# Patient Record
Sex: MALE | Race: WHITE | NOT HISPANIC OR LATINO | Employment: OTHER | URBAN - METROPOLITAN AREA
[De-identification: names, ages, dates, MRNs, and addresses within clinical notes are randomized per-mention and may not be internally consistent; named-entity substitution may affect disease eponyms.]

---

## 2017-09-13 ENCOUNTER — TRANSFERRED RECORDS (OUTPATIENT)
Dept: HEALTH INFORMATION MANAGEMENT | Facility: CLINIC | Age: 77
End: 2017-09-13

## 2017-10-26 ENCOUNTER — ALLIED HEALTH/NURSE VISIT (OUTPATIENT)
Dept: FAMILY MEDICINE | Facility: CLINIC | Age: 77
End: 2017-10-26
Payer: COMMERCIAL

## 2017-10-26 DIAGNOSIS — Z23 NEED FOR PROPHYLACTIC VACCINATION AND INOCULATION AGAINST INFLUENZA: Primary | ICD-10-CM

## 2017-10-26 PROCEDURE — 99207 ZZC NO CHARGE NURSE ONLY: CPT

## 2017-10-26 PROCEDURE — 90662 IIV NO PRSV INCREASED AG IM: CPT

## 2017-10-26 PROCEDURE — 90471 IMMUNIZATION ADMIN: CPT

## 2017-10-26 NOTE — PROGRESS NOTES

## 2017-10-26 NOTE — MR AVS SNAPSHOT
"              After Visit Summary   10/26/2017    Cecilio Navarro    MRN: [id number]           Patient Information     Date Of Birth          1940        Visit Information        Provider Department      10/26/2017 1:15 PM [place] PI CMA/LPN [facility]        Today's Diagnoses     Need for prophylactic vaccination and inoculation against influenza    -  1       Follow-ups after your visit        Your next 10 appointments already scheduled     Oct 26, 2017  1:15 PM CDT   Nurse Only with [place] PI CMA/LPN   [facility] ([facility]    [address] [facility] [id number] [phone]              Who to contact     If you have questions or need follow up information about today's clinic visit or your schedule please contact [facility] directly at [phone].  Normal or non-critical lab and imaging results will be communicated to you by MyChart, letter or phone within 4 business days after the clinic has received the results. If you do not hear from us within 7 days, please contact the clinic through MyChart or phone. If you have a critical or abnormal lab result, we will notify you by phone as soon as possible.  Submit refill requests through [employer] or call your pharmacy and they will forward the refill request to us. Please allow 3 business days for your refill to be completed.          Additional Information About Your Visit        Aginov[employer] Information     [employer] lets you send messages to your doctor, view your test results, renew your prescriptions, schedule appointments and more. To sign up, go to [email or web address] . Click on \"Log in\" on the left side of the screen, which will take you to the Welcome page. Then click on \"Sign up Now\" on the right side of the page.     You will be asked to enter the access code listed below, as well as some personal information. Please follow the directions to create your username and password.   "   Your access code is: [email or web address]  Expires: 2018  1:11 PM     Your access code will  in 90 days. If you need help or a new code, please call your [place] clinic or [phone].        Care EveryWhere ID     This is your Care EveryWhere ID. This could be used by other organizations to access your [place] medical records  [id number]         Blood Pressure from Last 3 Encounters:   12/15/16 130/86   16 139/79   10/05/16 120/72    Weight from Last 3 Encounters:   12/15/16 220 lb (99.8 kg)   16 222 lb (100.7 kg)   10/05/16 222 lb (100.7 kg)              We Performed the Following     FLU VACCINE, INCREASED ANTIGEN, PRESV FREE, AGE 65+ [27118]     Vaccine Administration, Initial [67578]        Primary Care Provider Office Phone # Fax #    Damion Morales [phone] [address] [place] [phone]        Equal Access to Services     [facility]: Hadii aad ku hadasho Soomaali, waaxda luqadaha, qaybta kaalmada adeegyada, waxay bruce dillard . So [facility] [phone].    ATENCIÓN: Si habla español, tiene a wynn disposición servicios gratuitos de asistencia lingüística. Llame al [phone].    We comply with applicable federal civil rights laws and [place] laws. We do not discriminate on the basis of race, color, national origin, age, disability, sex, sexual orientation, or gender identity.            Thank you!     Thank you for choosing [facility]  for your care. Our goal is always to provide you with excellent care. Hearing back from our patients is one way we can continue to improve our services. Please take a few minutes to complete the written survey that you may receive in the mail after your visit with us. Thank you!             Your Updated Medication List - Protect others around you: Learn how to safely use, store and throw away your medicines at www.disposemymeds.org.          This list is accurate as of: 10/26/17   1:11 PM.  Always use your most recent med list.                   Brand Name Dispense Instructions for use Diagnosis    ADVIL 200 MG capsule   Generic drug:  ibuprofen      Take 400 mg by mouth 2 times daily as needed.        aspirin 81 MG tablet      Take 81 mg by mouth daily        atorvastatin 40 MG tablet    LIPITOR    90 tablet    Take 1 tablet (40 mg) by mouth daily    Hyperlipidemia LDL goal <100       glipiZIDE 10 MG 24 hr tablet    glipiZIDE XL    90 tablet    Take 1 tablet (10 mg) by mouth daily        lisinopril-hydrochlorothiazide 20-12.5 MG per tablet    PRINZIDE/ZESTORETIC    180 tablet    One tablet twice daily    HTN, goal below 140/90       metFORMIN 1000 MG tablet    GLUCOPHAGE    180 tablet    Take 1 tablet (1,000 mg) by mouth 2 times daily (with meals)        sildenafil 100 MG tablet    VIAGRA    3 tablet    Take 1 tablet (100 mg) by mouth daily as needed for erectile dysfunction    Impotence of organic origin       vardenafil 20 MG tablet    LEVITRA    5 tablet    Take 1 tablet (20 mg) by mouth daily as needed for erectile dysfunction    Impotence of organic origin

## 2017-12-06 DIAGNOSIS — E11.21 TYPE 2 DIABETES MELLITUS WITH DIABETIC NEPHROPATHY, WITHOUT LONG-TERM CURRENT USE OF INSULIN (H): Primary | ICD-10-CM

## 2017-12-06 DIAGNOSIS — I10 HTN, GOAL BELOW 140/90: ICD-10-CM

## 2017-12-06 RX ORDER — GLIPIZIDE 10 MG/1
TABLET, FILM COATED, EXTENDED RELEASE ORAL
Qty: 90 TABLET | Refills: 0 | Status: SHIPPED | OUTPATIENT
Start: 2017-12-06 | End: 2017-12-18

## 2017-12-06 RX ORDER — LISINOPRIL AND HYDROCHLOROTHIAZIDE 12.5; 2 MG/1; MG/1
TABLET ORAL
Qty: 180 TABLET | Refills: 0 | Status: SHIPPED | OUTPATIENT
Start: 2017-12-06 | End: 2017-12-18

## 2017-12-18 ENCOUNTER — OFFICE VISIT (OUTPATIENT)
Dept: FAMILY MEDICINE | Facility: CLINIC | Age: 77
End: 2017-12-18
Payer: COMMERCIAL

## 2017-12-18 VITALS
TEMPERATURE: 96.9 F | WEIGHT: 224 LBS | HEIGHT: 67 IN | RESPIRATION RATE: 16 BRPM | HEART RATE: 68 BPM | DIASTOLIC BLOOD PRESSURE: 78 MMHG | SYSTOLIC BLOOD PRESSURE: 130 MMHG | BODY MASS INDEX: 35.16 KG/M2

## 2017-12-18 DIAGNOSIS — E11.21 TYPE 2 DIABETES MELLITUS WITH DIABETIC NEPHROPATHY, WITHOUT LONG-TERM CURRENT USE OF INSULIN (H): Primary | ICD-10-CM

## 2017-12-18 DIAGNOSIS — E66.01 MORBID OBESITY (H): ICD-10-CM

## 2017-12-18 DIAGNOSIS — E78.5 HYPERLIPIDEMIA LDL GOAL <100: ICD-10-CM

## 2017-12-18 DIAGNOSIS — E34.9 TESTOSTERONE DEFICIENCY: ICD-10-CM

## 2017-12-18 DIAGNOSIS — I10 HTN, GOAL BELOW 140/90: ICD-10-CM

## 2017-12-18 LAB
ANION GAP SERPL CALCULATED.3IONS-SCNC: 6 MMOL/L (ref 3–14)
BUN SERPL-MCNC: 16 MG/DL (ref 7–30)
CALCIUM SERPL-MCNC: 9.4 MG/DL (ref 8.5–10.1)
CHLORIDE SERPL-SCNC: 99 MMOL/L (ref 94–109)
CO2 SERPL-SCNC: 31 MMOL/L (ref 20–32)
CREAT SERPL-MCNC: 0.97 MG/DL (ref 0.66–1.25)
CREAT UR-MCNC: 190 MG/DL
GFR SERPL CREATININE-BSD FRML MDRD: 75 ML/MIN/1.7M2
GLUCOSE SERPL-MCNC: 187 MG/DL (ref 70–99)
HBA1C MFR BLD: 9 % (ref 4.3–6)
MICROALBUMIN UR-MCNC: 25 MG/L
MICROALBUMIN/CREAT UR: 13.32 MG/G CR (ref 0–17)
POTASSIUM SERPL-SCNC: 4.4 MMOL/L (ref 3.4–5.3)
SODIUM SERPL-SCNC: 136 MMOL/L (ref 133–144)

## 2017-12-18 PROCEDURE — 99214 OFFICE O/P EST MOD 30 MIN: CPT | Performed by: FAMILY MEDICINE

## 2017-12-18 PROCEDURE — 83036 HEMOGLOBIN GLYCOSYLATED A1C: CPT | Performed by: FAMILY MEDICINE

## 2017-12-18 PROCEDURE — 82043 UR ALBUMIN QUANTITATIVE: CPT | Performed by: FAMILY MEDICINE

## 2017-12-18 PROCEDURE — 80048 BASIC METABOLIC PNL TOTAL CA: CPT | Performed by: FAMILY MEDICINE

## 2017-12-18 PROCEDURE — 36415 COLL VENOUS BLD VENIPUNCTURE: CPT | Performed by: FAMILY MEDICINE

## 2017-12-18 PROCEDURE — 84403 ASSAY OF TOTAL TESTOSTERONE: CPT | Performed by: FAMILY MEDICINE

## 2017-12-18 RX ORDER — LISINOPRIL AND HYDROCHLOROTHIAZIDE 12.5; 2 MG/1; MG/1
1 TABLET ORAL 2 TIMES DAILY
Qty: 180 TABLET | Refills: 3 | Status: SHIPPED | OUTPATIENT
Start: 2017-12-18 | End: 2018-12-20

## 2017-12-18 RX ORDER — ATORVASTATIN CALCIUM 40 MG/1
40 TABLET, FILM COATED ORAL DAILY
Qty: 90 TABLET | Refills: 3 | Status: SHIPPED | OUTPATIENT
Start: 2017-12-18 | End: 2018-12-20

## 2017-12-18 RX ORDER — GLIPIZIDE 10 MG/1
10 TABLET, FILM COATED, EXTENDED RELEASE ORAL DAILY
Qty: 90 TABLET | Refills: 3 | Status: SHIPPED | OUTPATIENT
Start: 2017-12-18 | End: 2018-12-20

## 2017-12-18 NOTE — LETTER
December 22, 2017      Cecilio Navarro  [address] [place] [id number]        Dear ,    We are writing to inform you of your test results.    Here are those labs I told you about.      We'll see you back in the spring and recheck that A1C, see how you're doing on the  Diabetes.    Resulted Orders   Testosterone, total   Result Value Ref Range    Testosterone Total 228 (L) 240 - 950 ng/dL      Comment:      This test was developed and its performance characteristics determined by the   [facility],  Special Chemistry Laboratory. It has   not been cleared or approved by the FDA. The laboratory is regulated under   CLIA as qualified to perform high-complexity testing. This test is used for   clinical purposes. It should not be regarded as investigational or for   research.     Hemoglobin A1c   Result Value Ref Range    Hemoglobin A1C 9.0 (H) 4.3 - 6.0 %   Basic metabolic panel  (Ca, Cl, CO2, Creat, Gluc, K, Na, BUN)   Result Value Ref Range    Sodium 136 133 - 144 mmol/L    Potassium 4.4 3.4 - 5.3 mmol/L    Chloride 99 94 - 109 mmol/L    Carbon Dioxide 31 20 - 32 mmol/L    Anion Gap 6 3 - 14 mmol/L    Glucose 187 (H) 70 - 99 mg/dL      Comment:      Fasting specimen    Urea Nitrogen 16 7 - 30 mg/dL    Creatinine 0.97 0.66 - 1.25 mg/dL    GFR Estimate 75 >60 mL/min/1.7m2      Comment:      Non  GFR Calc    GFR Estimate If Black >90 >60 mL/min/1.7m2      Comment:       GFR Calc    Calcium 9.4 8.5 - 10.1 mg/dL   Albumin Random Urine Quantitative with Creat Ratio   Result Value Ref Range    Creatinine Urine 190 mg/dL    Albumin Urine mg/L 25 mg/L    Albumin Urine mg/g Cr 13.32 0 - 17 mg/g Cr       If you have any questions or concerns, please call the clinic at the number listed above.       Sincerely,        Damion Morales MD/vernon

## 2017-12-18 NOTE — MR AVS SNAPSHOT
"              After Visit Summary   12/18/2017    Cecilio Navarro    MRN: [id number]           Patient Information     Date Of Birth          1940        Visit Information        Provider Department      12/18/2017 9:00 AM Damion Morales MD [facility]        Today's Diagnoses     Type 2 diabetes mellitus with diabetic nephropathy, without long-term current use of insulin (H)    -  1    HTN, goal below 140/90        Hyperlipidemia LDL goal <100        Morbid obesity (H)        Testosterone deficiency           Follow-ups after your visit        Who to contact     If you have questions or need follow up information about today's clinic visit or your schedule please contact [facility] directly at [phone].  Normal or non-critical lab and imaging results will be communicated to you by MyChart, letter or phone within 4 business days after the clinic has received the results. If you do not hear from us within 7 days, please contact the clinic through [employer] or phone. If you have a critical or abnormal lab result, we will notify you by phone as soon as possible.  Submit refill requests through [employer] or call your pharmacy and they will forward the refill request to us. Please allow 3 business days for your refill to be completed.          Additional Information About Your Visit        MyChart Information     [employer] lets you send messages to your doctor, view your test results, renew your prescriptions, schedule appointments and more. To sign up, go to [email or web address] . Click on \"Log in\" on the left side of the screen, which will take you to the Welcome page. Then click on \"Sign up Now\" on the right side of the page.     You will be asked to enter the access code listed below, as well as some personal information. Please follow the directions to create your username and password.     Your access code is: [email or web address]  Expires: 1/24/2018 12:11 PM     Your access code will " " in 90 days. If you need help or a new code, please call your [facility] clinic or [phone].        Care EveryWhere ID     This is your Care EveryWhere ID. This could be used by other organizations to access your [facility] medical records  [id number]        Your Vitals Were     Pulse Temperature Respirations Height BMI (Body Mass Index)       68 96.9  F (36.1  C) (Tympanic) 16 5' 6.75\" (1.695 m) 35.35 kg/m2        Blood Pressure from Last 3 Encounters:   17 130/78   12/15/16 130/86   16 139/79    Weight from Last 3 Encounters:   17 224 lb (101.6 kg)   12/15/16 220 lb (99.8 kg)   16 222 lb (100.7 kg)              We Performed the Following     Albumin Random Urine Quantitative with Creat Ratio     Basic metabolic panel  (Ca, Cl, CO2, Creat, Gluc, K, Na, BUN)     Hemoglobin A1c     Testosterone, total          Today's Medication Changes          These changes are accurate as of: 17 10:05 AM.  If you have any questions, ask your nurse or doctor.               These medicines have changed or have updated prescriptions.        Dose/Directions    glipiZIDE 10 MG 24 hr tablet   Commonly known as:  GLUCOTROL XL   This may have changed:  See the new instructions.   Used for:  Type 2 diabetes mellitus with diabetic nephropathy, without long-term current use of insulin (H)   Changed by:  Damion Morales MD        Dose:  10 mg   Take 1 tablet (10 mg) by mouth daily   Quantity:  90 tablet   Refills:  3       lisinopril-hydrochlorothiazide 20-12.5 MG per tablet   Commonly known as:  PRINZIDE/ZESTORETIC   This may have changed:  See the new instructions.   Used for:  HTN, goal below 140/90   Changed by:  Damion Morales MD        Dose:  1 tablet   Take 1 tablet by mouth 2 times daily   Quantity:  180 tablet   Refills:  3            Where to get your medicines      [facility] [place], [place] - [address]   [address] , [facility] [address]     " Phone:  [phone]     atorvastatin 40 MG tablet    glipiZIDE 10 MG 24 hr tablet    lisinopril-hydrochlorothiazide 20-12.5 MG per tablet    metFORMIN 1000 MG tablet                Primary Care Provider Office Phone # Fax #    Damion Morales [phone] [address] [facility] [address]        Equal Access to Services     [facility]: Hadii aad ku hadasho Soomaali, waaxda luqadaha, qaybta kaalmada adeegyada, waxay idiin hayaan adeeg kharash la'aan ah. So [facility] [phone].    ATENCIÓN: Si habla español, tiene a wynn disposición servicios gratuitos de asistencia lingüística. Rebekah al [phone].    We comply with applicable federal civil rights laws and [place] laws. We do not discriminate on the basis of race, color, national origin, age, disability, sex, sexual orientation, or gender identity.            Thank you!     Thank you for choosing [facility]  for your care. Our goal is always to provide you with excellent care. Hearing back from our patients is one way we can continue to improve our services. Please take a few minutes to complete the written survey that you may receive in the mail after your visit with us. Thank you!             Your Updated Medication List - Protect others around you: Learn how to safely use, store and throw away your medicines at www.disposemymeds.org.          This list is accurate as of: 12/18/17 10:05 AM.  Always use your most recent med list.                   Brand Name Dispense Instructions for use Diagnosis    ADVIL 200 MG capsule   Generic drug:  ibuprofen      Take 400 mg by mouth 2 times daily as needed.        aspirin 81 MG tablet      Take 81 mg by mouth daily        atorvastatin 40 MG tablet    LIPITOR    90 tablet    Take 1 tablet (40 mg) by mouth daily    Hyperlipidemia LDL goal <100       glipiZIDE 10 MG 24 hr tablet    GLUCOTROL XL    90 tablet    Take 1 tablet (10 mg) by mouth daily    Type 2 diabetes mellitus with  diabetic nephropathy, without long-term current use of insulin (H)       lisinopril-hydrochlorothiazide 20-12.5 MG per tablet    PRINZIDE/ZESTORETIC    180 tablet    Take 1 tablet by mouth 2 times daily    HTN, goal below 140/90       metFORMIN 1000 MG tablet    GLUCOPHAGE    180 tablet    Take 1 tablet (1,000 mg) by mouth 2 times daily (with meals)    Type 2 diabetes mellitus with diabetic nephropathy, without long-term current use of insulin (H)       sildenafil 100 MG tablet    VIAGRA    3 tablet    Take 1 tablet (100 mg) by mouth daily as needed for erectile dysfunction    Impotence of organic origin       vardenafil 20 MG tablet    LEVITRA    5 tablet    Take 1 tablet (20 mg) by mouth daily as needed for erectile dysfunction    Impotence of organic origin

## 2017-12-18 NOTE — PROGRESS NOTES
"  SUBJECTIVE:   Cecilio Navarro is a 77 year old male who presents to clinic today for the following health issues:       Diabetes Follow-up      Patient is checking blood sugars: not at all    Diabetic concerns: None     Symptoms of hypoglycemia (low blood sugar): none     Paresthesias (numbness or burning in feet) or sores: No     Date of last diabetic eye exam: Up to date    BP Readings from Last 2 Encounters:   12/18/17 130/78   12/15/16 130/86     Hemoglobin A1C (%)   Date Value   12/15/2016 6.3 (H)   01/20/2016 6.8 (H)     LDL Cholesterol Calculated (mg/dL)   Date Value   01/20/2016 39   12/16/2013 82     LDL Cholesterol Direct (mg/dL)   Date Value   12/17/2014 81     Hypertension Follow-up      Outpatient blood pressures are not being checked.    Low Salt Diet: no added salt    Lab Request - would like PSA, testosterone levels checked.        Amount of exercise or physical activity: None    Problems taking medications regularly: No    Medication side effects: none    Diet: regular (no restrictions)        S: Cecilio Navarro is a 77 year old male with DM2 ,med/diet controlle,d some mild proteinuria in past.  Recheck today, fills on meds.   Htn: stable on meds, fills and labs  Hyperlipidemia: stable, statin. Refills  Testosterone deficiency: no libido.  Has been on replacement in past, didn't think it did much.  Maybe wants to retry, depends on where his labs are, wants to recheck  Morbid obesity: DM2 as comorbidity    Problem list, med list, additional histories reviewed and updated, as indicated.      No cp or sob    O;/78  Pulse 68  Temp 96.9  F (36.1  C) (Tympanic)  Resp 16  Ht 5' 6.75\" (1.695 m)  Wt 224 lb (101.6 kg)  BMI 35.35 kg/m2  GEN: Alert and oriented, in no acute distress  CV: RRR, no murmur  RESP: lungs clear bilaterally, good effort  EXT: no edema or lesions noted in lower extremities    A; DM2 with nephropathy, recheck       Htn, stable       Hyperlipidemia, stable       " Testosterone  Deficiency, recheck      Morbid obesity, ongoing     P: labs, fills.  Will see what he things about testosterone level and replacement again.     Weight management plan: diet/exercise       .

## 2017-12-18 NOTE — NURSING NOTE
"Chief Complaint   Patient presents with     Diabetes     Hypertension       Initial /78  Pulse 68  Temp 96.9  F (36.1  C) (Tympanic)  Resp 16  Ht 5' 6.75\" (1.695 m)  Wt 224 lb (101.6 kg)  BMI 35.35 kg/m2 Estimated body mass index is 35.35 kg/(m^2) as calculated from the following:    Height as of this encounter: 5' 6.75\" (1.695 m).    Weight as of this encounter: 224 lb (101.6 kg).  Medication Reconciliation: complete    Health Maintenance that is potentially due pending provider review:  Lab work     Possibly completing today per provider review.    Is there anyone who you would like to be able to receive your results? No  If yes have patient fill out LEVAR    "

## 2017-12-20 LAB — TESTOST SERPL-MCNC: 228 NG/DL (ref 240–950)

## 2018-04-23 ENCOUNTER — TELEPHONE (OUTPATIENT)
Dept: FAMILY MEDICINE | Facility: CLINIC | Age: 78
End: 2018-04-23

## 2018-04-23 DIAGNOSIS — E11.21 TYPE 2 DIABETES MELLITUS WITH DIABETIC NEPHROPATHY, WITHOUT LONG-TERM CURRENT USE OF INSULIN (H): Primary | ICD-10-CM

## 2018-04-23 DIAGNOSIS — E78.5 HYPERLIPIDEMIA LDL GOAL <100: ICD-10-CM

## 2018-04-23 DIAGNOSIS — I10 HTN, GOAL BELOW 140/90: ICD-10-CM

## 2018-04-23 NOTE — TELEPHONE ENCOUNTER
Reason for Call:  Other Orders    Detailed comments: Pt has appt Weds and would like to have labs done tomorrow. They will need to be ordered and he would like to make an appt to have them done in [place]. Please call pt when labs have been ordered.    Phone Number Patient can be reached at: Home number on file [phone] (home)    Best Time: any    Can we leave a detailed message on this number? YES    Call taken on 4/23/2018 at 10:17 AM by Katie De Jesus

## 2018-04-24 DIAGNOSIS — E11.21 TYPE 2 DIABETES MELLITUS WITH DIABETIC NEPHROPATHY, WITHOUT LONG-TERM CURRENT USE OF INSULIN (H): ICD-10-CM

## 2018-04-24 DIAGNOSIS — I10 HTN, GOAL BELOW 140/90: ICD-10-CM

## 2018-04-24 DIAGNOSIS — E78.5 HYPERLIPIDEMIA LDL GOAL <100: ICD-10-CM

## 2018-04-24 LAB
ANION GAP SERPL CALCULATED.3IONS-SCNC: 5 MMOL/L (ref 3–14)
BUN SERPL-MCNC: 14 MG/DL (ref 7–30)
CALCIUM SERPL-MCNC: 8.9 MG/DL (ref 8.5–10.1)
CHLORIDE SERPL-SCNC: 101 MMOL/L (ref 94–109)
CHOLEST SERPL-MCNC: 116 MG/DL
CO2 SERPL-SCNC: 30 MMOL/L (ref 20–32)
CREAT SERPL-MCNC: 0.94 MG/DL (ref 0.66–1.25)
GFR SERPL CREATININE-BSD FRML MDRD: 78 ML/MIN/1.7M2
GLUCOSE SERPL-MCNC: 166 MG/DL (ref 70–99)
HBA1C MFR BLD: 8.3 % (ref 0–5.6)
HDLC SERPL-MCNC: 39 MG/DL
LDLC SERPL CALC-MCNC: 50 MG/DL
NONHDLC SERPL-MCNC: 77 MG/DL
POTASSIUM SERPL-SCNC: 3.5 MMOL/L (ref 3.4–5.3)
SODIUM SERPL-SCNC: 136 MMOL/L (ref 133–144)
TRIGL SERPL-MCNC: 133 MG/DL
TSH SERPL DL<=0.005 MIU/L-ACNC: 1.62 MU/L (ref 0.4–4)

## 2018-04-24 PROCEDURE — 36415 COLL VENOUS BLD VENIPUNCTURE: CPT | Performed by: FAMILY MEDICINE

## 2018-04-24 PROCEDURE — 84443 ASSAY THYROID STIM HORMONE: CPT | Performed by: FAMILY MEDICINE

## 2018-04-24 PROCEDURE — 80048 BASIC METABOLIC PNL TOTAL CA: CPT | Performed by: FAMILY MEDICINE

## 2018-04-24 PROCEDURE — 80061 LIPID PANEL: CPT | Performed by: FAMILY MEDICINE

## 2018-04-24 PROCEDURE — 83036 HEMOGLOBIN GLYCOSYLATED A1C: CPT | Performed by: FAMILY MEDICINE

## 2018-04-25 ENCOUNTER — OFFICE VISIT (OUTPATIENT)
Dept: FAMILY MEDICINE | Facility: CLINIC | Age: 78
End: 2018-04-25
Payer: COMMERCIAL

## 2018-04-25 VITALS
BODY MASS INDEX: 35.79 KG/M2 | WEIGHT: 228 LBS | DIASTOLIC BLOOD PRESSURE: 86 MMHG | RESPIRATION RATE: 16 BRPM | HEART RATE: 68 BPM | SYSTOLIC BLOOD PRESSURE: 130 MMHG | HEIGHT: 67 IN | TEMPERATURE: 98.7 F

## 2018-04-25 DIAGNOSIS — E66.01 MORBID OBESITY (H): ICD-10-CM

## 2018-04-25 DIAGNOSIS — E11.21 TYPE 2 DIABETES MELLITUS WITH DIABETIC NEPHROPATHY, WITHOUT LONG-TERM CURRENT USE OF INSULIN (H): Primary | ICD-10-CM

## 2018-04-25 PROCEDURE — 99214 OFFICE O/P EST MOD 30 MIN: CPT | Performed by: FAMILY MEDICINE

## 2018-04-25 NOTE — MR AVS SNAPSHOT
"              After Visit Summary   2018    Cecilio Navarro    MRN: [id number]           Patient Information     Date Of Birth          1940        Visit Information        Provider Department      2018 1:40 PM Damion Morales MD [facility]        Today's Diagnoses     Type 2 diabetes mellitus with diabetic nephropathy, without long-term current use of insulin (H)    -  1    Morbid obesity (H)           Follow-ups after your visit        Who to contact     If you have questions or need follow up information about today's clinic visit or your schedule please contact [facility] directly at [phone].  Normal or non-critical lab and imaging results will be communicated to you by "[employer]hart, letter or phone within 4 business days after the clinic has received the results. If you do not hear from us within 7 days, please contact the clinic through "[employer]hart or phone. If you have a critical or abnormal lab result, we will notify you by phone as soon as possible.  Submit refill requests through [employer] or call your pharmacy and they will forward the refill request to us. Please allow 3 business days for your refill to be completed.          Additional Information About Your Visit        MyChart Information     [employer] lets you send messages to your doctor, view your test results, renew your prescriptions, schedule appointments and more. To sign up, go to [email or web address] . Click on \"Log in\" on the left side of the screen, which will take you to the Welcome page. Then click on \"Sign up Now\" on the right side of the page.     You will be asked to enter the access code listed below, as well as some personal information. Please follow the directions to create your username and password.     Your access code is: [email or web address]  Expires: 2018  2:36 PM     Your access code will  in 90 days. If you need help or a new code, please call your [facility] or " "[phone].        Care EveryWhere ID     This is your Care EveryWhere ID. This could be used by other organizations to access your [place] medical records  [id number]        Your Vitals Were     Pulse Temperature Respirations Height BMI (Body Mass Index)       68 98.7  F (37.1  C) (Tympanic) 16 5' 6.75\" (1.695 m) 35.98 kg/m2        Blood Pressure from Last 3 Encounters:   04/25/18 130/86   12/18/17 130/78   12/15/16 130/86    Weight from Last 3 Encounters:   04/25/18 228 lb (103.4 kg)   12/18/17 224 lb (101.6 kg)   12/15/16 220 lb (99.8 kg)              Today, you had the following     No orders found for display       Primary Care Provider Office Phone # Fax #    Damion Morales [phone]       [address] [place] [address]        Equal Access to Services     [facility] : Hadii aad ku hadasho Soomaali, waaxda luqadaha, qaybta kaalmada adeegyada, waxay idiin hayjennifern naya cumminsarasierra lasuri . So [facility] [phone].    ATENCIÓN: Si habla español, tiene a wynn disposición servicios gratuitos de asistencia lingüística. Llame al [phone].    We comply with applicable federal civil rights laws and [place] laws. We do not discriminate on the basis of race, color, national origin, age, disability, sex, sexual orientation, or gender identity.            Thank you!     Thank you for choosing [facility]  for your care. Our goal is always to provide you with excellent care. Hearing back from our patients is one way we can continue to improve our services. Please take a few minutes to complete the written survey that you may receive in the mail after your visit with us. Thank you!             Your Updated Medication List - Protect others around you: Learn how to safely use, store and throw away your medicines at www.disposemymeds.org.          This list is accurate as of 4/25/18  2:36 PM.  Always use your most recent med list.                   Brand Name Dispense " Instructions for use Diagnosis    ADVIL 200 MG capsule   Generic drug:  ibuprofen      Take 400 mg by mouth 2 times daily as needed.        aspirin 81 MG tablet      Take 81 mg by mouth daily        atorvastatin 40 MG tablet    LIPITOR    90 tablet    Take 1 tablet (40 mg) by mouth daily    Hyperlipidemia LDL goal <100       glipiZIDE 10 MG 24 hr tablet    GLUCOTROL XL    90 tablet    Take 1 tablet (10 mg) by mouth daily    Type 2 diabetes mellitus with diabetic nephropathy, without long-term current use of insulin (H)       lisinopril-hydrochlorothiazide 20-12.5 MG per tablet    PRINZIDE/ZESTORETIC    180 tablet    Take 1 tablet by mouth 2 times daily    HTN, goal below 140/90       metFORMIN 1000 MG tablet    GLUCOPHAGE    180 tablet    Take 1 tablet (1,000 mg) by mouth 2 times daily (with meals)    Type 2 diabetes mellitus with diabetic nephropathy, without long-term current use of insulin (H)       sildenafil 100 MG tablet    VIAGRA    3 tablet    Take 1 tablet (100 mg) by mouth daily as needed for erectile dysfunction    Impotence of organic origin       vardenafil 20 MG tablet    LEVITRA    5 tablet    Take 1 tablet (20 mg) by mouth daily as needed for erectile dysfunction    Impotence of organic origin

## 2018-04-25 NOTE — NURSING NOTE
"Chief Complaint   Patient presents with     Diabetes       Initial /86  Pulse 68  Temp 98.7  F (37.1  C) (Tympanic)  Resp 16  Ht 5' 6.75\" (1.695 m)  Wt 228 lb (103.4 kg)  BMI 35.98 kg/m2 Estimated body mass index is 35.98 kg/(m^2) as calculated from the following:    Height as of this encounter: 5' 6.75\" (1.695 m).    Weight as of this encounter: 228 lb (103.4 kg).      Health Maintenance that is potentially due pending provider review:  NONE    n/a    Is there anyone who you would like to be able to receive your results? No  If yes have patient fill out LEVAR    "

## 2018-04-25 NOTE — PROGRESS NOTES
"  SUBJECTIVE:   Cecilio Navarro is a 77 year old male who presents to clinic today for the following health issues:       Diabetes Follow-up      Patient is checking blood sugars: not at all    Diabetic concerns: None     Symptoms of hypoglycemia (low blood sugar): none     Paresthesias (numbness or burning in feet) or sores: Yes mild tingling      Date of last diabetic eye exam: Up to date    BP Readings from Last 2 Encounters:   12/18/17 130/78   12/15/16 130/86     Hemoglobin A1C (%)   Date Value   04/24/2018 8.3 (H)   12/18/2017 9.0 (H)     LDL Cholesterol Calculated (mg/dL)   Date Value   04/24/2018 50   01/20/2016 39       Amount of exercise or physical activity: None    Problems taking medications regularly: No    Medication side effects: none    Diet: regular (no restrictions)        S: Cecilio Navarro is a 77 year old male with dm2.  Coming down, still not at goal.  2 meds.  Not willing yet to do insulin, knows he's close.      Morbid obesity: contributing, obviously.  He knows it's time to lose weight.     Long talk below.    Problem list, med list, additional histories reviewed and updated, as indicated.      O:/86  Pulse 68  Temp 98.7  F (37.1  C) (Tympanic)  Resp 16  Ht 5' 6.75\" (1.695 m)  Wt 228 lb (103.4 kg)  BMI 35.98 kg/m2  GEN: Alert and oriented, in no acute distress    A: DM2, not at goal     Morbid obesity, not controlled     P: long talk.  He knows he's close to insulin.  Not ready.  Willing to consider if next check not at less than 8,    He's done it before.  \"Doc, I guarantee I'll have it down next check.\"   He told me I could write that down    We shall see.     F/u in 5 or so months.     tt 25 min, more than 1/2 that time spent counseling on diet, exercise, wt loss, diabetes, insulin.    "

## 2018-10-09 ENCOUNTER — ALLIED HEALTH/NURSE VISIT (OUTPATIENT)
Dept: FAMILY MEDICINE | Facility: CLINIC | Age: 78
End: 2018-10-09
Payer: COMMERCIAL

## 2018-10-09 DIAGNOSIS — Z23 NEED FOR PROPHYLACTIC VACCINATION AND INOCULATION AGAINST INFLUENZA: Primary | ICD-10-CM

## 2018-10-09 PROCEDURE — G0008 ADMIN INFLUENZA VIRUS VAC: HCPCS

## 2018-10-09 PROCEDURE — 90662 IIV NO PRSV INCREASED AG IM: CPT

## 2018-10-09 NOTE — MR AVS SNAPSHOT
After Visit Summary   10/9/2018    Cecilio Navarro    MRN: [id number]           Patient Information     Date Of Birth          1940        Visit Information        Provider Department      10/9/2018 9:30 AM FL PI RAMBO/LPN [facility]        Today's Diagnoses     Need for prophylactic vaccination and inoculation against influenza    -  1       Follow-ups after your visit        Who to contact     If you have questions or need follow up information about today's clinic visit or your schedule please contact [facility] directly at [phone].  Normal or non-critical lab and imaging results will be communicated to you by MyChart, letter or phone within 4 business days after the clinic has received the results. If you do not hear from us within 7 days, please contact the clinic through MyChart or phone. If you have a critical or abnormal lab result, we will notify you by phone as soon as possible.  Submit refill requests through [employer] or call your pharmacy and they will forward the refill request to us. Please allow 3 business days for your refill to be completed.          Additional Information About Your Visit        Care EveryWhere ID     This is your Care EveryWhere ID. This could be used by other organizations to access your [place] medical records  [id number]         Blood Pressure from Last 3 Encounters:   04/25/18 130/86   12/18/17 130/78   12/15/16 130/86    Weight from Last 3 Encounters:   04/25/18 228 lb (103.4 kg)   12/18/17 224 lb (101.6 kg)   12/15/16 220 lb (99.8 kg)              We Performed the Following     ADMIN INFLUENZA (For MEDICARE Patients ONLY) []     FLU VACCINE, INCREASED ANTIGEN, PRESV FREE, AGE 65+ [23485]        Primary Care Provider Office Phone # Fax #    Damion Morales [phone]       [address] [place] [phone]        Equal Access to Services     DARCIE SCHAFER AH: Cayden Lopez,  wakevinda steffanyadaha, qaybta kaalmada linnea, isma villegasaalesia ah. So [facility] [phone].    ATENCIÓN: Si malcolm anderson, tiene a wynn disposición servicios gratuitos de asistencia lingüística. Rebekah al [phone].    We comply with applicable federal civil rights laws and [place] laws. We do not discriminate on the basis of race, color, national origin, age, disability, sex, sexual orientation, or gender identity.            Thank you!     Thank you for choosing [facility]  for your care. Our goal is always to provide you with excellent care. Hearing back from our patients is one way we can continue to improve our services. Please take a few minutes to complete the written survey that you may receive in the mail after your visit with us. Thank you!             Your Updated Medication List - Protect others around you: Learn how to safely use, store and throw away your medicines at www.disposemymeds.org.          This list is accurate as of 10/9/18  9:36 AM.  Always use your most recent med list.                   Brand Name Dispense Instructions for use Diagnosis    ADVIL 200 MG capsule   Generic drug:  ibuprofen      Take 400 mg by mouth 2 times daily as needed.        aspirin 81 MG tablet      Take 81 mg by mouth daily        atorvastatin 40 MG tablet    LIPITOR    90 tablet    Take 1 tablet (40 mg) by mouth daily    Hyperlipidemia LDL goal <100       glipiZIDE 10 MG 24 hr tablet    GLUCOTROL XL    90 tablet    Take 1 tablet (10 mg) by mouth daily    Type 2 diabetes mellitus with diabetic nephropathy, without long-term current use of insulin (H)       lisinopril-hydrochlorothiazide 20-12.5 MG per tablet    PRINZIDE/ZESTORETIC    180 tablet    Take 1 tablet by mouth 2 times daily    HTN, goal below 140/90       metFORMIN 1000 MG tablet    GLUCOPHAGE    180 tablet    Take 1 tablet (1,000 mg) by mouth 2 times daily (with meals)    Type 2 diabetes mellitus with diabetic nephropathy,  without long-term current use of insulin (H)       sildenafil 100 MG tablet    VIAGRA    3 tablet    Take 1 tablet (100 mg) by mouth daily as needed for erectile dysfunction    Impotence of organic origin       vardenafil 20 MG tablet    LEVITRA    5 tablet    Take 1 tablet (20 mg) by mouth daily as needed for erectile dysfunction    Impotence of organic origin

## 2018-10-09 NOTE — PROGRESS NOTES

## 2018-12-20 ENCOUNTER — OFFICE VISIT (OUTPATIENT)
Dept: FAMILY MEDICINE | Facility: CLINIC | Age: 78
End: 2018-12-20
Payer: COMMERCIAL

## 2018-12-20 VITALS
BODY MASS INDEX: 34.37 KG/M2 | SYSTOLIC BLOOD PRESSURE: 130 MMHG | HEIGHT: 67 IN | RESPIRATION RATE: 20 BRPM | HEART RATE: 75 BPM | DIASTOLIC BLOOD PRESSURE: 88 MMHG | WEIGHT: 219 LBS | OXYGEN SATURATION: 97 % | TEMPERATURE: 97.3 F

## 2018-12-20 DIAGNOSIS — E78.5 HYPERLIPIDEMIA LDL GOAL <100: ICD-10-CM

## 2018-12-20 DIAGNOSIS — Z00.00 MEDICARE ANNUAL WELLNESS VISIT, SUBSEQUENT: Primary | ICD-10-CM

## 2018-12-20 DIAGNOSIS — E11.21 TYPE 2 DIABETES MELLITUS WITH DIABETIC NEPHROPATHY, WITHOUT LONG-TERM CURRENT USE OF INSULIN (H): ICD-10-CM

## 2018-12-20 DIAGNOSIS — I10 HTN, GOAL BELOW 140/90: ICD-10-CM

## 2018-12-20 LAB
CREAT UR-MCNC: 87 MG/DL
HBA1C MFR BLD: 8.6 % (ref 0–5.6)
MICROALBUMIN UR-MCNC: 16 MG/L
MICROALBUMIN/CREAT UR: 18.89 MG/G CR (ref 0–17)

## 2018-12-20 PROCEDURE — 99213 OFFICE O/P EST LOW 20 MIN: CPT | Mod: 25 | Performed by: FAMILY MEDICINE

## 2018-12-20 PROCEDURE — 99397 PER PM REEVAL EST PAT 65+ YR: CPT | Performed by: FAMILY MEDICINE

## 2018-12-20 PROCEDURE — 36415 COLL VENOUS BLD VENIPUNCTURE: CPT | Performed by: FAMILY MEDICINE

## 2018-12-20 PROCEDURE — 82043 UR ALBUMIN QUANTITATIVE: CPT | Performed by: FAMILY MEDICINE

## 2018-12-20 PROCEDURE — 83036 HEMOGLOBIN GLYCOSYLATED A1C: CPT | Performed by: FAMILY MEDICINE

## 2018-12-20 RX ORDER — GLIPIZIDE 10 MG/1
10 TABLET, FILM COATED, EXTENDED RELEASE ORAL DAILY
Qty: 90 TABLET | Refills: 3 | Status: SHIPPED | OUTPATIENT
Start: 2018-12-20 | End: 2019-11-21

## 2018-12-20 RX ORDER — ATORVASTATIN CALCIUM 40 MG/1
40 TABLET, FILM COATED ORAL DAILY
Qty: 90 TABLET | Refills: 3 | Status: SHIPPED | OUTPATIENT
Start: 2018-12-20 | End: 2019-11-21

## 2018-12-20 RX ORDER — LISINOPRIL AND HYDROCHLOROTHIAZIDE 12.5; 2 MG/1; MG/1
1 TABLET ORAL 2 TIMES DAILY
Qty: 180 TABLET | Refills: 3 | Status: SHIPPED | OUTPATIENT
Start: 2018-12-20 | End: 2019-07-15

## 2018-12-20 ASSESSMENT — ENCOUNTER SYMPTOMS
MYALGIAS: 0
DYSURIA: 0
COUGH: 0
FEVER: 0
HEMATOCHEZIA: 0
CHILLS: 0
ARTHRALGIAS: 0
DIARRHEA: 0
PALPITATIONS: 0
FREQUENCY: 1
CONSTIPATION: 0
SORE THROAT: 0
SHORTNESS OF BREATH: 0
ABDOMINAL PAIN: 0
EYE PAIN: 0
PARESTHESIAS: 0
WEAKNESS: 0
DIZZINESS: 0
HEADACHES: 0
HEMATURIA: 0
HEARTBURN: 0
JOINT SWELLING: 0
NERVOUS/ANXIOUS: 0
NAUSEA: 0

## 2018-12-20 ASSESSMENT — PATIENT HEALTH QUESTIONNAIRE - PHQ9
10. IF YOU CHECKED OFF ANY PROBLEMS, HOW DIFFICULT HAVE THESE PROBLEMS MADE IT FOR YOU TO DO YOUR WORK, TAKE CARE OF THINGS AT HOME, OR GET ALONG WITH OTHER PEOPLE: NOT DIFFICULT AT ALL
SUM OF ALL RESPONSES TO PHQ QUESTIONS 1-9: 0
SUM OF ALL RESPONSES TO PHQ QUESTIONS 1-9: 0

## 2018-12-20 ASSESSMENT — ACTIVITIES OF DAILY LIVING (ADL): CURRENT_FUNCTION: NO ASSISTANCE NEEDED

## 2018-12-20 ASSESSMENT — MIFFLIN-ST. JEOR: SCORE: 1682.01

## 2018-12-20 NOTE — PATIENT INSTRUCTIONS
Preventive Health Recommendations:     See your health care provider every year to    Review health changes.     Discuss preventive care.      Review your medicines if your doctor has prescribed any.    Talk with your health care provider about whether you should have a test to screen for prostate cancer (PSA).    Every 3 years, have a diabetes test (fasting glucose). If you are at risk for diabetes, you should have this test more often.    Every 5 years, have a cholesterol test. Have this test more often if you are at risk for high cholesterol or heart disease.     Every 10 years, have a colonoscopy. Or, have a yearly FIT test (stool test). These exams will check for colon cancer.    Talk to with your health care provider about screening for Abdominal Aortic Aneurysm if you have a family history of AAA or have a history of smoking.  Shots:     Get a flu shot each year.     Get a tetanus shot every 10 years.     Talk to your doctor about your pneumonia vaccines. There are now two you should receive - Pneumovax (PPSV 23) and Prevnar (PCV 13).    Talk to your pharmacist about a shingles vaccine.     Talk to your doctor about the hepatitis B vaccine.  Nutrition:     Eat at least 5 servings of fruits and vegetables each day.     Eat whole-grain bread, whole-wheat pasta and brown rice instead of white grains and rice.     Get adequate Calcium and Vitamin D.   Lifestyle    Exercise for at least 150 minutes a week (30 minutes a day, 5 days a week). This will help you control your weight and prevent disease.     Limit alcohol to one drink per day.     No smoking.     Wear sunscreen to prevent skin cancer.     See your dentist every six months for an exam and cleaning.     See your eye doctor every 1 to 2 years to screen for conditions such as glaucoma, macular degeneration and cataracts.    Personalized Prevention Plan  You are due for the preventive services outlined below.  Your care team is available to assist you in  scheduling these services.  If you have already completed any of these items, please share that information with your care team to update in your medical record.    Health Maintenance Due   Topic Date Due     Zoster (Chicken Pox) Vaccine (1 of 2) 07/27/1992     Diabetic Foot Exam - yearly  12/16/2014     Eye Exam - yearly  02/28/2015     Diptheria Tetanus Pertussis (DTAP/TDAP/TD) Vaccine (3 - Td) 10/01/2017     A1C (Diabetes) Lab - every 6 months  10/24/2018     Microalbumin Lab - yearly  12/18/2018

## 2018-12-20 NOTE — PROGRESS NOTES
"  SUBJECTIVE:   Cecilio Navarro is a 78 year old male who presents for Preventive Visit.  {PVP to remind patient that this is not necessarily a physical exam; physical exam may or may not be done:167875::\"click delete button to remove this line now\"}  {PVP to inform patient that additional E&M charge may apply, if additional problems addressed:356528::\"click delete button to remove this line now\"}  Are you in the first 12 months of your Medicare Part B coverage?  { :345993::\"No\"}    Physical Health:    In general, how would you rate your overall physical health? { :939776}    Outside of work, how many days during the week do you exercise? { :877119}    Outside of work, approximately how many minutes a day do you exercise?{ :623903}    If you drink alcohol do you typically have >3 drinks per day or >7 drinks per week? { :882666}    Do you usually eat at least 4 servings of fruit and vegetables a day, include whole grains & fiber and avoid regularly eating high fat or \"junk\" foods? { :728333::\"Yes\"}    Do you have any problems taking medications regularly?  { :057737::\"No\"}    Do you have any side effects from medications? { :572739}    Needs assistance for the following daily activities: { :192277}    Which of the following safety concerns are present in your home?  { :000351::\"none identified\"}     Hearing impairment: { :899361}    In the past 6 months, have you been bothered by leaking of urine? { :103625}    Mental Health:    In general, how would you rate your overall mental or emotional health? { :154250}  PHQ-2 Score: (!) (P) 3    Do you feel safe in your environment? { :720626}    Do you have a Health Care Directive? { :954175}    Additional concerns to address?  {If YES, notify patient they may be responsible for a copay, coinsurance or deductible if the visit today includes services such as checking on a sore throat, having an x-ray or lab test, or treating and evaluating a new or existing condition " ":933045::\"No\"}    Fall risk:  { :681197}  {If any of the above assessments are answered yes, consider ordering appropriate referrals (Optional):349265::\"click delete button to remove this line now\"}  Cognitive Screening: { :980146}    {Do you have sleep apnea, excessive snoring or daytime drowsiness? (Optional):807916}    {Outside tests to abstract? :270372}    {additional problems to add (Optional):510889}    Reviewed and updated as needed this visit by clinical staff         Reviewed and updated as needed this visit by Provider        Social History     Tobacco Use     Smoking status: Never Smoker     Smokeless tobacco: Never Used   Substance Use Topics     Alcohol use: Yes     Comment: occasional social                           Current providers sharing in care for this patient include:   Patient Care Team:  Damion Morales MD as PCP - General    The following health maintenance items are reviewed in Epic and correct as of today:  Health Maintenance   Topic Date Due     ZOSTER IMMUNIZATION (1 of 2) 07/27/1992     FOOT EXAM Q1 YEAR  12/16/2014     EYE EXAM Q1 YEAR  02/28/2015     DTAP/TDAP/TD IMMUNIZATION (3 - Td) 10/01/2017     A1C Q6 MO  10/24/2018     MICROALBUMIN Q1 YEAR  12/18/2018     ADVANCE DIRECTIVE PLANNING Q5 YRS  02/28/2019     CREATININE Q1 YEAR  04/24/2019     LIPID MONITORING Q1 YEAR  04/24/2019     FALL RISK ASSESSMENT  04/25/2019     PHQ-2 Q1 YR  04/25/2019     TSH W/ FREE T4 REFLEX Q2 YEAR  04/24/2020     INFLUENZA VACCINE  Completed     PNEUMOVAX IMMUNIZATION 65+ LOW/MEDIUM RISK  Completed     IPV IMMUNIZATION  Aged Out     MENINGITIS IMMUNIZATION  Aged Out     {Chronicprobdata (Optional):451333}  {Decision Support (Optional):438367}    ROS:  {ROS COMP:592520}    OBJECTIVE:   There were no vitals taken for this visit. Estimated body mass index is 35.98 kg/m  as calculated from the following:    Height as of 4/25/18: 1.695 m (5' 6.75\").    Weight as of 4/25/18: 103.4 kg (228 lb).  EXAM:   {Exam " ":941960}    {Diagnostic Test Results (Optional):250585::\"Diagnostic Test Results:\",\"none \"}    ASSESSMENT / PLAN:   {Diag Picklist:530807}    End of Life Planning:  Patient currently has an advanced directive: { :166928}    COUNSELING:  {Medicare Counselin}    BP Readings from Last 1 Encounters:   18 130/86     Estimated body mass index is 35.98 kg/m  as calculated from the following:    Height as of 18: 1.695 m (5' 6.75\").    Weight as of 18: 103.4 kg (228 lb).    {BP Counseling- Complete if BP >= 120/80  (Optional):705968}  {Weight Management Plan (ACO) Complete if BMI is abnormal-  Ages 18-64  BMI >24.9.  Age 65+ with BMI <23 or >30 (Optional):901722}     reports that  has never smoked. he has never used smokeless tobacco.  {Tobacco Cessation -- Complete if patient is a smoker (Optional):690832}    Appropriate preventive services were discussed with this patient, including applicable screening as appropriate for cardiovascular disease, diabetes, osteopenia/osteoporosis, and glaucoma.  As appropriate for age/gender, discussed screening for colorectal cancer, prostate cancer, breast cancer, and cervical cancer. Checklist reviewing preventive services available has been given to the patient.    Reviewed patients plan of care and provided an AVS. The {CarePlan:433382} for Cecilio meets the Care Plan requirement. This Care Plan has been established and reviewed with the {PATIENT, FAMILY MEMBER, CAREGIVER:755105}.    Counseling Resources:  ATP IV Guidelines  Pooled Cohorts Equation Calculator  Breast Cancer Risk Calculator  FRAX Risk Assessment  ICSI Preventive Guidelines  Dietary Guidelines for Americans, 2010  [employer] MyPlate  ASA Prophylaxis  Lung CA Screening    Damion Morales MD  [facility]  "

## 2018-12-20 NOTE — PROGRESS NOTES
"SUBJECTIVE:   Cecilio Navarro is a 78 year old male who presents for Preventive Visit.    Are you in the first 12 months of your Medicare coverage?  No    Annual Wellness Visit     In general, how would you rate your overall health?  Excellent    Frequency of exercise:  6-7 days/week    Duration of exercise:  15-30 minutes    Do you usually eat at least 4 servings of fruit and vegetables a day, include whole grains    & fiber and avoid regularly eating high fat or \"junk\" foods?  Yes    Taking medications regularly:  Yes    Medication side effects:  None    Ability to successfully perform activities of daily living:  No assistance needed    Home Safety:  No safety concerns identified    Hearing Impairment:  Difficulty following a conversation in a noisy restaurant or crowded room, need to ask people to speak up or repeat themselves and difficulty understanding soft or whispered speech    In the past 6 months, have you been bothered by leaking of urine?  No    In general, how would you rate your overall mental or emotional health?  Excellent    PHQ-2 Total Score: 3    Additional concerns today:  No    Do you feel safe in your environment? Yes    Do you have a Health Care Directive? Yes: Patient states has Advance Directive and will bring in a copy to clinic.    Fall risk  Fallen 2 or more times in the past year?: No  Any fall with injury in the past year?: No    Cognitive Screening   1) Repeat 3 items (Leader, Season, Table)    2) Clock draw: NORMAL  3) 3 item recall: Recalls 1 object   Results: NORMAL clock, 1-2 items recalled: COGNITIVE IMPAIRMENT LESS LIKELY    Mini-CogTM Copyright DOMINGA Barga. Licensed by the author for use in [facility]; reprinted with permission (flor@.[facility]). All rights reserved.      Do you have sleep apnea, excessive snoring or daytime drowsiness?: no    Reviewed and updated as needed this visit by clinical staff         Reviewed and updated as needed this visit by Provider      "   Social History     Tobacco Use     Smoking status: Never Smoker     Smokeless tobacco: Never Used   Substance Use Topics     Alcohol use: Yes     Comment: occasional social       Alcohol Use 12/20/2018   If you drink alcohol do you typically have greater than 3 drinks per day OR greater than 7 drinks per week? No   No flowsheet data found.      Here for his diabetic check as  Well    DM2 with nephropathy: recheck.  Has been over 8 on 2 meds.  Has lost some wt, so hoping it's  Better  Htn: stable on meds, fills today  Hyperlipidemia: on statin, fills.    Obesity: has come down out of morbid class.        Current providers sharing in care for this patient include:   Patient Care Team:  Damion Morales MD as PCP - General    The following health maintenance items are reviewed in Epic and correct as of today:  Health Maintenance   Topic Date Due     ZOSTER IMMUNIZATION (1 of 2) 07/27/1992     FOOT EXAM Q1 YEAR  12/16/2014     EYE EXAM Q1 YEAR  02/28/2015     DTAP/TDAP/TD IMMUNIZATION (3 - Td) 10/01/2017     A1C Q6 MO  10/24/2018     MICROALBUMIN Q1 YEAR  12/18/2018     ADVANCE DIRECTIVE PLANNING Q5 YRS  02/28/2019     CREATININE Q1 YEAR  04/24/2019     LIPID MONITORING Q1 YEAR  04/24/2019     FALL RISK ASSESSMENT  04/25/2019     PHQ-2 Q1 YR  04/25/2019     TSH W/ FREE T4 REFLEX Q2 YEAR  04/24/2020     INFLUENZA VACCINE  Completed     PNEUMOVAX IMMUNIZATION 65+ LOW/MEDIUM RISK  Completed     IPV IMMUNIZATION  Aged Out     MENINGITIS IMMUNIZATION  Aged Out       Review of Systems   Constitutional: Negative for chills and fever.   HENT: Positive for hearing loss. Negative for congestion, ear pain and sore throat.    Eyes: Negative for pain and visual disturbance.   Respiratory: Negative for cough and shortness of breath.    Cardiovascular: Negative for chest pain, palpitations and peripheral edema.   Gastrointestinal: Negative for abdominal pain, constipation, diarrhea, heartburn, hematochezia and nausea.  "  Genitourinary: Positive for frequency and impotence. Negative for discharge, dysuria, genital sores, hematuria and urgency.   Musculoskeletal: Negative for arthralgias, joint swelling and myalgias.   Skin: Negative for rash.   Neurological: Negative for dizziness, weakness, headaches and paresthesias.   Psychiatric/Behavioral: Negative for mood changes. The patient is not nervous/anxious.          OBJECTIVE:   /88   Pulse 75   Temp 97.3  F (36.3  C) (Tympanic)   Resp 20   Ht 1.702 m (5' 7\")   Wt 99.3 kg (219 lb)   SpO2 97%   BMI 34.30 kg/m   Estimated body mass index is 35.98 kg/m  as calculated from the following:    Height as of 4/25/18: 1.695 m (5' 6.75\").    Weight as of 4/25/18: 103.4 kg (228 lb).  Physical Exam  Gen: alert and oriented, in no acute distress, affect within normal limits  Neck: supple with no masses or nodes  Throat: oropharynx clear, no exudate or tonsillar/palate asymmetry.    CV: RRR, no murmur  Lungs: clear bilaterally with good effort  Abd: nontender, no mass  Ext: no edema or lesions   Neuro: moving all extremities, gait normal, no focal deficts noted    ASSESSMENT / PLAN:   Wellness visit  DM2 with nephropathy: recheck.   Htn: stable on meds, fills today  Hyperlipidemia: on statin, fills.    Obesity: has come down out of morbid class.      Fills.  Labs.  Recommended shots at pharmacy.  Back in 6 mo, we'll see where A1C is today.        End of Life Planning:  Not addressed    COUNSELING:  Reviewed preventive health counseling, as reflected in patient instructions       Regular exercise       Healthy diet/nutrition    BP Readings from Last 1 Encounters:   04/25/18 130/86     Estimated body mass index is 35.98 kg/m  as calculated from the following:    Height as of 4/25/18: 1.695 m (5' 6.75\").    Weight as of 4/25/18: 103.4 kg (228 lb).      Weight management plan: diet/exercise     reports that  has never smoked. he has never used smokeless tobacco.      Appropriate " preventive services were discussed with this patient, including applicable screening as appropriate for cardiovascular disease, diabetes, osteopenia/osteoporosis, and glaucoma.  As appropriate for age/gender, discussed screening for colorectal cancer, prostate cancer, breast cancer, and cervical cancer. Checklist reviewing preventive services available has been given to the patient.    Reviewed patients plan of care and provided an AVS. The Basic Care Plan (routine screening as documented in Health Maintenance) for Cecilio meets the Care Plan requirement. This Care Plan has been established and reviewed with the Patient.        Damion Morales MD  [facility]

## 2018-12-20 NOTE — LETTER
December 20, 2018      Cecilio Navarro  [address] [place] [phone]        Dear ,    We are writing to inform you of your test results.    Your A1C is going in the wrong direction.  I want to see you back in 4 months and we'll check again.  I think with the changes you've made, if you keep at it it might come down some.    Resulted Orders   Hemoglobin A1c   Result Value Ref Range    Hemoglobin A1C 8.6 (H) 0 - 5.6 %      Comment:      Normal <5.7% Prediabetes 5.7-6.4%  Diabetes 6.5% or higher - adopted from ADA   consensus guidelines.     Albumin Random Urine Quantitative with Creat Ratio   Result Value Ref Range    Creatinine Urine 87 mg/dL    Albumin Urine mg/L 16 mg/L    Albumin Urine mg/g Cr 18.89 (H) 0 - 17 mg/g Cr       If you have any questions or concerns, please call the clinic at the number listed above.       Sincerely,        Damion Morales MD/delbert

## 2018-12-21 ASSESSMENT — PATIENT HEALTH QUESTIONNAIRE - PHQ9: SUM OF ALL RESPONSES TO PHQ QUESTIONS 1-9: 0

## 2019-04-02 ENCOUNTER — OFFICE VISIT (OUTPATIENT)
Dept: DERMATOLOGY | Facility: CLINIC | Age: 79
End: 2019-04-02
Payer: COMMERCIAL

## 2019-04-02 VITALS — DIASTOLIC BLOOD PRESSURE: 74 MMHG | SYSTOLIC BLOOD PRESSURE: 145 MMHG | OXYGEN SATURATION: 95 % | HEART RATE: 68 BPM

## 2019-04-02 DIAGNOSIS — L81.4 LENTIGO: ICD-10-CM

## 2019-04-02 DIAGNOSIS — D23.9 DERMAL NEVUS: ICD-10-CM

## 2019-04-02 DIAGNOSIS — L82.1 SEBORRHEIC KERATOSIS: Primary | ICD-10-CM

## 2019-04-02 DIAGNOSIS — L57.0 AK (ACTINIC KERATOSIS): ICD-10-CM

## 2019-04-02 DIAGNOSIS — D18.00 ANGIOMA: ICD-10-CM

## 2019-04-02 PROCEDURE — 99203 OFFICE O/P NEW LOW 30 MIN: CPT | Performed by: DERMATOLOGY

## 2019-04-02 RX ORDER — FLUOROURACIL 50 MG/G
CREAM TOPICAL 2 TIMES DAILY
Qty: 40 G | Refills: 0 | Status: SHIPPED | OUTPATIENT
Start: 2019-04-02 | End: 2019-04-18 | Stop reason: ALTCHOICE

## 2019-04-02 NOTE — NURSING NOTE
Chief Complaint   Patient presents with     Skin Check       Vitals:    04/02/19 1107   BP: 145/74   Pulse: 68   SpO2: 95%     Wt Readings from Last 1 Encounters:   12/20/18 99.3 kg (219 lb)       [place] LPN.................4/2/2019

## 2019-04-02 NOTE — LETTER
4/2/2019         RE: Cecilio Navarro  [address] [facility] [address]        Dear Colleague,    Thank you for referring your patient, Cecilio Navarro, to the [facility]. Please see a copy of my visit note below.    Cecilio Navarro is a 78 year old year old male patient here today for rough spot son face.   .  Patient states this has been present for  A while.  Patient reports the following symptoms:  rough.  Patient reports the following previous treatments none.  Patient reports the following modifying factors none.  Associated symptoms: none.  Patient has no other skin complaints today.  Remainder of the HPI, Meds, PMH, Allergies, FH, and SH was reviewed in chart.      Past Medical History:   Diagnosis Date     Type II or unspecified type diabetes mellitus without mention of complication, not stated as uncontrolled      Unspecified disorder of male genital organs      Unspecified essential hypertension        Past Surgical History:   Procedure Laterality Date     COLONOSCOPY  10/16/2013    Procedure: COLONOSCOPY;  Colonoscopy;  Surgeon: Garry Ambrose MD;  Location: [facility]     HC REMOVE TONSILS/ADENOIDS,<11 Y/O      T & A <14y.o.        Family History   Problem Relation Age of Onset     Hypertension Mother      Cerebrovascular Disease Mother         mentally affected     Cancer - colorectal Father         mets to liver  age74     Gastrointestinal Disease Brother         multilple colon polyps     Hypertension Brother      Gastrointestinal Disease Sister         reflux     Obesity Son      Allergies Daughter      Arthritis Brother        Social History     Socioeconomic History     Marital status:      Spouse name: Not on file     Number of children: Not on file     Years of education: Not on file     Highest education level: Not on file   Occupational History     Not on file   Social Needs     Financial resource strain: Not on file     Food insecurity:     Worry: Not on  file     Inability: Not on file     Transportation needs:     Medical: Not on file     Non-medical: Not on file   Tobacco Use     Smoking status: Never Smoker     Smokeless tobacco: Never Used   Substance and Sexual Activity     Alcohol use: Yes     Comment: occasional social     Drug use: No     Sexual activity: Yes     Partners: Female   Lifestyle     Physical activity:     Days per week: Not on file     Minutes per session: Not on file     Stress: Not on file   Relationships     Social connections:     Talks on phone: Not on file     Gets together: Not on file     Attends Muslim service: Not on file     Active member of club or organization: Not on file     Attends meetings of clubs or organizations: Not on file     Relationship status: Not on file     Intimate partner violence:     Fear of current or ex partner: Not on file     Emotionally abused: Not on file     Physically abused: Not on file     Forced sexual activity: Not on file   Other Topics Concern      Service No     Blood Transfusions No     Caffeine Concern Yes     Comment: 1-2 day     Occupational Exposure No     Hobby Hazards Yes     Comment: [employer]     Sleep Concern No     Stress Concern No     Weight Concern No     Special Diet Yes     Comment: Diabetic and low fat     Back Care No     Exercise Yes     Bike Helmet Not Asked     Seat Belt Yes     Self-Exams Yes     Comment: check blood sugars     Parent/sibling w/ CABG, MI or angioplasty before 65F 55M? Not Asked   Social History Narrative     Not on file       Outpatient Encounter Medications as of 4/2/2019   Medication Sig Dispense Refill     aspirin 81 MG tablet Take 81 mg by mouth daily       atorvastatin (LIPITOR) 40 MG tablet Take 1 tablet (40 mg) by mouth daily 90 tablet 3     glipiZIDE (GLUCOTROL XL) 10 MG 24 hr tablet Take 1 tablet (10 mg) by mouth daily 90 tablet 3     Ibuprofen (ADVIL) 200 MG capsule Take 400 mg by mouth 2 times daily as needed.        lisinopril-hydrochlorothiazide (PRINZIDE/ZESTORETIC) 20-12.5 MG tablet Take 1 tablet by mouth 2 times daily 180 tablet 3     metFORMIN (GLUCOPHAGE) 1000 MG tablet Take 1 tablet (1,000 mg) by mouth 2 times daily (with meals) 180 tablet 3     sildenafil (VIAGRA) 100 MG cap/tab Take 1 tablet (100 mg) by mouth daily as needed for erectile dysfunction (Patient not taking: Reported on 4/25/2018) 3 tablet 11     vardenafil (LEVITRA) 20 MG tablet Take 1 tablet (20 mg) by mouth daily as needed for erectile dysfunction (Patient not taking: Reported on 4/25/2018) 5 tablet 11     No facility-administered encounter medications on file as of 4/2/2019.              Review Of Systems  Skin: As above  Eyes: negative  Ears/Nose/Throat: negative  Respiratory: No shortness of breath, dyspnea on exertion, cough, or hemoptysis  Cardiovascular: negative  Gastrointestinal: negative  Genitourinary: negative  Musculoskeletal: negative  Neurologic: negative  Psychiatric: negative  Hematologic/Lymphatic/Immunologic: negative  Endocrine: negative      O:   NAD, WDWN, Alert & Oriented, Mood & Affect wnl, Vitals stable   Here today alone   /74   Pulse 68   SpO2 95%    General appearance normal   Vitals stable   Alert, oriented and in no acute distress      Following lymph nodes palpated: Occipital, Cervical, Supraclavicular no lad   Gritty papules on face      Stuck on papules and brown macules on trunk and ext   Red papules on trunk  Flesh colored papules on trunk     The remainder of the full exam was unremarkable; the following areas were examined:  conjunctiva/lids, oral mucosa, neck, peripheral vascular system, abdomen, lymph nodes, digits/nails, eccrine and apocrine glands, scalp/hair, face, neck, chest, abdomen, buttocks, back, RUE, LUE, RLE, LLE       Eyes: Conjunctivae/lids:Normal     ENT: Lips, buccal mucosa, tongue: normal    MSK:Normal    Cardiovascular: peripheral edema none    Pulm: Breathing Normal    Lymph Nodes: No Head  and Neck Lymphadenopathy     Neuro/Psych: Orientation:Normal; Mood/Affect:Normal      A/P:  1. Seborrheic keratosis, lentigo, angioma, dermal nevus  2. Actinic keratosis   Pathophysiology discussed with pateint and information provided   Efudex discussed with patient   Twice daily for two weeks  Irritation discussed with patient     BENIGN LESIONS DISCUSSED WITH PATIENT:  I discussed the specifics of tumor, prognosis, and genetics of benign lesions.  I explained that treatment of these lesions would be purely cosmetic and not medically neccessary.  I discussed with patient different removal options including excision, cautery and /or laser.      Nature and genetics of benign skin lesions dicussed with patient.  Signs and Symptoms of skin cancer discussed with patient.  ABCDEs of melanoma reviewed with patient.  Patient encouraged to perform monthly skin exams.  UV precautions reviewed with patient.  Patient to follow up with Primary Care provider regarding elevated blood pressure.  Skin care regimen reviewed with patient: Eliminate harsh soaps, i.e. Dial, zest, irsih spring; Mild soaps such as Cetaphil or Dove sensitive skin, avoid hot or cold showers, aggressive use of emollients including vanicream, cetaphil or cerave discussed with patient.    Risks of non-melanoma skin cancer discussed with patient   Return to clinic 3 months  Cecilio to follow up with Primary Care provider regarding elevated blood pressure.      Again, thank you for allowing me to participate in the care of your patient.        Sincerely,        Tejinder Jarvis MD

## 2019-04-02 NOTE — PROGRESS NOTES
Cecilio Navarro is a 78 year old year old male patient here today for rough spot son face.   .  Patient states this has been present for  A while.  Patient reports the following symptoms:  rough.  Patient reports the following previous treatments none.  Patient reports the following modifying factors none.  Associated symptoms: none.  Patient has no other skin complaints today.  Remainder of the HPI, Meds, PMH, Allergies, FH, and SH was reviewed in chart.      Past Medical History:   Diagnosis Date     Type II or unspecified type diabetes mellitus without mention of complication, not stated as uncontrolled      Unspecified disorder of male genital organs      Unspecified essential hypertension        Past Surgical History:   Procedure Laterality Date     COLONOSCOPY  10/16/2013    Procedure: COLONOSCOPY;  Colonoscopy;  Surgeon: Garry Ambrose MD;  Location: [facility]     HC REMOVE TONSILS/ADENOIDS,<13 Y/O      T & A <14y.o.        Family History   Problem Relation Age of Onset     Hypertension Mother      Cerebrovascular Disease Mother         mentally affected     Cancer - colorectal Father         mets to liver  age76     Gastrointestinal Disease Brother         multilple colon polyps     Hypertension Brother      Gastrointestinal Disease Sister         reflux     Obesity Son      Allergies Daughter      Arthritis Brother        Social History     Socioeconomic History     Marital status:      Spouse name: Not on file     Number of children: Not on file     Years of education: Not on file     Highest education level: Not on file   Occupational History     Not on file   Social Needs     Financial resource strain: Not on file     Food insecurity:     Worry: Not on file     Inability: Not on file     Transportation needs:     Medical: Not on file     Non-medical: Not on file   Tobacco Use     Smoking status: Never Smoker     Smokeless tobacco: Never Used   Substance and Sexual Activity     Alcohol use:  Yes     Comment: occasional social     Drug use: No     Sexual activity: Yes     Partners: Female   Lifestyle     Physical activity:     Days per week: Not on file     Minutes per session: Not on file     Stress: Not on file   Relationships     Social connections:     Talks on phone: Not on file     Gets together: Not on file     Attends Congregational service: Not on file     Active member of club or organization: Not on file     Attends meetings of clubs or organizations: Not on file     Relationship status: Not on file     Intimate partner violence:     Fear of current or ex partner: Not on file     Emotionally abused: Not on file     Physically abused: Not on file     Forced sexual activity: Not on file   Other Topics Concern      Service No     Blood Transfusions No     Caffeine Concern Yes     Comment: 1-2 day     Occupational Exposure No     Hobby Hazards Yes     Comment: [employer]     Sleep Concern No     Stress Concern No     Weight Concern No     Special Diet Yes     Comment: Diabetic and low fat     Back Care No     Exercise Yes     Bike Helmet Not Asked     Seat Belt Yes     Self-Exams Yes     Comment: check blood sugars     Parent/sibling w/ CABG, MI or angioplasty before 65F 55M? Not Asked   Social History Narrative     Not on file       Outpatient Encounter Medications as of 4/2/2019   Medication Sig Dispense Refill     aspirin 81 MG tablet Take 81 mg by mouth daily       atorvastatin (LIPITOR) 40 MG tablet Take 1 tablet (40 mg) by mouth daily 90 tablet 3     glipiZIDE (GLUCOTROL XL) 10 MG 24 hr tablet Take 1 tablet (10 mg) by mouth daily 90 tablet 3     Ibuprofen (ADVIL) 200 MG capsule Take 400 mg by mouth 2 times daily as needed.       lisinopril-hydrochlorothiazide (PRINZIDE/ZESTORETIC) 20-12.5 MG tablet Take 1 tablet by mouth 2 times daily 180 tablet 3     metFORMIN (GLUCOPHAGE) 1000 MG tablet Take 1 tablet (1,000 mg) by mouth 2 times daily (with meals) 180 tablet 3     sildenafil (VIAGRA) 100 MG  cap/tab Take 1 tablet (100 mg) by mouth daily as needed for erectile dysfunction (Patient not taking: Reported on 4/25/2018) 3 tablet 11     vardenafil (LEVITRA) 20 MG tablet Take 1 tablet (20 mg) by mouth daily as needed for erectile dysfunction (Patient not taking: Reported on 4/25/2018) 5 tablet 11     No facility-administered encounter medications on file as of 4/2/2019.              Review Of Systems  Skin: As above  Eyes: negative  Ears/Nose/Throat: negative  Respiratory: No shortness of breath, dyspnea on exertion, cough, or hemoptysis  Cardiovascular: negative  Gastrointestinal: negative  Genitourinary: negative  Musculoskeletal: negative  Neurologic: negative  Psychiatric: negative  Hematologic/Lymphatic/Immunologic: negative  Endocrine: negative      O:   NAD, WDWN, Alert & Oriented, Mood & Affect wnl, Vitals stable   Here today alone   /74   Pulse 68   SpO2 95%    General appearance normal   Vitals stable   Alert, oriented and in no acute distress      Following lymph nodes palpated: Occipital, Cervical, Supraclavicular no lad   Gritty papules on face      Stuck on papules and brown macules on trunk and ext   Red papules on trunk  Flesh colored papules on trunk     The remainder of the full exam was unremarkable; the following areas were examined:  conjunctiva/lids, oral mucosa, neck, peripheral vascular system, abdomen, lymph nodes, digits/nails, eccrine and apocrine glands, scalp/hair, face, neck, chest, abdomen, buttocks, back, RUE, LUE, RLE, LLE       Eyes: Conjunctivae/lids:Normal     ENT: Lips, buccal mucosa, tongue: normal    MSK:Normal    Cardiovascular: peripheral edema none    Pulm: Breathing Normal    Lymph Nodes: No Head and Neck Lymphadenopathy     Neuro/Psych: Orientation:Normal; Mood/Affect:Normal      A/P:  1. Seborrheic keratosis, lentigo, angioma, dermal nevus  2. Actinic keratosis   Pathophysiology discussed with pateint and information provided   Efudex discussed with patient    Twice daily for two weeks  Irritation discussed with patient     BENIGN LESIONS DISCUSSED WITH PATIENT:  I discussed the specifics of tumor, prognosis, and genetics of benign lesions.  I explained that treatment of these lesions would be purely cosmetic and not medically neccessary.  I discussed with patient different removal options including excision, cautery and /or laser.      Nature and genetics of benign skin lesions dicussed with patient.  Signs and Symptoms of skin cancer discussed with patient.  ABCDEs of melanoma reviewed with patient.  Patient encouraged to perform monthly skin exams.  UV precautions reviewed with patient.  Patient to follow up with Primary Care provider regarding elevated blood pressure.  Skin care regimen reviewed with patient: Eliminate harsh soaps, i.e. Dial, zest, irsih spring; Mild soaps such as Cetaphil or Dove sensitive skin, avoid hot or cold showers, aggressive use of emollients including vanicream, cetaphil or cerave discussed with patient.    Risks of non-melanoma skin cancer discussed with patient   Return to clinic 3 months  Cecilio to follow up with Primary Care provider regarding elevated blood pressure.

## 2019-04-08 ENCOUNTER — TELEPHONE (OUTPATIENT)
Dept: DERMATOLOGY | Facility: CLINIC | Age: 79
End: 2019-04-08

## 2019-04-08 NOTE — TELEPHONE ENCOUNTER
Attempted to call patient. Unable to leave message. Will attempt at later time or tomorrow am.     Thong MCINTOSH RN   [facility]

## 2019-04-08 NOTE — TELEPHONE ENCOUNTER
Patient would prefer to do PDT (stated that was an option as well) instead of Effudex.     Appointment made : 4/18/19 @ 0800.   If patient should not have PDT please advise.     Thong MCINTOSH RN   [facility]

## 2019-04-08 NOTE — TELEPHONE ENCOUNTER
Reason for Call:  Other     Detailed comments: Pt was seen last week was either going to use a cream or the light treatment for pt's face. Pt has decided to try the light therapy but has a couple questions - What are the side effects?     Phone Number Patient can be reached at: [phone]    Best Time: Before 1:30 or after 4:00 today    Can we leave a detailed message on this number? YES    Call taken on 4/8/2019 at 11:44 AM by Denise Behrendt

## 2019-04-09 NOTE — TELEPHONE ENCOUNTER
PDT- patient is here for about 2 hours.   Solution is used on face, left on for 1.5 hours and then activated by light of 16 minutes 40 sec. Under light stings and burns.   Typically will look like a sunburn. Usually skin is back to normal 1-2 weeks after treatment.   No sunlight for 48 hours after light treatment as this can cause blistering.

## 2019-04-09 NOTE — TELEPHONE ENCOUNTER
Advised patient of below upon scheduling. Called patient LM with reminder of process.    Thong MCINTOSH RN   [facility]

## 2019-04-18 ENCOUNTER — OFFICE VISIT (OUTPATIENT)
Dept: DERMATOLOGY | Facility: CLINIC | Age: 79
End: 2019-04-18
Payer: COMMERCIAL

## 2019-04-18 VITALS — DIASTOLIC BLOOD PRESSURE: 85 MMHG | OXYGEN SATURATION: 96 % | HEART RATE: 74 BPM | SYSTOLIC BLOOD PRESSURE: 148 MMHG

## 2019-04-18 DIAGNOSIS — L57.0 ACTINIC KERATOSES: ICD-10-CM

## 2019-04-18 PROCEDURE — 96567 PDT DSTR PRMLG LES SKN: CPT | Performed by: PHYSICIAN ASSISTANT

## 2019-04-18 NOTE — NURSING NOTE
"Initial /85   Pulse 74   SpO2 96%  Estimated body mass index is 34.3 kg/m  as calculated from the following:    Height as of 12/20/18: 1.702 m (5' 7\").    Weight as of 12/20/18: 99.3 kg (219 lb). .    Connie Ocampo LPN    "

## 2019-04-18 NOTE — LETTER
4/18/2019         RE: Cecilio Navarro  [address] [facility] [address]        Dear Colleague,    Thank you for referring your patient, Cecilio Navarro, to the [facility]. Please see a copy of my visit note below.    Cecilio Navarro is a 78 year old year old male patient here today for treatment of actinic keratoses on face and scalp. Decided to avoid efudex and do PDT. No history of cold sores. Patient has no other skin complaints today.  Remainder of the HPI, Meds, PMH, Allergies, FH, and SH was reviewed in chart.    Pertinent Hx:  Actinic keratoses   Past Medical History:   Diagnosis Date     Actinic keratoses      Type II or unspecified type diabetes mellitus without mention of complication, not stated as uncontrolled      Unspecified disorder of male genital organs      Unspecified essential hypertension        Past Surgical History:   Procedure Laterality Date     COLONOSCOPY  10/16/2013    Procedure: COLONOSCOPY;  Colonoscopy;  Surgeon: Garry Ambrose MD;  Location: [facility]     HC REMOVE TONSILS/ADENOIDS,<11 Y/O      T & A <14y.o.        Family History   Problem Relation Age of Onset     Hypertension Mother      Cerebrovascular Disease Mother         mentally affected     Cancer - colorectal Father         mets to liver  age76     Gastrointestinal Disease Brother         multilple colon polyps     Hypertension Brother      Gastrointestinal Disease Sister         reflux     Obesity Son      Allergies Daughter      Arthritis Brother      Melanoma No family hx of        Social History     Socioeconomic History     Marital status:      Spouse name: Not on file     Number of children: Not on file     Years of education: Not on file     Highest education level: Not on file   Occupational History     Not on file   Social Needs     Financial resource strain: Not on file     Food insecurity:     Worry: Not on file     Inability: Not on file     Transportation needs:      Medical: Not on file     Non-medical: Not on file   Tobacco Use     Smoking status: Never Smoker     Smokeless tobacco: Never Used   Substance and Sexual Activity     Alcohol use: Yes     Comment: occasional social     Drug use: No     Sexual activity: Yes     Partners: Female   Lifestyle     Physical activity:     Days per week: Not on file     Minutes per session: Not on file     Stress: Not on file   Relationships     Social connections:     Talks on phone: Not on file     Gets together: Not on file     Attends Mormon service: Not on file     Active member of club or organization: Not on file     Attends meetings of clubs or organizations: Not on file     Relationship status: Not on file     Intimate partner violence:     Fear of current or ex partner: Not on file     Emotionally abused: Not on file     Physically abused: Not on file     Forced sexual activity: Not on file   Other Topics Concern      Service No     Blood Transfusions No     Caffeine Concern Yes     Comment: 1-2 day     Occupational Exposure No     Hobby Hazards Yes     Comment: [employer]     Sleep Concern No     Stress Concern No     Weight Concern No     Special Diet Yes     Comment: Diabetic and low fat     Back Care No     Exercise Yes     Bike Helmet Not Asked     Seat Belt Yes     Self-Exams Yes     Comment: check blood sugars     Parent/sibling w/ CABG, MI or angioplasty before 65F 55M? Not Asked   Social History Narrative     Not on file       Outpatient Encounter Medications as of 4/18/2019   Medication Sig Dispense Refill     aspirin 81 MG tablet Take 81 mg by mouth daily       atorvastatin (LIPITOR) 40 MG tablet Take 1 tablet (40 mg) by mouth daily 90 tablet 3     glipiZIDE (GLUCOTROL XL) 10 MG 24 hr tablet Take 1 tablet (10 mg) by mouth daily 90 tablet 3     Ibuprofen (ADVIL) 200 MG capsule Take 400 mg by mouth 2 times daily as needed.       lisinopril-hydrochlorothiazide (PRINZIDE/ZESTORETIC) 20-12.5 MG tablet Take 1 tablet by  mouth 2 times daily 180 tablet 3     metFORMIN (GLUCOPHAGE) 1000 MG tablet Take 1 tablet (1,000 mg) by mouth 2 times daily (with meals) 180 tablet 3     sildenafil (VIAGRA) 100 MG cap/tab Take 1 tablet (100 mg) by mouth daily as needed for erectile dysfunction (Patient not taking: Reported on 4/25/2018) 3 tablet 11     vardenafil (LEVITRA) 20 MG tablet Take 1 tablet (20 mg) by mouth daily as needed for erectile dysfunction (Patient not taking: Reported on 4/25/2018) 5 tablet 11     [DISCONTINUED] fluorouracil (EFUDEX) 5 % external cream Apply topically 2 times daily Twice daily for two weeks on face 40 g 0     No facility-administered encounter medications on file as of 4/18/2019.              Review Of Systems  Skin: As above  Eyes: negative  Ears/Nose/Throat: negative  Respiratory: No shortness of breath, dyspnea on exertion, cough, or hemoptysis  Cardiovascular: negative  Gastrointestinal: negative  Genitourinary: negative  Musculoskeletal: negative  Neurologic: negative  Psychiatric: negative  Hematologic/Lymphatic/Immunologic: negative  Endocrine: negative      O:   NAD, WDWN, Alert & Oriented, Mood & Affect wnl, Vitals stable   Here today alone   /85   Pulse 74   SpO2 96%    General appearance normal   Vitals stable   Alert, oriented and in no acute distress     Pink gritty papules on face, and scalp       Eyes: Conjunctivae/lids:Normal     ENT: Lips: normal    MSK:Normal    Pulm: Breathing Normal    Neuro/Psych: Orientation:Normal; Mood/Affect:Normal  A/P:  1. Actinic keratoses on scalp and face   PDT: PGACAC discussed. Risks including but not limited to redness, swelling, and blistering to treated area. Patient was instructed to cleanse face thoroughly with a mild cleanser, then face was degreased with acetone. ALA was then applied to face and scalp in a uniform manner. Patient sat for 90 minutes after ALA was applied. The blue light shined on patient's face and scalp for 16 minutes 40 seconds he was  "approximately 2-4\" away from the light. Patient then was instructed to wash face and sunscreen with spf 50 was applied. Instructed patient to avoid sun light for 48 hours and apply sunscreen daily. Follow-up in 2 months.     Lot: [id number]  Exp: 11/20    Again, thank you for allowing me to participate in the care of your patient.        Sincerely,        Lashaun Morris PA-C    "

## 2019-04-18 NOTE — PATIENT INSTRUCTIONS
Possible side effects  Photosensitivity reactions: Reactions caused by light can show up on the skin where the drug is applied. They usually involve redness and a tingling or burning sensation. For about 2 days after the drug is used, you should take care to not expose treated areas of your face and scalp to light.    Stay out of strong, direct light.     Stay indoors as much as possible.     Wear protective clothing and wide-brimmed hats to avoid sunlight when outdoors.     Avoid beaches, snow, light colored concrete, or other surfaces where strong light may be reflected.  Sunscreens will not protect the skin from photosensitivity reactions.  Skin changes: The treated skin will likely turn red and may swell after treatment. Maybe mild or severe. Can use solarcaine, aquaphor or OTC hydrocortisone to help with the irritation after treatment. Can use cool compresses if uncomfortable. If needed you can take tylenol or ibuprofen to help with your discomfort, as long as these medications are considered safe for you. This usually peaks about a day after treatment and gets better within a week. It should be gone about 4 weeks after treatment. The skin may also be itchy or change color after treatment.  Recommended General Skin care:   Eliminate harsh soaps, i.e. Dial, Zest, Maribel spring.  Use mild soaps such as Cetaphil or Dove sensitive skin.  Avoid overly hot or cold showers.  Use Vanicream, Cetaphil, Eucerin or Cerave creams to moisturize your skin.  Scoop-able creams are better than thin lotions.  Apply moisturizer immediately to damp skin after patting skin dry with towel.   [employer] Public inFormation Center:   Informative resources for the public to learn more about   skin conditions, tips on performing self-examinations, sun   safety, and more The public can find information online at   www aad org or by calling [phone]       Please call [place] with any questions over weekend:  [phone]

## 2019-04-18 NOTE — PROGRESS NOTES
Cecilio Navarro is a 78 year old year old male patient here today for treatment of actinic keratoses on face and scalp. Decided to avoid efudex and do PDT. No history of cold sores. Patient has no other skin complaints today.  Remainder of the HPI, Meds, PMH, Allergies, FH, and SH was reviewed in chart.    Pertinent Hx:  Actinic keratoses   Past Medical History:   Diagnosis Date     Actinic keratoses      Type II or unspecified type diabetes mellitus without mention of complication, not stated as uncontrolled      Unspecified disorder of male genital organs      Unspecified essential hypertension        Past Surgical History:   Procedure Laterality Date     COLONOSCOPY  10/16/2013    Procedure: COLONOSCOPY;  Colonoscopy;  Surgeon: Garry Ambrose MD;  Location: [facility]     HC REMOVE TONSILS/ADENOIDS,<11 Y/O      T & A <14y.o.        Family History   Problem Relation Age of Onset     Hypertension Mother      Cerebrovascular Disease Mother         mentally affected     Cancer - colorectal Father         mets to liver  age76     Gastrointestinal Disease Brother         multilple colon polyps     Hypertension Brother      Gastrointestinal Disease Sister         reflux     Obesity Son      Allergies Daughter      Arthritis Brother      Melanoma No family hx of        Social History     Socioeconomic History     Marital status:      Spouse name: Not on file     Number of children: Not on file     Years of education: Not on file     Highest education level: Not on file   Occupational History     Not on file   Social Needs     Financial resource strain: Not on file     Food insecurity:     Worry: Not on file     Inability: Not on file     Transportation needs:     Medical: Not on file     Non-medical: Not on file   Tobacco Use     Smoking status: Never Smoker     Smokeless tobacco: Never Used   Substance and Sexual Activity     Alcohol use: Yes     Comment: occasional social     Drug use: No     Sexual  activity: Yes     Partners: Female   Lifestyle     Physical activity:     Days per week: Not on file     Minutes per session: Not on file     Stress: Not on file   Relationships     Social connections:     Talks on phone: Not on file     Gets together: Not on file     Attends Islam service: Not on file     Active member of club or organization: Not on file     Attends meetings of clubs or organizations: Not on file     Relationship status: Not on file     Intimate partner violence:     Fear of current or ex partner: Not on file     Emotionally abused: Not on file     Physically abused: Not on file     Forced sexual activity: Not on file   Other Topics Concern      Service No     Blood Transfusions No     Caffeine Concern Yes     Comment: 1-2 day     Occupational Exposure No     Hobby Hazards Yes     Comment: [place]     Sleep Concern No     Stress Concern No     Weight Concern No     Special Diet Yes     Comment: Diabetic and low fat     Back Care No     Exercise Yes     Bike Helmet Not Asked     Seat Belt Yes     Self-Exams Yes     Comment: check blood sugars     Parent/sibling w/ CABG, MI or angioplasty before 65F 55M? Not Asked   Social History Narrative     Not on file       Outpatient Encounter Medications as of 4/18/2019   Medication Sig Dispense Refill     aspirin 81 MG tablet Take 81 mg by mouth daily       atorvastatin (LIPITOR) 40 MG tablet Take 1 tablet (40 mg) by mouth daily 90 tablet 3     glipiZIDE (GLUCOTROL XL) 10 MG 24 hr tablet Take 1 tablet (10 mg) by mouth daily 90 tablet 3     Ibuprofen (ADVIL) 200 MG capsule Take 400 mg by mouth 2 times daily as needed.       lisinopril-hydrochlorothiazide (PRINZIDE/ZESTORETIC) 20-12.5 MG tablet Take 1 tablet by mouth 2 times daily 180 tablet 3     metFORMIN (GLUCOPHAGE) 1000 MG tablet Take 1 tablet (1,000 mg) by mouth 2 times daily (with meals) 180 tablet 3     sildenafil (VIAGRA) 100 MG cap/tab Take 1 tablet (100 mg) by mouth daily as needed for  "erectile dysfunction (Patient not taking: Reported on 4/25/2018) 3 tablet 11     vardenafil (LEVITRA) 20 MG tablet Take 1 tablet (20 mg) by mouth daily as needed for erectile dysfunction (Patient not taking: Reported on 4/25/2018) 5 tablet 11     [DISCONTINUED] fluorouracil (EFUDEX) 5 % external cream Apply topically 2 times daily Twice daily for two weeks on face 40 g 0     No facility-administered encounter medications on file as of 4/18/2019.              Review Of Systems  Skin: As above  Eyes: negative  Ears/Nose/Throat: negative  Respiratory: No shortness of breath, dyspnea on exertion, cough, or hemoptysis  Cardiovascular: negative  Gastrointestinal: negative  Genitourinary: negative  Musculoskeletal: negative  Neurologic: negative  Psychiatric: negative  Hematologic/Lymphatic/Immunologic: negative  Endocrine: negative      O:   NAD, WDWN, Alert & Oriented, Mood & Affect wnl, Vitals stable   Here today alone   /85   Pulse 74   SpO2 96%    General appearance normal   Vitals stable   Alert, oriented and in no acute distress     Pink gritty papules on face, and scalp       Eyes: Conjunctivae/lids:Normal     ENT: Lips: normal    MSK:Normal    Pulm: Breathing Normal    Neuro/Psych: Orientation:Normal; Mood/Affect:Normal  A/P:  1. Actinic keratoses on scalp and face   PDT: PGACAC discussed. Risks including but not limited to redness, swelling, and blistering to treated area. Patient was instructed to cleanse face thoroughly with a mild cleanser, then face was degreased with acetone. ALA was then applied to face and scalp in a uniform manner. Patient sat for 90 minutes after ALA was applied. The blue light shined on patient's face and scalp for 16 minutes 40 seconds he was approximately 2-4\" away from the light. Patient then was instructed to wash face and sunscreen with spf 50 was applied. Instructed patient to avoid sun light for 48 hours and apply sunscreen daily. Follow-up in 2 months.     Lot: " [id number]  Exp: 11/20

## 2019-05-20 ENCOUNTER — OFFICE VISIT (OUTPATIENT)
Dept: FAMILY MEDICINE | Facility: CLINIC | Age: 79
End: 2019-05-20
Payer: COMMERCIAL

## 2019-05-20 VITALS
BODY MASS INDEX: 33.59 KG/M2 | HEIGHT: 67 IN | SYSTOLIC BLOOD PRESSURE: 126 MMHG | DIASTOLIC BLOOD PRESSURE: 80 MMHG | HEART RATE: 76 BPM | TEMPERATURE: 97.6 F | WEIGHT: 214 LBS | RESPIRATION RATE: 18 BRPM

## 2019-05-20 DIAGNOSIS — E78.5 HYPERLIPIDEMIA LDL GOAL <100: ICD-10-CM

## 2019-05-20 DIAGNOSIS — I10 HTN, GOAL BELOW 140/90: ICD-10-CM

## 2019-05-20 DIAGNOSIS — E11.21 TYPE 2 DIABETES MELLITUS WITH DIABETIC NEPHROPATHY, WITHOUT LONG-TERM CURRENT USE OF INSULIN (H): Primary | ICD-10-CM

## 2019-05-20 LAB
ANION GAP SERPL CALCULATED.3IONS-SCNC: 4 MMOL/L (ref 3–14)
BUN SERPL-MCNC: 13 MG/DL (ref 7–30)
CALCIUM SERPL-MCNC: 9.4 MG/DL (ref 8.5–10.1)
CHLORIDE SERPL-SCNC: 104 MMOL/L (ref 94–109)
CO2 SERPL-SCNC: 32 MMOL/L (ref 20–32)
CREAT SERPL-MCNC: 0.88 MG/DL (ref 0.66–1.25)
GFR SERPL CREATININE-BSD FRML MDRD: 83 ML/MIN/{1.73_M2}
GLUCOSE SERPL-MCNC: 158 MG/DL (ref 70–99)
HBA1C MFR BLD: 7.9 % (ref 0–5.6)
POTASSIUM SERPL-SCNC: 4 MMOL/L (ref 3.4–5.3)
SODIUM SERPL-SCNC: 140 MMOL/L (ref 133–144)
TSH SERPL DL<=0.005 MIU/L-ACNC: 1.77 MU/L (ref 0.4–4)

## 2019-05-20 PROCEDURE — 80048 BASIC METABOLIC PNL TOTAL CA: CPT | Performed by: FAMILY MEDICINE

## 2019-05-20 PROCEDURE — 36415 COLL VENOUS BLD VENIPUNCTURE: CPT | Performed by: FAMILY MEDICINE

## 2019-05-20 PROCEDURE — 99214 OFFICE O/P EST MOD 30 MIN: CPT | Performed by: FAMILY MEDICINE

## 2019-05-20 PROCEDURE — 84443 ASSAY THYROID STIM HORMONE: CPT | Performed by: FAMILY MEDICINE

## 2019-05-20 PROCEDURE — 83036 HEMOGLOBIN GLYCOSYLATED A1C: CPT | Performed by: FAMILY MEDICINE

## 2019-05-20 ASSESSMENT — MIFFLIN-ST. JEOR: SCORE: 1659.33

## 2019-05-20 NOTE — PROGRESS NOTES
"Subjective     Cecilio Navarro is a 78 year old male who presents to clinic today for the following health issues:    HPI   Diabetes Follow-up      How often are you checking your blood sugar? Not at all    What time of day are you checking your blood sugars (select all that apply)?  NA    Have you had any blood sugars above 200?  NA    Have you had any blood sugars below 70?  NA    What symptoms do you notice when your blood sugar is low?  Other: Unknown    What concerns do you have today about your diabetes? None     Do you have any of these symptoms? (Select all that apply)  Numbness in feet mild     Have you had a diabetic eye exam in the last 12 months? No - pt due     BP Readings from Last 2 Encounters:   04/18/19 148/85   04/02/19 145/74     Hemoglobin A1C (%)   Date Value   12/20/2018 8.6 (H)   04/24/2018 8.3 (H)     LDL Cholesterol Calculated (mg/dL)   Date Value   04/24/2018 50   01/20/2016 39       S: . Cecilio Navarro is a 78 year old male with Diabetes 2.  Improved with some diet changes.  2 meds.  Some neuropathy off/on.  No longer in morbid obesity category      Htn: improved with some wt loss.  Ace/hctz    Hyperlipidemia: statin.  tolerating    Problem list, med list, additional histories reviewed and updated, as indicated.      O:/80 (BP Location: Right arm, Patient Position: Chair, Cuff Size: Adult Regular)   Pulse 76   Temp 97.6  F (36.4  C) (Tympanic)   Resp 18   Ht 1.702 m (5' 7\")   Wt 97.1 kg (214 lb)   BMI 33.52 kg/m    GEN: Alert and oriented, in no acute distress  CV: RRR, no murmur  EXT: no edema or lesions noted in lower extremities    A: DM2, neuropathy, stable       Htn, stable       Hyperlipidemia, stable    P: congratulated him.  Finally lost some wt.  Doing well. Back in 6 mo.  Stay on current meds    Weight management plan: diet/exercise           "

## 2019-05-20 NOTE — NURSING NOTE
"Chief Complaint   Patient presents with     Diabetes       Initial /80 (BP Location: Right arm, Patient Position: Chair, Cuff Size: Adult Regular)   Pulse 76   Temp 97.6  F (36.4  C) (Tympanic)   Resp 18   Ht 1.702 m (5' 7\")   Wt 97.1 kg (214 lb)   BMI 33.52 kg/m   Estimated body mass index is 33.52 kg/m  as calculated from the following:    Height as of this encounter: 1.702 m (5' 7\").    Weight as of this encounter: 97.1 kg (214 lb).    Patient presents to the clinic using No DME    Health Maintenance that is potentially due pending provider review:  NONE    Tori Merrill MA  9:23 AM 5/20/2019  .      "

## 2019-05-20 NOTE — LETTER
May 20, 2019      Cecilio Navarro  [address] [place] [id number]        Dear ,    We are writing to inform you of your test results.    Labs look good.  See you in 6 months.  Good job on the weight loss!!    Resulted Orders   Hemoglobin A1c   Result Value Ref Range    Hemoglobin A1C 7.9 (H) 0 - 5.6 %      Comment:      Normal <5.7% Prediabetes 5.7-6.4%  Diabetes 6.5% or higher - adopted from ADA   consensus guidelines.     Basic metabolic panel  (Ca, Cl, CO2, Creat, Gluc, K, Na, BUN)   Result Value Ref Range    Sodium 140 133 - 144 mmol/L    Potassium 4.0 3.4 - 5.3 mmol/L    Chloride 104 94 - 109 mmol/L    Carbon Dioxide 32 20 - 32 mmol/L    Anion Gap 4 3 - 14 mmol/L    Glucose 158 (H) 70 - 99 mg/dL    Urea Nitrogen 13 7 - 30 mg/dL    Creatinine 0.88 0.66 - 1.25 mg/dL    GFR Estimate 83 >60 mL/min/[1.73_m2]      Comment:      Non  GFR Calc  Starting 12/18/2018, serum creatinine based estimated GFR (eGFR) will be   calculated using the Chronic Kidney Disease Epidemiology Collaboration   (CKD-EPI) equation.      GFR Estimate If Black >90 >60 mL/min/[1.73_m2]      Comment:       GFR Calc  Starting 12/18/2018, serum creatinine based estimated GFR (eGFR) will be   calculated using the Chronic Kidney Disease Epidemiology Collaboration   (CKD-EPI) equation.      Calcium 9.4 8.5 - 10.1 mg/dL   TSH with free T4 reflex   Result Value Ref Range    TSH 1.77 0.40 - 4.00 mU/L       If you have any questions or concerns, please call the clinic at the number listed above.       Sincerely,        Damion Morales MD

## 2019-06-24 ENCOUNTER — OFFICE VISIT (OUTPATIENT)
Dept: FAMILY MEDICINE | Facility: CLINIC | Age: 79
End: 2019-06-24
Payer: COMMERCIAL

## 2019-06-24 VITALS
HEIGHT: 67 IN | DIASTOLIC BLOOD PRESSURE: 70 MMHG | SYSTOLIC BLOOD PRESSURE: 128 MMHG | BODY MASS INDEX: 33.53 KG/M2 | WEIGHT: 213.6 LBS | HEART RATE: 71 BPM | OXYGEN SATURATION: 99 % | TEMPERATURE: 98.7 F | RESPIRATION RATE: 19 BRPM
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
## 2019-06-24 DIAGNOSIS — S90.869A: Primary | ICD-10-CM

## 2019-06-24 PROCEDURE — 10120 INC&RMVL FB SUBQ TISS SMPL: CPT | Performed by: NURSE PRACTITIONER

## 2019-06-24 ASSESSMENT — MIFFLIN-ST. JEOR: SCORE: 1657.51

## 2019-06-24 NOTE — PROGRESS NOTES
SUBJECTIVE   Cecilio Navarro is a 78 year old male who presents with     Concern - tick bite  Onset: 3 days ago    Description:   Side of left foot a regular tick patient pulled off itch red    Intensity: moderate    Progression of Symptoms:  same    Accompanying Signs & Symptoms:  Itching,red  Therapies Tried and outcome: nothing    Concerned that there is an indurated area that is firm and hard       PCP   Damion Morales [phone]    Health Maintenance        Health Maintenance Due   Topic Date Due     ZOSTER IMMUNIZATION (1 of 2) 07/27/1992     DIABETIC FOOT EXAM  12/16/2014     EYE EXAM  02/28/2015     DTAP/TDAP/TD IMMUNIZATION (3 - Td) 10/01/2017     PHQ-2  01/01/2019     ADVANCE CARE PLANNING  02/28/2019     LIPID  04/24/2019       HPI        Patient Active Problem List   Diagnosis     HTN, goal below 140/90     Impotence of Organic Origin     Testosterone deficiency     HYPERLIPIDEMIA LDL GOAL <100     BPH with obstruction/lower urinary tract symptoms     Hydrocele of testis     Advanced directives, counseling/discussion     Morbid obesity (H)     Type 2 diabetes mellitus with diabetic nephropathy, without long-term current use of insulin (H)     Actinic keratoses     Current Outpatient Medications   Medication     aspirin 81 MG tablet     atorvastatin (LIPITOR) 40 MG tablet     glipiZIDE (GLUCOTROL XL) 10 MG 24 hr tablet     Ibuprofen (ADVIL) 200 MG capsule     lisinopril-hydrochlorothiazide (PRINZIDE/ZESTORETIC) 20-12.5 MG tablet     metFORMIN (GLUCOPHAGE) 1000 MG tablet     sildenafil (VIAGRA) 100 MG cap/tab     vardenafil (LEVITRA) 20 MG tablet     No current facility-administered medications for this visit.        Patient Active Problem List   Diagnosis     HTN, goal below 140/90     Impotence of Organic Origin     Testosterone deficiency     HYPERLIPIDEMIA LDL GOAL <100     BPH with obstruction/lower urinary tract symptoms     Hydrocele of testis     Advanced directives, counseling/discussion  "    Morbid obesity (H)     Type 2 diabetes mellitus with diabetic nephropathy, without long-term current use of insulin (H)     Actinic keratoses     Past Surgical History:   Procedure Laterality Date     COLONOSCOPY  10/16/2013    Procedure: COLONOSCOPY;  Colonoscopy;  Surgeon: Garry Ambrose MD;  Location: [facility]     HC REMOVE TONSILS/ADENOIDS,<11 Y/O      T & A <14y.o.       Social History     Tobacco Use     Smoking status: Never Smoker     Smokeless tobacco: Never Used   Substance Use Topics     Alcohol use: Yes     Comment: occasional social     Family History   Problem Relation Age of Onset     Hypertension Mother      Cerebrovascular Disease Mother         mentally affected     Cancer - colorectal Father         mets to liver  age76     Gastrointestinal Disease Brother         multilple colon polyps     Hypertension Brother      Gastrointestinal Disease Sister         reflux     Obesity Son      Allergies Daughter      Arthritis Brother      Melanoma No family hx of            Reviewed and updated:  Tobacco  Allergies  Meds  Med Hx  Surg Hx  Fam Hx  Soc Hx     ROS:  Constitutional, HEENT, cardiovascular, pulmonary, gi and gu systems are negative, except as otherwise noted.    PHYSICAL EXAM   /70 (BP Location: Right arm, Patient Position: Sitting, Cuff Size: Adult Large)   Pulse 71   Temp 98.7  F (37.1  C) (Tympanic)   Resp 19   Ht 1.702 m (5' 7\")   Wt 96.9 kg (213 lb 9.6 oz)   SpO2 99%   BMI 33.45 kg/m    Body mass index is 33.45 kg/m .  GENERAL: healthy, alert and no distress  RESP: lungs clear to auscultation - no rales, rhonchi or wheezes  CV: regular rate and rhythm, normal S1 S2, no S3 or S4, no murmur, click or rub, no peripheral edema and peripheral pulses strong  ABDOMEN: soft, nontender, no hepatosplenomegaly, no masses and bowel sounds normal  SKIN: on the bottom of the left foot there is a small 8 mm blistered lesion- there appears to be a small black foreign object in " the center  Surrounding skin is without erythema   The area is tender     Area of foot was cleansed with betadine   Using an 11 inch blade a simple stab incision was made   Unable to visualize intact object after removal   Appears to be removed after above procedure no longer tender or firm       Plan:   1. Tick bite of foot, initial encounter  Area drained today and appeared that foreign body was removed   No need for antibiotic as does not appear infected   Patient will monitor     - REMOVAL FOREIGN BODY FOOT, SUBCUTANEOUS    Risks, benefits, side effects and rationale for treatment plan fully discussed with the patient and understanding expressed.

## 2019-06-24 NOTE — PATIENT INSTRUCTIONS
Opened and drained tick bit on left foot  Feel that hard lump was removed- unable to get object intact     recommend soaking 20 minutes daily with warm water and mild soap for 1 week     Keep covered with antibiotic ointment and band aid until healed       Patient Education     Tick Bite (No Antibiotics)    You have been bitten by a tick. Ticks are small arachnids that feed on the blood of rodents, rabbits, birds, deer, dogs, and people. A tick bite may cause a reaction like a spider bite. You may have redness, itching, and slight swelling at the site. Sometimes you may have no reaction where the tick bit you.  Most tick bites are harmless. But some ticks carry diseases, such as Lyme disease or [place] spotted fever. These can be passed to people at the time of the bite. Lyme disease is of greatest concern. Right now you have no symptoms of Lyme disease or other serious reaction to the bite. It is important to watch for the warning signs, which could appear weeks to months after the tick bite.  Home care  The following will help you care for your bite at home:    If itching is a problem, avoid tight clothing and anything that heats up your skin. This includes hot showers or baths and direct sunlight. This will tend to make the itching worse.    An ice pack will reduce local areas of redness and itching. Make your own ice pack by putting ice cubes in a zip-top plastic bag and wrapping it in a thin towel. Creams containing benzocaine will reduce itching. These creams are available over the counter.    You can use an antihistamine with diphenhydramine if your doctor did not give you another antihistamine. This medicine may be used to reduce itching if large areas of the skin are involved. It is available at drugstores and groceries. If symptoms continue, talk with your doctor or pharmacist about over-the-counter medicines.  Follow-up care  Follow up with your healthcare provider, or as advised.  When to seek  medical advice  Call your healthcare provider right away if any of these occur:  Signs of local infection. Watch for these during the next few days.    Increasing redness around the bite site    Increased pain or swelling    Fever over 100.4 F (38.0 C), or as directed by your healthcare provider    Fluid draining from the bite area  Signs of tick-related disease. Watch for these during the next few weeks to months.    Circular, red, ring-like rash appears at the bite area within 1 to 3 weeks    A non-itchy red rash develops on your wrists or ankles and spreads. It may become purple (petechiae).    Red eyes    Tiredness, fever or chills, nausea or vomiting    Neck pain or stiffness, headache, or confusion    Muscle or bone aches    Irregular or rapid heartbeat    Joint pain or swelling, especially in the knee    Numbness, tingling, or weakness in the arms or legs    Weakness on one side of the face  Date Last Reviewed: 10/1/2016    2403-4662 [employer] [address] [place] [address], [place] [address]. All rights reserved. This information is not intended as a substitute for professional medical care. Always follow your healthcare professional's instructions.

## 2019-07-02 ENCOUNTER — TELEPHONE (OUTPATIENT)
Dept: FAMILY MEDICINE | Facility: CLINIC | Age: 79
End: 2019-07-02

## 2019-07-02 NOTE — TELEPHONE ENCOUNTER
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  Pt's wife is calling to talk to the nurse.  Call to Triage.

## 2019-07-02 NOTE — TELEPHONE ENCOUNTER
Spoke with Migel, he did not pass out but does feel dizzy and has some nausea,  he is home now and drinking some water, he feels actually a little worse now and is going to go to ED for evaluation.

## 2019-07-15 ENCOUNTER — OFFICE VISIT (OUTPATIENT)
Dept: FAMILY MEDICINE | Facility: CLINIC | Age: 79
End: 2019-07-15
Payer: COMMERCIAL

## 2019-07-15 ENCOUNTER — OFFICE VISIT (OUTPATIENT)
Dept: DERMATOLOGY | Facility: CLINIC | Age: 79
End: 2019-07-15
Payer: COMMERCIAL

## 2019-07-15 VITALS
TEMPERATURE: 97.6 F | OXYGEN SATURATION: 97 % | SYSTOLIC BLOOD PRESSURE: 118 MMHG | HEIGHT: 67 IN | WEIGHT: 213 LBS | DIASTOLIC BLOOD PRESSURE: 76 MMHG | BODY MASS INDEX: 33.43 KG/M2 | RESPIRATION RATE: 18 BRPM | HEART RATE: 78 BPM

## 2019-07-15 VITALS — SYSTOLIC BLOOD PRESSURE: 135 MMHG | OXYGEN SATURATION: 96 % | DIASTOLIC BLOOD PRESSURE: 85 MMHG | HEART RATE: 76 BPM

## 2019-07-15 DIAGNOSIS — I10 HTN, GOAL BELOW 140/90: ICD-10-CM

## 2019-07-15 DIAGNOSIS — Z87.2 HISTORY OF ACTINIC KERATOSES: Primary | ICD-10-CM

## 2019-07-15 DIAGNOSIS — L82.1 SEBORRHEIC KERATOSIS: ICD-10-CM

## 2019-07-15 PROCEDURE — 99213 OFFICE O/P EST LOW 20 MIN: CPT | Performed by: FAMILY MEDICINE

## 2019-07-15 PROCEDURE — 99213 OFFICE O/P EST LOW 20 MIN: CPT | Performed by: DERMATOLOGY

## 2019-07-15 RX ORDER — LISINOPRIL AND HYDROCHLOROTHIAZIDE 12.5; 2 MG/1; MG/1
1 TABLET ORAL DAILY
Qty: 90 TABLET | Refills: 2 | COMMUNITY
Start: 2019-07-15 | End: 2019-11-07

## 2019-07-15 ASSESSMENT — MIFFLIN-ST. JEOR: SCORE: 1654.79

## 2019-07-15 NOTE — PROGRESS NOTES
"Subjective     Cecilio Navarro is a 78 year old male who presents to clinic today for the following health issues:    HPI       1.) Dizzy/Faint Feeling  - has had 2 episodes of feeling faint and dizzy  - slight headache currently  - checked blood sugar on day of episode was 176  - was hot out. Bent over and stood up. Was putting a tire on a tractor  - hydrated well ?        s: Cecilio Navarro is a 78 year old male with htn.  Has improved with wt loss.  Doing well.  But now having some dizzy/faint spells if hot/dehydrated.  BP running lower with wt loss.    On 40/25 on ace/hctz.  Need to cut that in half    Problem list, med list, additional histories reviewed and updated, as indicated.      O;/76 (BP Location: Right arm, Patient Position: Chair, Cuff Size: Adult Large)   Pulse 78   Temp 97.6  F (36.4  C) (Tympanic)   Resp 18   Ht 1.702 m (5' 7\")   Wt 96.6 kg (213 lb)   SpO2 97%   BMI 33.36 kg/m    GEN: Alert and oriented, in no acute distress  Cleaned some wax out of ears    A: htn, overtreating    P: cut to 1/2 on BPmeds.  Updated med list.  If sx not improving, will f/u.    "

## 2019-07-15 NOTE — LETTER
7/15/2019         RE: Cecilio Navarro  [address] [facility] [id number]        Dear Colleague,    Thank you for referring your patient, Cecilio Navarro, to the [facility]. Please see a copy of my visit note below.    Cecilio Navarro is a 78 year old year old male patient here today for f/u pdt, did great no issues. He notes one spot on left neck.   .  Patient states this has been present for a while.  Patient reports the following symptoms:  none.  Patient reports the following previous treatments none.  Patient reports the following modifying factors none.  Associated symptoms: none.  Patient has no other skin complaints today.  Remainder of the HPI, Meds, PMH, Allergies, FH, and SH was reviewed in chart.      Past Medical History:   Diagnosis Date     Actinic keratoses      Type II or unspecified type diabetes mellitus without mention of complication, not stated as uncontrolled      Unspecified disorder of male genital organs      Unspecified essential hypertension        Past Surgical History:   Procedure Laterality Date     COLONOSCOPY  10/16/2013    Procedure: COLONOSCOPY;  Colonoscopy;  Surgeon: Garry Ambrose MD;  Location: [facility]     HC REMOVE TONSILS/ADENOIDS,<13 Y/O      T & A <14y.o.        Family History   Problem Relation Age of Onset     Hypertension Mother      Cerebrovascular Disease Mother         mentally affected     Cancer - colorectal Father         mets to liver  age76     Gastrointestinal Disease Brother         multilple colon polyps     Hypertension Brother      Gastrointestinal Disease Sister         reflux     Obesity Son      Allergies Daughter      Arthritis Brother      Melanoma No family hx of        Social History     Socioeconomic History     Marital status:      Spouse name: Not on file     Number of children: Not on file     Years of education: Not on file     Highest education level: Not on file   Occupational History     Not on file    Social Needs     Financial resource strain: Not on file     Food insecurity:     Worry: Not on file     Inability: Not on file     Transportation needs:     Medical: Not on file     Non-medical: Not on file   Tobacco Use     Smoking status: Never Smoker     Smokeless tobacco: Never Used   Substance and Sexual Activity     Alcohol use: Yes     Comment: occasional social     Drug use: No     Sexual activity: Yes     Partners: Female   Lifestyle     Physical activity:     Days per week: Not on file     Minutes per session: Not on file     Stress: Not on file   Relationships     Social connections:     Talks on phone: Not on file     Gets together: Not on file     Attends Jehovah's witness service: Not on file     Active member of club or organization: Not on file     Attends meetings of clubs or organizations: Not on file     Relationship status: Not on file     Intimate partner violence:     Fear of current or ex partner: Not on file     Emotionally abused: Not on file     Physically abused: Not on file     Forced sexual activity: Not on file   Other Topics Concern      Service No     Blood Transfusions No     Caffeine Concern Yes     Comment: 1-2 day     Occupational Exposure No     Hobby Hazards Yes     Comment: [employer]     Sleep Concern No     Stress Concern No     Weight Concern No     Special Diet Yes     Comment: Diabetic and low fat     Back Care No     Exercise Yes     Bike Helmet Not Asked     Seat Belt Yes     Self-Exams Yes     Comment: check blood sugars     Parent/sibling w/ CABG, MI or angioplasty before 65F 55M? Not Asked   Social History Narrative     Not on file       Outpatient Encounter Medications as of 7/15/2019   Medication Sig Dispense Refill     aspirin 81 MG tablet Take 81 mg by mouth daily       atorvastatin (LIPITOR) 40 MG tablet Take 1 tablet (40 mg) by mouth daily 90 tablet 3     glipiZIDE (GLUCOTROL XL) 10 MG 24 hr tablet Take 1 tablet (10 mg) by mouth daily 90 tablet 3     Ibuprofen  (ADVIL) 200 MG capsule Take 400 mg by mouth 2 times daily as needed.       lisinopril-hydrochlorothiazide (PRINZIDE/ZESTORETIC) 20-12.5 MG tablet Take 1 tablet by mouth 2 times daily 180 tablet 3     metFORMIN (GLUCOPHAGE) 1000 MG tablet Take 1 tablet (1,000 mg) by mouth 2 times daily (with meals) 180 tablet 3     sildenafil (VIAGRA) 100 MG cap/tab Take 1 tablet (100 mg) by mouth daily as needed for erectile dysfunction 3 tablet 11     vardenafil (LEVITRA) 20 MG tablet Take 1 tablet (20 mg) by mouth daily as needed for erectile dysfunction 5 tablet 11     No facility-administered encounter medications on file as of 7/15/2019.              Review Of Systems  Skin: As above  Eyes: negative  Ears/Nose/Throat: negative  Respiratory: No shortness of breath, dyspnea on exertion, cough, or hemoptysis  Cardiovascular: negative  Gastrointestinal: negative  Genitourinary: negative  Musculoskeletal: negative  Neurologic: negative  Psychiatric: negative  Hematologic/Lymphatic/Immunologic: negative  Endocrine: negative      O:   NAD, WDWN, Alert & Oriented, Mood & Affect wnl, Vitals stable   Here today alone   /85 (BP Location: Left arm, Patient Position: Sitting, Cuff Size: Adult Regular)   Pulse 76   SpO2 96%    General appearance normal   Vitals stable   Alert, oriented and in no acute distress     L neck stuck on appule   Face clear of actinic keratosis       Eyes: Conjunctivae/lids:Normal     ENT: Lips, buccal mucosa, tongue: normal    MSK:Normal    Cardiovascular: peripheral edema none    Pulm: Breathing Normal    Neuro/Psych: Orientation:Normal; Mood/Affect:Normal      A/P:  1. Actinic keratosis s/p pdt did well  2. Seborrheic keratosis   BENIGN LESIONS DISCUSSED WITH PATIENT:  I discussed the specifics of tumor, prognosis, and genetics of benign lesions.  I explained that treatment of these lesions would be purely cosmetic and not medically neccessary.  I discussed with patient different removal options including  excision, cautery and /or laser.      Nature and genetics of benign skin lesions dicussed with patient.  Signs and Symptoms of skin cancer discussed with patient.  Patient encouraged to perform monthly skin exams.  UV precautions reviewed with patient.  Skin care regimen reviewed with patient: Eliminate harsh soaps, i.e. Dial, zest, irsih spring; Mild soaps such as Cetaphil or Dove sensitive skin, avoid hot or cold showers, aggressive use of emollients including vanicream, cetaphil or cerave discussed with patient.    Risks of non-melanoma skin cancer discussed with patient   Return to clinic 6 months      Again, thank you for allowing me to participate in the care of your patient.        Sincerely,        Tejinder Jarvis MD

## 2019-07-15 NOTE — PROGRESS NOTES
Cecilio Navarro is a 78 year old year old male patient here today for f/u pdt, did great no issues. He notes one spot on left neck.   .  Patient states this has been present for a while.  Patient reports the following symptoms:  none.  Patient reports the following previous treatments none.  Patient reports the following modifying factors none.  Associated symptoms: none.  Patient has no other skin complaints today.  Remainder of the HPI, Meds, PMH, Allergies, FH, and SH was reviewed in chart.      Past Medical History:   Diagnosis Date     Actinic keratoses      Type II or unspecified type diabetes mellitus without mention of complication, not stated as uncontrolled      Unspecified disorder of male genital organs      Unspecified essential hypertension        Past Surgical History:   Procedure Laterality Date     COLONOSCOPY  10/16/2013    Procedure: COLONOSCOPY;  Colonoscopy;  Surgeon: Garry Ambrose MD;  Location: [facility]     HC REMOVE TONSILS/ADENOIDS,<11 Y/O      T & A <14y.o.        Family History   Problem Relation Age of Onset     Hypertension Mother      Cerebrovascular Disease Mother         mentally affected     Cancer - colorectal Father         mets to liver  age76     Gastrointestinal Disease Brother         multilple colon polyps     Hypertension Brother      Gastrointestinal Disease Sister         reflux     Obesity Son      Allergies Daughter      Arthritis Brother      Melanoma No family hx of        Social History     Socioeconomic History     Marital status:      Spouse name: Not on file     Number of children: Not on file     Years of education: Not on file     Highest education level: Not on file   Occupational History     Not on file   Social Needs     Financial resource strain: Not on file     Food insecurity:     Worry: Not on file     Inability: Not on file     Transportation needs:     Medical: Not on file     Non-medical: Not on file   Tobacco Use     Smoking status: Never  Smoker     Smokeless tobacco: Never Used   Substance and Sexual Activity     Alcohol use: Yes     Comment: occasional social     Drug use: No     Sexual activity: Yes     Partners: Female   Lifestyle     Physical activity:     Days per week: Not on file     Minutes per session: Not on file     Stress: Not on file   Relationships     Social connections:     Talks on phone: Not on file     Gets together: Not on file     Attends Roman Catholic service: Not on file     Active member of club or organization: Not on file     Attends meetings of clubs or organizations: Not on file     Relationship status: Not on file     Intimate partner violence:     Fear of current or ex partner: Not on file     Emotionally abused: Not on file     Physically abused: Not on file     Forced sexual activity: Not on file   Other Topics Concern      Service No     Blood Transfusions No     Caffeine Concern Yes     Comment: 1-2 day     Occupational Exposure No     Hobby Hazards Yes     Comment: [employer]     Sleep Concern No     Stress Concern No     Weight Concern No     Special Diet Yes     Comment: Diabetic and low fat     Back Care No     Exercise Yes     Bike Helmet Not Asked     Seat Belt Yes     Self-Exams Yes     Comment: check blood sugars     Parent/sibling w/ CABG, MI or angioplasty before 65F 55M? Not Asked   Social History Narrative     Not on file       Outpatient Encounter Medications as of 7/15/2019   Medication Sig Dispense Refill     aspirin 81 MG tablet Take 81 mg by mouth daily       atorvastatin (LIPITOR) 40 MG tablet Take 1 tablet (40 mg) by mouth daily 90 tablet 3     glipiZIDE (GLUCOTROL XL) 10 MG 24 hr tablet Take 1 tablet (10 mg) by mouth daily 90 tablet 3     Ibuprofen (ADVIL) 200 MG capsule Take 400 mg by mouth 2 times daily as needed.       lisinopril-hydrochlorothiazide (PRINZIDE/ZESTORETIC) 20-12.5 MG tablet Take 1 tablet by mouth 2 times daily 180 tablet 3     metFORMIN (GLUCOPHAGE) 1000 MG tablet Take 1 tablet  (1,000 mg) by mouth 2 times daily (with meals) 180 tablet 3     sildenafil (VIAGRA) 100 MG cap/tab Take 1 tablet (100 mg) by mouth daily as needed for erectile dysfunction 3 tablet 11     vardenafil (LEVITRA) 20 MG tablet Take 1 tablet (20 mg) by mouth daily as needed for erectile dysfunction 5 tablet 11     No facility-administered encounter medications on file as of 7/15/2019.              Review Of Systems  Skin: As above  Eyes: negative  Ears/Nose/Throat: negative  Respiratory: No shortness of breath, dyspnea on exertion, cough, or hemoptysis  Cardiovascular: negative  Gastrointestinal: negative  Genitourinary: negative  Musculoskeletal: negative  Neurologic: negative  Psychiatric: negative  Hematologic/Lymphatic/Immunologic: negative  Endocrine: negative      O:   NAD, WDWN, Alert & Oriented, Mood & Affect wnl, Vitals stable   Here today alone   /85 (BP Location: Left arm, Patient Position: Sitting, Cuff Size: Adult Regular)   Pulse 76   SpO2 96%    General appearance normal   Vitals stable   Alert, oriented and in no acute distress     L neck stuck on appule   Face clear of actinic keratosis       Eyes: Conjunctivae/lids:Normal     ENT: Lips, buccal mucosa, tongue: normal    MSK:Normal    Cardiovascular: peripheral edema none    Pulm: Breathing Normal    Neuro/Psych: Orientation:Normal; Mood/Affect:Normal      A/P:  1. Actinic keratosis s/p pdt did well  2. Seborrheic keratosis   BENIGN LESIONS DISCUSSED WITH PATIENT:  I discussed the specifics of tumor, prognosis, and genetics of benign lesions.  I explained that treatment of these lesions would be purely cosmetic and not medically neccessary.  I discussed with patient different removal options including excision, cautery and /or laser.      Nature and genetics of benign skin lesions dicussed with patient.  Signs and Symptoms of skin cancer discussed with patient.  Patient encouraged to perform monthly skin exams.  UV precautions reviewed with  patient.  Skin care regimen reviewed with patient: Eliminate harsh soaps, i.e. Dial, zest, irsih spring; Mild soaps such as Cetaphil or Dove sensitive skin, avoid hot or cold showers, aggressive use of emollients including vanicream, cetaphil or cerave discussed with patient.    Risks of non-melanoma skin cancer discussed with patient   Return to clinic 6 months

## 2019-07-15 NOTE — NURSING NOTE
"Chief Complaint   Patient presents with     Dizziness       Initial /76 (BP Location: Right arm, Patient Position: Chair, Cuff Size: Adult Large)   Pulse 78   Temp 97.6  F (36.4  C) (Tympanic)   Resp 18   Ht 1.702 m (5' 7\")   Wt 96.6 kg (213 lb)   SpO2 97%   BMI 33.36 kg/m   Estimated body mass index is 33.36 kg/m  as calculated from the following:    Height as of this encounter: 1.702 m (5' 7\").    Weight as of this encounter: 96.6 kg (213 lb).    Patient presents to the clinic using No DME    Health Maintenance that is potentially due pending provider review:  NONE    Tori Merrill MA  3:47 PM 7/15/2019  .        "

## 2019-09-08 NOTE — PROGRESS NOTES
Chief Complaint   Patient presents with     Ent Problem     History of Present Illness   Cecilio Navarro is a 79 year old male who presents to me today for ear evaluation.  The patient reports hearing loss in both ears worse on the right-hand side for many years.  He feels like this is been slightly worse over the last few years.  He has a lot of trouble in the car when he is the  in his right ear is towards the passenger.  He also has trouble in loud, noisy environments or crowded situations.  The patient denies any problems with otalgia, otorrhea, aural fullness, bloody otorrhea, dizziness, tinnitus, vertigo symptoms.  No previous ear surgery.  He does have a history of significant noise exposure.  [place] and did fine with an open Tractor.  He also has a history of firearm use it was a left-handed shooter for many years.  In his mid to late 20s he switched to a right-handed shooter.  Sometime around then he started wearing hearing protection while using heavy machinery and also with firearms.     An audiogram and tympanogram were performed on 12/12/2016 and personally reviewed.  The audiogram showed normal sloping to severe sensorineural hearing loss bilaterally significantly worse on the right.  Pure-tone average was 37 dB on the right and 20 dB on the left.  Speech reception threshhold was 35 dB on the right and 15 dB on the left.  The patient had 58% word recognition on the right and 96% word recognition on the left.  The tympanograms showed normal type A tympanograms with normal middle ear pressures bilaterally.  There was no sign of eustachian tube dysfunction or middle ear effusion.        Past Medical History  Patient Active Problem List   Diagnosis     HTN, goal below 140/90     Impotence of Organic Origin     Testosterone deficiency     HYPERLIPIDEMIA LDL GOAL <100     BPH with obstruction/lower urinary tract symptoms     Hydrocele of testis     Advanced directives,  counseling/discussion     Morbid obesity (H)     Type 2 diabetes mellitus with diabetic nephropathy, without long-term current use of insulin (H)     Actinic keratoses     Current Medications     Current Outpatient Medications:      aspirin 81 MG tablet, Take 81 mg by mouth daily, Disp: , Rfl:      atorvastatin (LIPITOR) 40 MG tablet, Take 1 tablet (40 mg) by mouth daily, Disp: 90 tablet, Rfl: 3     glipiZIDE (GLUCOTROL XL) 10 MG 24 hr tablet, Take 1 tablet (10 mg) by mouth daily, Disp: 90 tablet, Rfl: 3     Ibuprofen (ADVIL) 200 MG capsule, Take 400 mg by mouth 2 times daily as needed., Disp: , Rfl:      lisinopril-hydrochlorothiazide (PRINZIDE/ZESTORETIC) 20-12.5 MG tablet, Take 1 tablet by mouth daily, Disp: 90 tablet, Rfl: 2     metFORMIN (GLUCOPHAGE) 1000 MG tablet, Take 1 tablet (1,000 mg) by mouth 2 times daily (with meals), Disp: 180 tablet, Rfl: 3     sildenafil (VIAGRA) 100 MG cap/tab, Take 1 tablet (100 mg) by mouth daily as needed for erectile dysfunction, Disp: 3 tablet, Rfl: 11     vardenafil (LEVITRA) 20 MG tablet, Take 1 tablet (20 mg) by mouth daily as needed for erectile dysfunction, Disp: 5 tablet, Rfl: 11    Allergies  Allergies   Allergen Reactions     Amoxicillin Itching and Rash       Social History   Social History     Socioeconomic History     Marital status:      Spouse name: Not on file     Number of children: Not on file     Years of education: Not on file     Highest education level: Not on file   Occupational History     Not on file   Social Needs     Financial resource strain: Not on file     Food insecurity:     Worry: Not on file     Inability: Not on file     Transportation needs:     Medical: Not on file     Non-medical: Not on file   Tobacco Use     Smoking status: Never Smoker     Smokeless tobacco: Never Used   Substance and Sexual Activity     Alcohol use: Yes     Comment: occasional social     Drug use: No     Sexual activity: Yes     Partners: Female   Lifestyle      "Physical activity:     Days per week: Not on file     Minutes per session: Not on file     Stress: Not on file   Relationships     Social connections:     Talks on phone: Not on file     Gets together: Not on file     Attends Caodaism service: Not on file     Active member of club or organization: Not on file     Attends meetings of clubs or organizations: Not on file     Relationship status: Not on file     Intimate partner violence:     Fear of current or ex partner: Not on file     Emotionally abused: Not on file     Physically abused: Not on file     Forced sexual activity: Not on file   Other Topics Concern      Service No     Blood Transfusions No     Caffeine Concern Yes     Comment: 1-2 day     Occupational Exposure No     Hobby Hazards Yes     Comment: [employer]     Sleep Concern No     Stress Concern No     Weight Concern No     Special Diet Yes     Comment: Diabetic and low fat     Back Care No     Exercise Yes     Bike Helmet Not Asked     Seat Belt Yes     Self-Exams Yes     Comment: check blood sugars     Parent/sibling w/ CABG, MI or angioplasty before 65F 55M? Not Asked   Social History Narrative     Not on file       Family History  Family History   Problem Relation Age of Onset     Hypertension Mother      Cerebrovascular Disease Mother         mentally affected     Cancer - colorectal Father         mets to liver  age76     Gastrointestinal Disease Brother         multilple colon polyps     Hypertension Brother      Gastrointestinal Disease Sister         reflux     Obesity Son      Allergies Daughter      Arthritis Brother      Melanoma No family hx of        Review of Systems  As per HPI and PMHx, otherwise 10+ comprehensive system review is negative.    Physical Exam  BP (!) 160/94 (BP Location: Left arm, Patient Position: Sitting, Cuff Size: Adult Regular)   Pulse 74   Temp 98.1  F (36.7  C) (Oral)   Ht 1.702 m (5' 7\")   Wt 96.6 kg (213 lb)   BMI 33.36 kg/m    GENERAL: Patient is a " pleasant, cooperative 79 year old male in no acute distress.  HEAD: Normocephalic, atraumatic.  Hair and scalp are normal.  EYES: Pupils are equal, round, reactive to light and accommodation.  Extraocular movements are intact.  The sclera nonicteric without injection.  The extraocular structures are normal.  EARS: Normal shape and symmetry.  No tenderness when palpating the mastoid or tragal areas bilaterally.  Otoscopic exam reveals a mild amount of cerumen bilaterally.  The bilateral tympanic membranes are round, intact without evidence of effusion, good landmarks.  No retraction, granulation, or drainage.  NOSE: Nares are patent.  Nasal mucosa is pink and moist.  Negative anterior rhinoscopy.  NEUROLOGIC: Cranial nerves II through XII are grossly intact.  Voice is strong.  Patient is House-Brackman I/VI bilaterally.  CARDIOVASCULAR: Extremities are warm and well-perfused.  No significant peripheral edema.  RESPIRATORY: Patient has nonlabored breathing without cough, wheeze, stridor.  PSYCHIATRIC: Patient is alert and oriented.  Mood and affect appear normal.  SKIN: Warm and dry.  No scalp, face, or neck lesions noted.    Audiogram  An audiogram and tympanogram were performed today and personally reviewed.  The audiogram showed normal sloping to moderate sloping to severe sensorineural hearing loss in the right ear to mild sloping to severe sensorineural hearing loss in the left ear..  Pure-tone average is 39 dB on the right and 29 dB on the left.  Speech reception threshhold is 20 dB on the right and 10 dB on the left.  The patient had 72% word recognition on the right and 88% word recognition on the left.  The sensorineural hearing was age-appropriate, the patient had some asymmetry from 1000 3000 Hz.  There was no evidence of conductive hearing loss.  The tympanograms show normal type A tympanograms with normal middle ear pressures bilaterally.  There is no sign of eustachian tube dysfunction or middle ear  effusion.      Assessment and Plan     ICD-10-CM    1. Sensorineural hearing loss, bilateral H90.3 AUDIOLOGY ADULT REFERRAL   2. Asymmetrical right sensorineural hearing loss H90.41 AUDIOLOGY ADULT REFERRAL     It was my pleasure seeing Cecilio Navarro today in clinic.  The patient presents today with hearing loss.  The patient has had some nausea exposure over his lifetime and was a left-handed shooter for many years which likely explains the right asymmetric hearing loss.  I did discuss the patient that per the AAO guidelines, we do discuss MRI for asymmetry of greater than 10 dB across 3 frequencies.  After some discussion and the risks and benefits of imaging were discussed with the patient, he would like to defer imaging at this time.  From a clinical standpoint, there is no evidence of serious CNS disorders or other complicating factors that could be causing his hearing loss or asymmetry.  We spent the remainder of today's visit on education. We discussed hearing protection in noisy environments.    The patient will follow up as necessary for worsening symptoms or changes in symptoms. I have also recommended yearly audiograms, and consideration of a hearing aid evaluation.    Cecilio to follow up with Primary Care provider regarding elevated blood pressure.    Rj Yates MD  Department of Otolarygology-Head and Neck Surgery  [facility]

## 2019-09-10 ENCOUNTER — OFFICE VISIT (OUTPATIENT)
Dept: OTOLARYNGOLOGY | Facility: CLINIC | Age: 79
End: 2019-09-10
Payer: COMMERCIAL

## 2019-09-10 ENCOUNTER — OFFICE VISIT (OUTPATIENT)
Dept: AUDIOLOGY | Facility: CLINIC | Age: 79
End: 2019-09-10
Payer: COMMERCIAL

## 2019-09-10 VITALS
WEIGHT: 213 LBS | BODY MASS INDEX: 33.43 KG/M2 | HEART RATE: 74 BPM | DIASTOLIC BLOOD PRESSURE: 94 MMHG | HEIGHT: 67 IN | SYSTOLIC BLOOD PRESSURE: 160 MMHG | TEMPERATURE: 98.1 F

## 2019-09-10 DIAGNOSIS — H90.3 SENSORINEURAL HEARING LOSS, ASYMMETRICAL: Primary | ICD-10-CM

## 2019-09-10 DIAGNOSIS — H90.3 SENSORINEURAL HEARING LOSS, BILATERAL: Primary | ICD-10-CM

## 2019-09-10 PROCEDURE — 99203 OFFICE O/P NEW LOW 30 MIN: CPT | Performed by: OTOLARYNGOLOGY

## 2019-09-10 PROCEDURE — 92557 COMPREHENSIVE HEARING TEST: CPT | Performed by: AUDIOLOGIST

## 2019-09-10 PROCEDURE — 92550 TYMPANOMETRY & REFLEX THRESH: CPT | Performed by: AUDIOLOGIST

## 2019-09-10 PROCEDURE — 99207 ZZC NO CHARGE LOS: CPT | Performed by: AUDIOLOGIST

## 2019-09-10 ASSESSMENT — MIFFLIN-ST. JEOR: SCORE: 1649.79

## 2019-09-10 NOTE — PROGRESS NOTES
AUDIOLOGY REPORT    SUBJECTIVE:  Cecilio Navarro is a 79 year old male who was seen in the Audiology Clinic at [facility] for an audiologic evaluation, referred by Dr. Yates.  The patient has been seen previously in this clinic  for assessment and results indicated an asymmeterical normal sloping to severe sensorineural hearing loss bilaterally. The patient reports greater difficulty hearing in noise and in the car. The patient denies bilateral tinnitus and bilateral otalgia.     OBJECTIVE:  Abuse Screening:  Do you feel unsafe at home or work/school? No   Do you feel threatened by someone? No   Does anyone try to keep you from having contact with others, or doing things outside of your home? No   Physical signs of abuse present? No      Otoscopic exam indicates ears are clear of cerumen bilaterally       Pure Tone Thresholds assessed using conventional audiometry with good  reliability from 250-8000 Hz bilaterally using insert earphones and circumaural headphones     RIGHT:  normal, moderate-severe, severe and profound sensorineural hearing loss    LEFT:    normal, mild, moderate and severe sensorineural hearing loss    Tympanogram:    RIGHT: normal eardrum mobility ,1000 Hz ipsi/contra reflexes present     LEFT:   normal eardrum mobility ,1000 Hz ipsi/contra reflexes present     Speech Reception Threshold:    RIGHT: 20 dB HL    LEFT:   10 dB HL  Word Recognition Score:     RIGHT: 72% at 80 dB HL using NU-6 recorded word list.    LEFT:   88% at 60 dB HL using NU-6 recorded word list.      ASSESSMENT:   Normal hearing through 1000 Hz in the right ear and 2000 Hz in the left ear sloping    Compared to patient's previous audiogram dated 12/12/2016, hearing has improved 10-15 dB in the right ear from 6196-4946 Hz.. Today s results were discussed with the patient and his wifein detail.     PLAN: It is recommended that the patient be seen by Dr. Yates for medical evaluation of their ears and hearing  evaluation. Patient was counseled regarding hearing loss and impact on communication. Patient is a good candidate for amplification at this time.A [facility] information packet was given to the patient.Please call this clinic with questions regarding these results or recommendations.        Tamika Malave M.A. -AAA  Clinical audiologist [place] # [id number]  9/10/2019

## 2019-09-10 NOTE — NURSING NOTE
"Initial BP (!) 159/93 (BP Location: Left arm, Patient Position: Sitting, Cuff Size: Adult Regular)   Pulse 74   Temp 98.1  F (36.7  C) (Oral)   Ht 1.702 m (5' 7\")   Wt 96.6 kg (213 lb)   BMI 33.36 kg/m   Estimated body mass index is 33.36 kg/m  as calculated from the following:    Height as of this encounter: 1.702 m (5' 7\").    Weight as of this encounter: 96.6 kg (213 lb). .    Bertha Salcedo MA    "

## 2019-09-10 NOTE — PATIENT INSTRUCTIONS
Per physician instructions      If you have questions or concerns on any instructions given to you by your provider today or if you need to schedule an appointment, you can reach us at [phone].

## 2019-09-10 NOTE — NURSING NOTE
"Initial BP (!) 160/94 (BP Location: Left arm, Patient Position: Sitting, Cuff Size: Adult Regular)   Pulse 74   Temp 98.1  F (36.7  C) (Oral)   Ht 1.702 m (5' 7\")   Wt 96.6 kg (213 lb)   BMI 33.36 kg/m   Estimated body mass index is 33.36 kg/m  as calculated from the following:    Height as of this encounter: 1.702 m (5' 7\").    Weight as of this encounter: 96.6 kg (213 lb). .    Marguerite Jon CMA    "

## 2019-09-23 ENCOUNTER — OFFICE VISIT (OUTPATIENT)
Dept: AUDIOLOGY | Facility: CLINIC | Age: 79
End: 2019-09-23
Payer: COMMERCIAL

## 2019-09-23 DIAGNOSIS — H90.3 SENSORINEURAL HEARING LOSS, ASYMMETRICAL: Primary | ICD-10-CM

## 2019-09-23 PROCEDURE — 92591 HC HEARING AID EXAM BINAURAL: CPT | Performed by: AUDIOLOGIST

## 2019-09-24 ENCOUNTER — TRANSFERRED RECORDS (OUTPATIENT)
Dept: HEALTH INFORMATION MANAGEMENT | Facility: CLINIC | Age: 79
End: 2019-09-24

## 2019-09-25 NOTE — PROGRESS NOTES
Received Diabetic eye exam consultation form from [facility]    Diabetic retinopathy in right eye  Done on 9/24/2019    Form sent to scanning and eye exam order abstracted

## 2019-10-08 ENCOUNTER — OFFICE VISIT (OUTPATIENT)
Dept: AUDIOLOGY | Facility: CLINIC | Age: 79
End: 2019-10-08
Payer: COMMERCIAL

## 2019-10-08 DIAGNOSIS — H90.3 SENSORINEURAL HEARING LOSS, ASYMMETRICAL: Primary | ICD-10-CM

## 2019-10-08 PROCEDURE — V5267 HEARING AID SUP/ACCESS/DEV: HCPCS | Performed by: AUDIOLOGIST

## 2019-10-08 PROCEDURE — 99207 ZZC NO CHARGE LOS: CPT | Performed by: AUDIOLOGIST

## 2019-10-08 PROCEDURE — V5160 DISPENSING FEE BINAURAL: HCPCS | Performed by: AUDIOLOGIST

## 2019-10-08 PROCEDURE — V5020 CONFORMITY EVALUATION: HCPCS | Mod: RT | Performed by: AUDIOLOGIST

## 2019-10-08 PROCEDURE — V5011 HEARING AID FITTING/CHECKING: HCPCS | Mod: RT | Performed by: AUDIOLOGIST

## 2019-10-08 PROCEDURE — V5261 HEARING AID, DIGIT, BIN, BTE: HCPCS | Performed by: AUDIOLOGIST

## 2019-10-08 PROCEDURE — 92593 HC HEARING AID CHECK, BINAURAL: CPT | Performed by: AUDIOLOGIST

## 2019-10-08 NOTE — PROGRESS NOTES
AUDIOLOGY REPORT    SUBJECTIVE: Cecilio Navarro, a 79 year old male, was seen in the Audiology Clinic at [facility] today for a Binaural hearing aid fitting. Previous results have revealed a bilateral normal sloping to severe asymmetrical sensorineural hearing loss. The patient was given medical clearance to pursue amplification by  Rj Yates MD..      OBJECTIVE:  Prior to fitting, a hearing aid check was performed to ensure device functionality. The hearing aid conformity evaluation was completed.The hearing aids were placed and they provided a good fit. Real-ear-probe-microphone measurements were completed on the [employer] system and were a good match to NAL-NL2 target with soft sounds audible, moderate sounds comfortable, and loud sounds below discomfort. UCLs are verified through maximum power output measures and demonstrate appropriate limiting of loud inputs. Mr. Navarro was oriented to proper hearing aid use, care, cleaning (no water, dry brush), batteries (rechargable, low-battery signal), aid insertion/removal, user booklet, warranty information, storage cases, and other hearing aid details. The patient confirmed understanding of hearing aid use and care, and showed proper insertion of hearing aid and batteries while in the office today. Mr. Navarro reported good volume and sound quality today.    EAR(S) FIT:    MA HEARING AID MAKE: Right: Phonak; Left: Phonak  MA HEARING AID MODEL #: Right: Audeo M50-R; Left: Audeo M50-R  HEARING AID STYLE: Right: BTE; Left: BTE  DOME SIZE: Right:  medium open; Left::  medium open   LENGTH: Right:  #1 M; Left:  #1 M  SERIAL NUMBERS: Right: [id number]; Left: [id number]  WARRANTY END DATE: Right: 12/24/2021; Left:: 12/24/2021      CHARGES:   Hearing Aid Check: Binaural, 51329, $81.00  Dispensing Fee: Binaural, , $500.00,  Fit/Orientation: Binaural, , $370.00  Hearing Aid Conformity Evaluation: , Qty:2  Hearing Aid Digital:  Binaural, BTE, .,NU (Binaural)   Hearing Aid Accessory: Hearing aid : $200.00  Total: $3800.00         ASSESSMENT: Binaural hearing aid fitting completed today. Verification measures were performed. The 45 day trial period was explained to patient, and they expressed understanding. Mr. Navarro signed the Hearing Aid Purchase Agreement and was given a copy, as well as details on his hearing aids. Patient was counseled that exact out of pocket amounts cannot be determined for hearing aid claims being sent to insurance. Any insurance coverage information presented to the patient is an estimate only, and is not a guarantee of payment. Patient has been advised to check with their own insurance.    PLAN: Mr. Navarro will return for follow-up in 2-3 weeks for a hearing aid review appointment. Please call this clinic with questions regarding today s appointment.    Tamika Malave M.A. -[facility], #[id number]

## 2019-10-10 ENCOUNTER — IMMUNIZATION (OUTPATIENT)
Dept: FAMILY MEDICINE | Facility: CLINIC | Age: 79
End: 2019-10-10
Payer: COMMERCIAL

## 2019-10-10 DIAGNOSIS — Z23 NEED FOR PROPHYLACTIC VACCINATION AND INOCULATION AGAINST INFLUENZA: Primary | ICD-10-CM

## 2019-10-10 PROCEDURE — 90662 IIV NO PRSV INCREASED AG IM: CPT

## 2019-10-10 PROCEDURE — G0008 ADMIN INFLUENZA VIRUS VAC: HCPCS

## 2019-10-10 PROCEDURE — 99207 ZZC NO CHARGE NURSE ONLY: CPT

## 2019-10-28 ENCOUNTER — OFFICE VISIT (OUTPATIENT)
Dept: AUDIOLOGY | Facility: CLINIC | Age: 79
End: 2019-10-28
Payer: COMMERCIAL

## 2019-10-28 DIAGNOSIS — H90.3 SENSORINEURAL HEARING LOSS, ASYMMETRICAL: Primary | ICD-10-CM

## 2019-10-28 PROCEDURE — V5299 HEARING SERVICE: HCPCS | Performed by: AUDIOLOGIST

## 2019-10-28 PROCEDURE — 99207 ZZC NO CHARGE LOS: CPT | Performed by: AUDIOLOGIST

## 2019-10-28 NOTE — PROGRESS NOTES
AUDIOLOGY REPORT    SUBJECTIVE:Cecilio Navarro is a 79 year old male who was seen in the Audiology Clinic at [facility] on 10/28/2019  for a follow-up check regarding the fitting of new hearing aids. Previous results have revealed a normal sloping to severe asymmetrical sensorineural hearing loss bialterally.  The patient has been seen previously in this clinic and was fit with Phonak [employer] M50-R hearing aids  on 10/8/2019.  Cecilio reports good sound quality with the hearing aid(s) and increased wear time with the heaing aids. He feels they are helpful in groups.    OBJECTIVE:   The International Outcome Inventory-Hearing Aids (IOI-HA) was administered today.The patient s responses to the 7 questions can be compared to normative data relative to how others are performing with their hearing aids, as well as focusing audiologic care and counseling.This patient s Quality of Life score (Question 7) was 4, which is above normative average.     Based on patient report, the following changes were made;none.    Reviewed 45 day trial period, care, cleaning (no water, dry brush), batteries (recharable) insertion/removal, toxicity, low-battery signal), aid insertion/removal, volume adjustment (if applicable), user booklet, warranty information, storage cases, and other hearing aid details.       No charge visit today (in warranty hearing aid check).     ASSESSMENT: A follow-up appointment for hearing aid fitting was completed today. IOI-HA administered today. Changes to hearing aid was completed as outlined above.     PLAN:Cecilio will return for follow-up as needed, or at least every 9-12 months for cleaning and assessment of hearing aid.  . Please call this clinic with any questions regarding today s appointment.    Tamika RENEE, #[id number]

## 2019-11-07 ENCOUNTER — OFFICE VISIT (OUTPATIENT)
Dept: FAMILY MEDICINE | Facility: CLINIC | Age: 79
End: 2019-11-07
Payer: COMMERCIAL

## 2019-11-07 ENCOUNTER — ALLIED HEALTH/NURSE VISIT (OUTPATIENT)
Dept: FAMILY MEDICINE | Facility: CLINIC | Age: 79
End: 2019-11-07
Payer: COMMERCIAL

## 2019-11-07 VITALS — HEART RATE: 96 BPM | DIASTOLIC BLOOD PRESSURE: 80 MMHG | SYSTOLIC BLOOD PRESSURE: 144 MMHG

## 2019-11-07 VITALS
RESPIRATION RATE: 18 BRPM | TEMPERATURE: 98.7 F | OXYGEN SATURATION: 98 % | HEART RATE: 96 BPM | WEIGHT: 209.4 LBS | SYSTOLIC BLOOD PRESSURE: 144 MMHG | DIASTOLIC BLOOD PRESSURE: 80 MMHG | HEIGHT: 67 IN | BODY MASS INDEX: 32.87 KG/M2

## 2019-11-07 DIAGNOSIS — I10 HTN, GOAL BELOW 140/90: Primary | ICD-10-CM

## 2019-11-07 DIAGNOSIS — R42 LIGHTHEADEDNESS: Primary | ICD-10-CM

## 2019-11-07 DIAGNOSIS — E11.21 TYPE 2 DIABETES MELLITUS WITH DIABETIC NEPHROPATHY, WITHOUT LONG-TERM CURRENT USE OF INSULIN (H): ICD-10-CM

## 2019-11-07 DIAGNOSIS — I10 HTN, GOAL BELOW 140/90: ICD-10-CM

## 2019-11-07 DIAGNOSIS — I45.10 RIGHT BUNDLE BRANCH BLOCK: ICD-10-CM

## 2019-11-07 LAB
ANION GAP SERPL CALCULATED.3IONS-SCNC: 5 MMOL/L (ref 3–14)
BUN SERPL-MCNC: 14 MG/DL (ref 7–30)
CALCIUM SERPL-MCNC: 9.2 MG/DL (ref 8.5–10.1)
CHLORIDE SERPL-SCNC: 100 MMOL/L (ref 94–109)
CO2 SERPL-SCNC: 32 MMOL/L (ref 20–32)
CREAT SERPL-MCNC: 0.9 MG/DL (ref 0.66–1.25)
ERYTHROCYTE [DISTWIDTH] IN BLOOD BY AUTOMATED COUNT: 12.8 % (ref 10–15)
GFR SERPL CREATININE-BSD FRML MDRD: 82 ML/MIN/{1.73_M2}
GLUCOSE SERPL-MCNC: 194 MG/DL (ref 70–99)
HBA1C MFR BLD: 7.6 % (ref 0–5.6)
HCT VFR BLD AUTO: 44.4 % (ref 40–53)
HGB BLD-MCNC: 14.9 G/DL (ref 13.3–17.7)
MCH RBC QN AUTO: 31.2 PG (ref 26.5–33)
MCHC RBC AUTO-ENTMCNC: 33.6 G/DL (ref 31.5–36.5)
MCV RBC AUTO: 93 FL (ref 78–100)
PLATELET # BLD AUTO: 251 10E9/L (ref 150–450)
POTASSIUM SERPL-SCNC: 3.4 MMOL/L (ref 3.4–5.3)
RBC # BLD AUTO: 4.77 10E12/L (ref 4.4–5.9)
SODIUM SERPL-SCNC: 137 MMOL/L (ref 133–144)
WBC # BLD AUTO: 9.5 10E9/L (ref 4–11)

## 2019-11-07 PROCEDURE — 80048 BASIC METABOLIC PNL TOTAL CA: CPT | Performed by: NURSE PRACTITIONER

## 2019-11-07 PROCEDURE — 93000 ELECTROCARDIOGRAM COMPLETE: CPT | Performed by: NURSE PRACTITIONER

## 2019-11-07 PROCEDURE — 99207 ZZC NO CHARGE NURSE ONLY: CPT

## 2019-11-07 PROCEDURE — 36415 COLL VENOUS BLD VENIPUNCTURE: CPT | Performed by: NURSE PRACTITIONER

## 2019-11-07 PROCEDURE — 85027 COMPLETE CBC AUTOMATED: CPT | Performed by: NURSE PRACTITIONER

## 2019-11-07 PROCEDURE — 83036 HEMOGLOBIN GLYCOSYLATED A1C: CPT | Performed by: NURSE PRACTITIONER

## 2019-11-07 PROCEDURE — 99214 OFFICE O/P EST MOD 30 MIN: CPT | Performed by: NURSE PRACTITIONER

## 2019-11-07 RX ORDER — LISINOPRIL 40 MG/1
40 TABLET ORAL DAILY
Qty: 30 TABLET | Refills: 0 | Status: SHIPPED | OUTPATIENT
Start: 2019-11-07 | End: 2019-11-21

## 2019-11-07 RX ORDER — HYDROCHLOROTHIAZIDE 12.5 MG/1
12.5 TABLET ORAL DAILY
Qty: 30 TABLET | Refills: 0 | Status: SHIPPED | OUTPATIENT
Start: 2019-11-07 | End: 2019-11-21

## 2019-11-07 ASSESSMENT — MIFFLIN-ST. JEOR: SCORE: 1633.46

## 2019-11-07 NOTE — PROGRESS NOTES
Cecilio Navarro is a 79 year old year old patient who comes in today for a Blood Pressure check because of lightheaded episode lasting approx 1 hr this AM, intermittent headaches base of head/ neck.  BP Readings from Last 6 Encounters:   11/07/19 (!) 144/80   11/07/19 (!) 144/80   09/10/19 (!) 160/94   07/15/19 118/76   07/15/19 135/85   06/24/19 128/70     HR 96, slight  irreg  Patient is taking medication as prescribed- lisinopril- hydrochlorothiazide reduced to 20-12.5 mg per 07-15-19 OV.   Patient is tolerating medications well.  Patient is not monitoring Blood Pressure at home.    Current complaints: headaches and light-headedness  Disposition:  Advised to see provider today, scheduled with YUSUF Rogers.  LEANN Kohler RN

## 2019-11-07 NOTE — PATIENT INSTRUCTIONS
-Echocardiogram and Holter monitor; call [phone] to schedule  -Hydrochlorothiazide 12.5mg once daily & lisinopril 40mg once daily beginning tomorrow  -Follow-up appointment in 2 weeks  -Due for 1st of 2 shingles vaccinations; check with the pharmacy as it is better covered with insurance there.    Patient Education     Zoster Vaccine, Recombinant injection  Brand Name: SHINGRIX  What is this medicine?  ZOSTER VACCINE (ZOS ter vak SEEN) is used to prevent shingles in adults 50 years old and over. This vaccine is not used to treat shingles or nerve pain from shingles.  How should I use this medicine?  This vaccine is for injection in a muscle. It is given by a health care professional.  Talk to your pediatrician regarding the use of this medicine in children. This medicine is not approved for use in children.  What side effects may I notice from receiving this medicine?  Side effects that you should report to your doctor or health care professional as soon as possible:    allergic reactions like skin rash, itching or hives, swelling of the face, lips, or tongue    breathing problems  Side effects that usually do not require medical attention (report these to your doctor or health care professional if they continue or are bothersome):    chills    headache    fever    nausea, vomiting    redness, warmth, pain, swelling or itching at site where injected    tiredness  What may interact with this medicine?      medicines that suppress your immune system    medicines to treat cancer    steroid medicines like prednisone or cortisone  What if I miss a dose?  Keep appointments for follow-up (booster) doses as directed. It is important not to miss your dose. Call your doctor or health care professional if you are unable to keep an appointment.  Where should I keep my medicine?  This vaccine is only given in a clinic, pharmacy, doctor's office, or other health care setting and will not be stored at home.  What should I  tell my health care provider before I take this medicine?  They need to know if you have any of these conditions:    blood disorders or disease    cancer like leukemia or lymphoma    immune system problems or therapy    an unusual or allergic reaction to vaccines, other medications, foods, dyes, or preservatives    pregnant or trying to get pregnant    breast-feeding  What should I watch for while using this medicine?  Visit your doctor for regular check ups.  This vaccine, like all vaccines, may not fully protect everyone.  NOTE:This sheet is a summary. It may not cover all possible information. If you have questions about this medicine, talk to your doctor, pharmacist, or health care provider. Copyright  2019 [employer]

## 2019-11-07 NOTE — PROGRESS NOTES
"SUBJECTIVE   Cecilio Navarro is a 79 year old male who presents with     Hypertension Follow-up      Do you check your blood pressure regularly outside of the clinic? No     Are you following a low salt diet? Yes    Are your blood pressures ever more than 140 on the top number (systolic) OR more   than 90 on the bottom number (diastolic), for example 140/90? Yes    Cecilio presented today for an ancillary blood pressure check because he felt \"dizzy\".  He reports feeling similar to this in July; he saw his PCP at the time and was found to be hypotensive.  His lisinopril-hydrochlorothiazide dose was reduced at that time and he has felt fine up until today.  Cecilio also endorses a occipital headache for the last several weeks that improves with tylenol.  He was ill about a week ago at which point he had nausea, abdominal pain, and a few episodes of loose stools.  His wife is sick with similar symptoms currently.    PCP   Damion Morales -[phone]    Health Maintenance        Health Maintenance Due   Topic Date Due     ZOSTER IMMUNIZATION (1 of 2) 07/27/1992     DIABETIC FOOT EXAM  12/16/2014     DTAP/TDAP/TD IMMUNIZATION (3 - Td) 10/01/2017     ADVANCE CARE PLANNING  02/28/2019     LIPID  04/24/2019     A1C  11/20/2019       HPI        Patient Active Problem List   Diagnosis     HTN, goal below 140/90     Impotence of Organic Origin     Testosterone deficiency     HYPERLIPIDEMIA LDL GOAL <100     BPH with obstruction/lower urinary tract symptoms     Hydrocele of testis     Advanced directives, counseling/discussion     Morbid obesity (H)     Type 2 diabetes mellitus with diabetic nephropathy, without long-term current use of insulin (H)     Actinic keratoses     Current Outpatient Medications   Medication     aspirin 81 MG tablet     atorvastatin (LIPITOR) 40 MG tablet     glipiZIDE (GLUCOTROL XL) 10 MG 24 hr tablet     Ibuprofen (ADVIL) 200 MG capsule     lisinopril-hydrochlorothiazide (PRINZIDE/ZESTORETIC) " "20-12.5 MG tablet     metFORMIN (GLUCOPHAGE) 1000 MG tablet     sildenafil (VIAGRA) 100 MG cap/tab     vardenafil (LEVITRA) 20 MG tablet     No current facility-administered medications for this visit.      Reviewed and updated:  Tobacco  Allergies  Meds  Med Hx  Surg Hx  Fam Hx  Soc Hx     ROS:  Constitutional, neuro, ENT, endocrine, pulmonary, cardiac, gastrointestinal, genitourinary, musculoskeletal, integument and psychiatric systems are negative, except as otherwise noted.    PHYSICAL EXAM   BP (!) 144/80 (Cuff Size: Adult Regular)   Pulse 96   Temp 98.7  F (37.1  C) (Tympanic)   Resp 18   Ht 1.702 m (5' 7\")   Wt 95 kg (209 lb 6.4 oz)   SpO2 98%   BMI 32.80 kg/m    Body mass index is 32.8 kg/m .  GENERAL: healthy, alert and no distress  EYES: Eyes grossly normal to inspection, PERRL and conjunctivae and sclerae normal.  No abnormalities on fundoscopic exam.  NECK: no adenopathy, no asymmetry, masses, or scars and thyroid normal to palpation  RESP: lungs clear to auscultation - no rales, rhonchi or wheezes  CV: regular rate and rhythm, normal S1 S2, no S3 or S4, no murmur, click or rub, no peripheral edema 3+ radial pulses bilaterally  ABDOMEN: soft, nontender, no hepatosplenomegaly, no masses and bowel sounds normal  MS: no gross musculoskeletal defects noted, no edema  NEURO: Normal strength and tone, mentation intact and speech normal  PSYCH: mentation appears normal, affect normal/bright    Assessment & Plan   1. Lightheadedness  Migel has been experiencing lightheadedness today, which prompted him to have his blood pressure checked in clinic.  BP was 144/80 at time of presentation.  ECG today showed RBBB; there is no baseline to compare to.  RBBB could be etiology of lightheadedness, although unlikely given symptom's acute nature.  Lightheadedness could be secondary to elevated BP.  Less likely due to electrolyte abnormality but cannot rule out at this time; will do lab work today.  Least likely " "vasovagal or orthostatic hypotension as Migel notes no triggers or changes in dizziness with position changes.  Recommend Migel has ECHO and Holter to evaluate new RBBB.  - EKG 12-lead complete w/read - Clinics  - Holter monitor  - ECHO  - Basic metabolic panel  - CBC with platelets    2. HTN, goal below 140/90  Migel has a history of essential HTN for which he is currently on lisinopril-hydrochlorothiazide 20-12.5 mg once daily.  His dose was decreased from 40-25mg in July when he was experiencing dizziness and was found to be hypotensive.  Migel does not check his BP at home on a regular basis.  - Basic metabolic panel  - Follow-up in 2 weeks for BP recheck    3. Type 2 diabetes mellitus with diabetic nephropathy, without long-term current use of insulin (H)  Migel has a history of T2DM on metformin and glipizide.  A1c today is 7.6% down from 7.9% three months ago.  - Hemoglobin A1c     BMI:   Estimated body mass index is 32.8 kg/m  as calculated from the following:    Height as of this encounter: 1.702 m (5' 7\").    Weight as of this encounter: 95 kg (209 lb 6.4 oz).   Weight management plan: Discussed healthy diet and exercise guidelines      Return in about 2 weeks (around 11/21/2019) for BP and symptom recheck.    Regine Holley, MS3    Risks, benefits, side effects and rationale for treatment plan fully discussed with the patient and understanding expressed.    Subjective     Cecilio Navarro is a 79 year old male who presents to clinic today for the following health issues:    HPI   Cecilio Navarro is a 79 year old male who presents with     Hypertension Follow-up      Do you check your blood pressure regularly outside of the clinic? No     Are you following a low salt diet? Yes    Are your blood pressures ever more than 140 on the top number (systolic) OR more   than 90 on the bottom number (diastolic), for example 140/90? Yes    Cecilio presented today for an ancillary blood pressure check because he felt " "\"dizzy\".  He reports feeling similar to this in July; he saw his PCP at the time and was found to be hypotensive.  His lisinopril-hydrochlorothiazide dose was reduced at that time and he has felt fine up until today.  Cecilio also endorses a occipital headache for the last several weeks that improves with tylenol.  He was ill about a week ago at which point he had nausea, abdominal pain, and a few episodes of loose stools.  His wife is sick with similar symptoms currently.          Reviewed and updated as needed this visit by Provider         Review of Systems   ROS COMP: Constitutional, HEENT, cardiovascular, pulmonary, gi and gu systems are negative, except as otherwise noted.      Objective    BP (!) 144/80 (Cuff Size: Adult Regular)   Pulse 96   Temp 98.7  F (37.1  C) (Tympanic)   Resp 18   Ht 1.702 m (5' 7\")   Wt 95 kg (209 lb 6.4 oz)   SpO2 98%   BMI 32.80 kg/m    Body mass index is 32.8 kg/m .  Physical Exam   GENERAL: healthy, alert and no distress  NECK: no adenopathy, no asymmetry, masses, or scars and thyroid normal to palpation  RESP: lungs clear to auscultation - no rales, rhonchi or wheezes  CV: regular rate and rhythm, normal S1 S2, no S3 or S4, no murmur, click or rub, no peripheral edema and peripheral pulses strong  ABDOMEN: soft, nontender, no hepatosplenomegaly, no masses and bowel sounds normal  MS: no gross musculoskeletal defects noted, no edema    Diagnostic Test Results:  Labs reviewed in Epic  Results for orders placed or performed in visit on 11/07/19   Basic metabolic panel     Status: Abnormal   Result Value Ref Range    Sodium 137 133 - 144 mmol/L    Potassium 3.4 3.4 - 5.3 mmol/L    Chloride 100 94 - 109 mmol/L    Carbon Dioxide 32 20 - 32 mmol/L    Anion Gap 5 3 - 14 mmol/L    Glucose 194 (H) 70 - 99 mg/dL    Urea Nitrogen 14 7 - 30 mg/dL    Creatinine 0.90 0.66 - 1.25 mg/dL    GFR Estimate 82 >60 mL/min/[1.73_m2]    GFR Estimate If Black >90 >60 mL/min/[1.73_m2]    Calcium 9.2 " 8.5 - 10.1 mg/dL   CBC with platelets     Status: None   Result Value Ref Range    WBC 9.5 4.0 - 11.0 10e9/L    RBC Count 4.77 4.4 - 5.9 10e12/L    Hemoglobin 14.9 13.3 - 17.7 g/dL    Hematocrit 44.4 40.0 - 53.0 %    MCV 93 78 - 100 fl    MCH 31.2 26.5 - 33.0 pg    MCHC 33.6 31.5 - 36.5 g/dL    RDW 12.8 10.0 - 15.0 %    Platelet Count 251 150 - 450 10e9/L   Hemoglobin A1c     Status: Abnormal   Result Value Ref Range    Hemoglobin A1C 7.6 (H) 0 - 5.6 %         EKG: RBB (no previous EKG for comparison)     Assessment & Plan     1. Lightheadedness  Unclear exact etiology.  Lab work up unremarkable  He had medication reduction on his lisinopril-hydrochlorothiazide on the past 6 months   He is hypertensive in clinic today   EKG with RBB- doubt related to symptoms but will get an echo for further evaluation   Also recommend a Holter monitor for evaluation of arrhythmias as cause of his symptoms     - EKG 12-lead complete w/read - Clinics  - Basic metabolic panel  - CBC with platelets  - Echocardiogram Complete; Future  - Zio Patch Holter Adult Pediatric Greater than 48 hrs; Future    2. HTN, goal below 140/90  Elevated today   Will increase his lisinopril to 40 mg and continue hydrochlorothiazide at 12.5 mg   Will have him follow up with his PCP in 2 weeks for a recheck   - Basic metabolic panel  - lisinopril (PRINIVIL/ZESTRIL) 40 MG tablet; Take 1 tablet (40 mg) by mouth daily  Dispense: 30 tablet; Refill: 0  - hydrochlorothiazide (HYDRODIURIL) 12.5 MG tablet; Take 1 tablet (12.5 mg) by mouth daily  Dispense: 30 tablet; Refill: 0    3. Type 2 diabetes mellitus with diabetic nephropathy, without long-term current use of insulin (H)  Stable   - Hemoglobin A1c    4. Right bundle branch block  - Echocardiogram Complete; Future         Patient Instructions   -Echocardiogram and Holter monitor; call [phone] to schedule  -Hydrochlorothiazide 12.5mg once daily & lisinopril 40mg once daily beginning tomorrow  -Follow-up  appointment in 2 weeks  -Due for 1st of 2 shingles vaccinations; check with the pharmacy as it is better covered with insurance there.    Patient Education     Zoster Vaccine, Recombinant injection  Brand Name: SHINGRIX  What is this medicine?  ZOSTER VACCINE (ZOS ter vak SEEN) is used to prevent shingles in adults 50 years old and over. This vaccine is not used to treat shingles or nerve pain from shingles.  How should I use this medicine?  This vaccine is for injection in a muscle. It is given by a health care professional.  Talk to your pediatrician regarding the use of this medicine in children. This medicine is not approved for use in children.  What side effects may I notice from receiving this medicine?  Side effects that you should report to your doctor or health care professional as soon as possible:    allergic reactions like skin rash, itching or hives, swelling of the face, lips, or tongue    breathing problems  Side effects that usually do not require medical attention (report these to your doctor or health care professional if they continue or are bothersome):    chills    headache    fever    nausea, vomiting    redness, warmth, pain, swelling or itching at site where injected    tiredness  What may interact with this medicine?      medicines that suppress your immune system    medicines to treat cancer    steroid medicines like prednisone or cortisone  What if I miss a dose?  Keep appointments for follow-up (booster) doses as directed. It is important not to miss your dose. Call your doctor or health care professional if you are unable to keep an appointment.  Where should I keep my medicine?  This vaccine is only given in a clinic, pharmacy, doctor's office, or other health care setting and will not be stored at home.  What should I tell my health care provider before I take this medicine?  They need to know if you have any of these conditions:    blood disorders or disease    cancer like leukemia  or lymphoma    immune system problems or therapy    an unusual or allergic reaction to vaccines, other medications, foods, dyes, or preservatives    pregnant or trying to get pregnant    breast-feeding  What should I watch for while using this medicine?  Visit your doctor for regular check ups.  This vaccine, like all vaccines, may not fully protect everyone.  NOTE:This sheet is a summary. It may not cover all possible information. If you have questions about this medicine, talk to your doctor, pharmacist, or health care provider. Copyright  2019 [employer]               Return in about 2 weeks (around 11/21/2019) for BP and symptom recheck.    Viri Del Real NP  [facility]

## 2019-11-07 NOTE — NURSING NOTE
"Chief Complaint   Patient presents with     Hypertension     Dizziness     BP (!) 144/80 (Cuff Size: Adult Regular)   Pulse 96   Temp 98.7  F (37.1  C) (Tympanic)   Resp 18   Ht 1.702 m (5' 7\")   Wt 95 kg (209 lb 6.4 oz)   SpO2 98%   BMI 32.80 kg/m   Estimated body mass index is 32.8 kg/m  as calculated from the following:    Height as of this encounter: 1.702 m (5' 7\").    Weight as of this encounter: 95 kg (209 lb 6.4 oz).  Patient presents to the clinic using No DME      Health Maintenance that is potentially due pending provider review:    Health Maintenance Due   Topic Date Due     ZOSTER IMMUNIZATION (1 of 2) 07/27/1992     DIABETIC FOOT EXAM  12/16/2014     DTAP/TDAP/TD IMMUNIZATION (3 - Td) 10/01/2017     ADVANCE CARE PLANNING  02/28/2019     LIPID  04/24/2019     A1C  11/20/2019                "

## 2019-11-07 NOTE — LETTER
November 11, 2019      Cecilio Navarro  [address] [place] [id number]        Dear ,    We are writing to inform you of your test results.    labs were all stable. A1C was 7.6.     Resulted Orders   Basic metabolic panel   Result Value Ref Range    Sodium 137 133 - 144 mmol/L    Potassium 3.4 3.4 - 5.3 mmol/L    Chloride 100 94 - 109 mmol/L    Carbon Dioxide 32 20 - 32 mmol/L    Anion Gap 5 3 - 14 mmol/L    Glucose 194 (H) 70 - 99 mg/dL      Comment:      Non Fasting    Urea Nitrogen 14 7 - 30 mg/dL    Creatinine 0.90 0.66 - 1.25 mg/dL    GFR Estimate 82 >60 mL/min/[1.73_m2]      Comment:      Non  GFR Calc  Starting 12/18/2018, serum creatinine based estimated GFR (eGFR) will be   calculated using the Chronic Kidney Disease Epidemiology Collaboration   (CKD-EPI) equation.      GFR Estimate If Black >90 >60 mL/min/[1.73_m2]      Comment:       GFR Calc  Starting 12/18/2018, serum creatinine based estimated GFR (eGFR) will be   calculated using the Chronic Kidney Disease Epidemiology Collaboration   (CKD-EPI) equation.      Calcium 9.2 8.5 - 10.1 mg/dL   CBC with platelets   Result Value Ref Range    WBC 9.5 4.0 - 11.0 10e9/L    RBC Count 4.77 4.4 - 5.9 10e12/L    Hemoglobin 14.9 13.3 - 17.7 g/dL    Hematocrit 44.4 40.0 - 53.0 %    MCV 93 78 - 100 fl    MCH 31.2 26.5 - 33.0 pg    MCHC 33.6 31.5 - 36.5 g/dL    RDW 12.8 10.0 - 15.0 %    Platelet Count 251 150 - 450 10e9/L   Hemoglobin A1c   Result Value Ref Range    Hemoglobin A1C 7.6 (H) 0 - 5.6 %      Comment:      Normal <5.7% Prediabetes 5.7-6.4%  Diabetes 6.5% or higher - adopted from ADA   consensus guidelines.         If you have any questions or concerns, please call the clinic at the number listed above.       Sincerely,        Viri Del Real NP/[facility]

## 2019-11-21 ENCOUNTER — HOSPITAL ENCOUNTER (OUTPATIENT)
Dept: CARDIOLOGY | Facility: CLINIC | Age: 79
Discharge: HOME | End: 2019-11-21
Attending: NURSE PRACTITIONER | Admitting: NURSE PRACTITIONER
Payer: MEDICARE

## 2019-11-21 ENCOUNTER — OFFICE VISIT (OUTPATIENT)
Dept: FAMILY MEDICINE | Facility: CLINIC | Age: 79
End: 2019-11-21
Payer: COMMERCIAL

## 2019-11-21 VITALS
RESPIRATION RATE: 14 BRPM | BODY MASS INDEX: 32.49 KG/M2 | DIASTOLIC BLOOD PRESSURE: 90 MMHG | HEART RATE: 68 BPM | SYSTOLIC BLOOD PRESSURE: 144 MMHG | OXYGEN SATURATION: 99 % | TEMPERATURE: 97.3 F | WEIGHT: 207 LBS | HEIGHT: 67 IN

## 2019-11-21 DIAGNOSIS — R42 LIGHTHEADEDNESS: ICD-10-CM

## 2019-11-21 DIAGNOSIS — I10 HTN, GOAL BELOW 140/90: ICD-10-CM

## 2019-11-21 DIAGNOSIS — E78.5 HYPERLIPIDEMIA LDL GOAL <100: ICD-10-CM

## 2019-11-21 DIAGNOSIS — I45.10 RIGHT BUNDLE BRANCH BLOCK: ICD-10-CM

## 2019-11-21 DIAGNOSIS — E11.21 TYPE 2 DIABETES MELLITUS WITH DIABETIC NEPHROPATHY, WITHOUT LONG-TERM CURRENT USE OF INSULIN (H): Primary | ICD-10-CM

## 2019-11-21 PROCEDURE — 99214 OFFICE O/P EST MOD 30 MIN: CPT | Performed by: FAMILY MEDICINE

## 2019-11-21 PROCEDURE — 0298T ZIO PATCH HOLTER ADULT PEDIATRIC GREATER THAN 48 HRS: CPT | Performed by: INTERNAL MEDICINE

## 2019-11-21 PROCEDURE — 93306 TTE W/DOPPLER COMPLETE: CPT

## 2019-11-21 PROCEDURE — 0296T ZIO PATCH HOLTER ADULT PEDIATRIC GREATER THAN 48 HRS: CPT

## 2019-11-21 PROCEDURE — 93306 TTE W/DOPPLER COMPLETE: CPT | Mod: 26 | Performed by: INTERNAL MEDICINE

## 2019-11-21 RX ORDER — GLIPIZIDE 10 MG/1
10 TABLET, FILM COATED, EXTENDED RELEASE ORAL DAILY
Qty: 90 TABLET | Refills: 3 | Status: SHIPPED | OUTPATIENT
Start: 2019-11-21 | End: 2020-09-16

## 2019-11-21 RX ORDER — LISINOPRIL 40 MG/1
40 TABLET ORAL DAILY
Qty: 90 TABLET | Refills: 3 | Status: SHIPPED | OUTPATIENT
Start: 2019-11-21 | End: 2020-09-16

## 2019-11-21 RX ORDER — HYDROCHLOROTHIAZIDE 25 MG/1
25 TABLET ORAL DAILY
Qty: 90 TABLET | Refills: 3 | Status: SHIPPED | OUTPATIENT
Start: 2019-11-21 | End: 2020-09-16

## 2019-11-21 RX ORDER — ATORVASTATIN CALCIUM 40 MG/1
40 TABLET, FILM COATED ORAL DAILY
Qty: 90 TABLET | Refills: 3 | Status: SHIPPED | OUTPATIENT
Start: 2019-11-21 | End: 2020-09-16

## 2019-11-21 ASSESSMENT — MIFFLIN-ST. JEOR: SCORE: 1622.58

## 2019-11-21 NOTE — PROGRESS NOTES
"Subjective     Cecilio Navarro is a 79 year old male who presents to clinic today for the following health issues:    HPI   Hypertension Follow-up      Do you check your blood pressure regularly outside of the clinic? Yes     Are you following a low salt diet? No    Are your blood pressures ever more than 140 on the top number (systolic) OR more   than 90 on the bottom number (diastolic), for example 140/90? Yes at times     Nausea comes and goes- few weeks   Still having dizziness   Headaches starting     S: Cecilio Navarro is a 79 year old male, recovering from GI illness, still some nausea, .  Getting better.    Some dizzy spells now and then, not severe.  Doesn't check sugars.  Workup negative 2 weeks ago in clinic, echo pending.      BP running a bit high.  Willing to get back on normal dose of hydrochlorothiazide, was on 1/2 dose for a while, but needs full dose     Hyperlipidemia: statin.  Fills.  Tolerating      DM2: controlled on meds.  Wonders about meeting with DM edu, not sure but wants a referral if he decides to go.  Thinking he may want a monitor to check sugars.  Mild albuminuria      Problem list, med list, additional histories reviewed and updated, as indicated.      No cp or sob.  Walks the dog daily, no issues with that    O:BP (!) 144/90   Pulse 68   Temp 97.3  F (36.3  C) (Tympanic)   Resp 14   Ht 1.702 m (5' 7\")   Wt 93.9 kg (207 lb)   SpO2 99%   BMI 32.42 kg/m    GEN: Alert and oriented, in no acute distress  CV: RRR, no murmur  RESP: lungs clear bilaterally, good effort  EXT: no edema or lesions noted in lower extremities  ABD: nontender.  No distention or mass appreciated.    A: DM2 with nephropathy, stable        Htn, not controlled, increase hydrochlorothiazide today        Dizziness, nos.          Hyperlipidemia, stable    P: fills.  Reviewed labs.  See what echo shows.  No exertional sx.  F/u if dizziness not improving over time or if other sx arise, otherwise 6 mo.  Wife " here today too, she agrees with plan as well.     Weight management plan: diet/exercise

## 2019-11-22 ENCOUNTER — TELEPHONE (OUTPATIENT)
Dept: FAMILY MEDICINE | Facility: CLINIC | Age: 79
End: 2019-11-22

## 2019-11-22 NOTE — TELEPHONE ENCOUNTER
Diabetes Education Scheduling Outreach #1:    Call to patient to schedule. Left message with phone number to call to schedule.    Plan for 2nd outreach attempt within 1 week.    Brenda Nowak  [facility]  Diabetes and Nutrition Scheduling

## 2019-11-27 NOTE — TELEPHONE ENCOUNTER
Diabetes Education Scheduling Outreach #2:    Call to patient to schedule. Left message with phone number to call to schedule.    Brenda Nowak  [facility]  Diabetes and Nutrition Scheduling

## 2020-01-11 NOTE — PROGRESS NOTES
AUDIOLOGY REPORT    SUBJECTIVE: Cecilio Navarro is a 79 year old male was seen in the Audiology Clinic at  [facility] on 9/23/19 to discuss concerns with hearing and functional communication difficulties. The patient was accompanied by their wife. Cecilio has been seen previously on 9/10/2019, and results revealed a normal sloping to severe asymmetrical sensorineural hearing loss bilaterally.  The patient was medically evaluated and determined to be cleared for binaural hearing aids by Dr. Rj Yates. Cecilio notes difficulty with communication in a groups, in the car and with the TV.    OBJECTIVE:    Patient is a hearing aid candidate. Patient would like to move forward with a hearing aid evaluation today. Therefore, the patient was presented with different options for amplification to help aid in communication. Discussed styles, levels of technology and monaural vs. binaural fitting.     The hearing aid(s) mutually chosen were:  Binaural: Phonak Audeo M50-R  COLOR: P6  Graphite gray  BATTERY SIZE: Rechargabke  CANAL/ LENGTH: #1 M      ASSESSMENT:   No diagnosis found.    Reviewed purchase information and warranty information with patient. The 45 day trial period was explained to patient. The patient was given a copy of the [place] Department of [employer] consumer brochure on purchasing hearing instruments. Patient risk factors have been provided to the patient in writing prior to the sale of the hearing aid per FDA regulation. The risk factors are also available in the User Instructional Booklet to be presented on the day of the hearing aid fitting. Hearing aid(s) ordered. Hearing aid evaluation completed.    PLAN: Cecilio is scheduled to return in 2-3 weeks for a hearing aid fitting and programming. Purchase agreement will be completed on that date. Please contact this clinic with any questions or concerns.      Tamika RENEE, #[id number]             Emesis/Secretions

## 2020-01-13 ENCOUNTER — OFFICE VISIT (OUTPATIENT)
Dept: DERMATOLOGY | Facility: CLINIC | Age: 80
End: 2020-01-13
Payer: COMMERCIAL

## 2020-01-13 VITALS — HEART RATE: 84 BPM | OXYGEN SATURATION: 99 % | DIASTOLIC BLOOD PRESSURE: 71 MMHG | SYSTOLIC BLOOD PRESSURE: 134 MMHG

## 2020-01-13 DIAGNOSIS — L81.4 LENTIGO: ICD-10-CM

## 2020-01-13 DIAGNOSIS — Z87.2 HISTORY OF ACTINIC KERATOSES: ICD-10-CM

## 2020-01-13 DIAGNOSIS — L21.9 DERMATITIS, SEBORRHEIC: Primary | ICD-10-CM

## 2020-01-13 DIAGNOSIS — L82.1 SEBORRHEIC KERATOSIS: ICD-10-CM

## 2020-01-13 PROCEDURE — 99213 OFFICE O/P EST LOW 20 MIN: CPT | Performed by: DERMATOLOGY

## 2020-01-13 RX ORDER — DESONIDE 0.5 MG/G
CREAM TOPICAL
Qty: 60 G | Refills: 3 | Status: SHIPPED | OUTPATIENT
Start: 2020-01-13 | End: 2023-05-22

## 2020-01-13 RX ORDER — CICLOPIROX OLAMINE 7.7 MG/G
CREAM TOPICAL
Qty: 90 G | Refills: 3 | Status: SHIPPED | OUTPATIENT
Start: 2020-01-13 | End: 2023-05-22

## 2020-01-13 NOTE — PROGRESS NOTES
Cecilio Navarro is a 79 year old year old male patient here today for hx of pdt. Did well with it.  HE notes rash on sides of mouth.   .  Patient states this has been present for a while.  Patient reports the following symptoms:  itching.  Patient reports the following previous treatments none.  These treatments did not work.  Patient reports the following modifying factors none.  Associated symptoms: none.  Patient has no other skin complaints today.  Remainder of the HPI, Meds, PMH, Allergies, FH, and SH was reviewed in chart.      Past Medical History:   Diagnosis Date     Actinic keratoses      Type II or unspecified type diabetes mellitus without mention of complication, not stated as uncontrolled      Unspecified disorder of male genital organs      Unspecified essential hypertension        Past Surgical History:   Procedure Laterality Date     COLONOSCOPY  10/16/2013    Diverticulosis in sigmoid colon; repeat in 5 years; Surgeon: Garry Ambrose MD;  Location: [facility]     HC REMOVE TONSILS/ADENOIDS,<13 Y/O          Family History   Problem Relation Age of Onset     Hypertension Mother      Cerebrovascular Disease Mother         mentally affected     Cancer - colorectal Father         mets to liver  age76     Gastrointestinal Disease Brother         multilple colon polyps     Hypertension Brother      Gastrointestinal Disease Sister         reflux     Obesity Son      Allergies Daughter      Arthritis Brother      Melanoma No family hx of        Social History     Socioeconomic History     Marital status:      Spouse name: Not on file     Number of children: Not on file     Years of education: Not on file     Highest education level: Not on file   Occupational History     Not on file   Social Needs     Financial resource strain: Not on file     Food insecurity:     Worry: Not on file     Inability: Not on file     Transportation needs:     Medical: Not on file     Non-medical: Not on file    Tobacco Use     Smoking status: Never Smoker     Smokeless tobacco: Never Used   Substance and Sexual Activity     Alcohol use: Yes     Comment: occasional social     Drug use: No     Sexual activity: Yes     Partners: Female   Lifestyle     Physical activity:     Days per week: Not on file     Minutes per session: Not on file     Stress: Not on file   Relationships     Social connections:     Talks on phone: Not on file     Gets together: Not on file     Attends Restorationist service: Not on file     Active member of club or organization: Not on file     Attends meetings of clubs or organizations: Not on file     Relationship status: Not on file     Intimate partner violence:     Fear of current or ex partner: Not on file     Emotionally abused: Not on file     Physically abused: Not on file     Forced sexual activity: Not on file   Other Topics Concern      Service No     Blood Transfusions No     Caffeine Concern Yes     Comment: 1-2 day     Occupational Exposure No     Hobby Hazards Yes     Comment: [employer]     Sleep Concern No     Stress Concern No     Weight Concern No     Special Diet Yes     Comment: Diabetic and low fat     Back Care No     Exercise Yes     Bike Helmet Not Asked     Seat Belt Yes     Self-Exams Yes     Comment: check blood sugars     Parent/sibling w/ CABG, MI or angioplasty before 65F 55M? Not Asked   Social History Narrative     Not on file       Outpatient Encounter Medications as of 1/13/2020   Medication Sig Dispense Refill     aspirin 81 MG tablet Take 81 mg by mouth daily       atorvastatin (LIPITOR) 40 MG tablet Take 1 tablet (40 mg) by mouth daily 90 tablet 3     glipiZIDE (GLUCOTROL XL) 10 MG 24 hr tablet Take 1 tablet (10 mg) by mouth daily 90 tablet 3     hydrochlorothiazide (HYDRODIURIL) 25 MG tablet Take 1 tablet (25 mg) by mouth daily 90 tablet 3     Ibuprofen (ADVIL) 200 MG capsule Take 400 mg by mouth 2 times daily as needed.       lisinopril (PRINIVIL/ZESTRIL) 40 MG  tablet Take 1 tablet (40 mg) by mouth daily 90 tablet 3     metFORMIN (GLUCOPHAGE) 1000 MG tablet Take 1 tablet (1,000 mg) by mouth 2 times daily (with meals) 180 tablet 3     sildenafil (VIAGRA) 100 MG cap/tab Take 1 tablet (100 mg) by mouth daily as needed for erectile dysfunction (Patient not taking: Reported on 1/13/2020) 3 tablet 11     vardenafil (LEVITRA) 20 MG tablet Take 1 tablet (20 mg) by mouth daily as needed for erectile dysfunction (Patient not taking: Reported on 1/13/2020) 5 tablet 11     No facility-administered encounter medications on file as of 1/13/2020.              Review Of Systems  Skin: As above  Eyes: negative  Ears/Nose/Throat: negative  Respiratory: No shortness of breath, dyspnea on exertion, cough, or hemoptysis  Cardiovascular: negative  Gastrointestinal: negative  Genitourinary: negative  Musculoskeletal: negative  Neurologic: negative  Psychiatric: negative  Hematologic/Lymphatic/Immunologic: negative  Endocrine: negative      O:   NAD, WDWN, Alert & Oriented, Mood & Affect wnl, Vitals stable   Here today alone   /71 (BP Location: Right arm, Patient Position: Chair, Cuff Size: Adult Large)   Pulse 84   SpO2 99%    General appearance normal   Vitals stable   Alert, oriented and in no acute distress      Following lymph nodes palpated: Occipital, Cervical, Supraclavicular no lad        Stuck on papules and brown macules on trunk and ext    [place] red scaly papule on nlf     The remainder of expanded problem focused exam was unremarkable; the following areas were examined:  scalp/hair, conjunctiva/lids, face, neck, lips, chest, digits/nails, RUE, LUE.      Eyes: Conjunctivae/lids:Normal     ENT: Lips, buccal mucosa, tongue: normal    MSK:Normal    Cardiovascular: peripheral edema none    Pulm: Breathing Normal    Lymph Nodes: No Head and Neck Lymphadenopathy     Neuro/Psych: Orientation:Alert and Orientedx3 ; Mood/Affect:normal       A/P:  1. Seborrheic keratosis, lentigo,   2.  Hx of actinic keratosis  Did well with pdt  3. seb derm  Loprox bedtime  Desonide prn     BENIGN LESIONS DISCUSSED WITH PATIENT:  I discussed the specifics of tumor, prognosis, and genetics of benign lesions.  I explained that treatment of these lesions would be purely cosmetic and not medically neccessary.  I discussed with patient different removal options including excision, cautery and /or laser.      Nature and genetics of benign skin lesions dicussed with patient.  Signs and Symptoms of skin cancer discussed with patient.  Patient encouraged to perform monthly skin exams.  UV precautions reviewed with patient.  Patient to follow up with Primary Care provider regarding elevated blood pressure.  Skin care regimen reviewed with patient: Eliminate harsh soaps, i.e. Dial, zest, irsih spring; Mild soaps such as Cetaphil or Dove sensitive skin, avoid hot or cold showers, aggressive use of emollients including vanicream, cetaphil or cerave discussed with patient.    Risks of non-melanoma skin cancer discussed with patient   Return to clinic 6 months skn check

## 2020-01-13 NOTE — LETTER
1/13/2020         RE: Cecilio Navarro  [address] [facility] [address]        Dear Colleague,    Thank you for referring your patient, Cecilio Navarro, to the [facility]. Please see a copy of my visit note below.    Cecilio Navarro is a 79 year old year old male patient here today for hx of pdt. Did well with it.  HE notes rash on sides of mouth.   .  Patient states this has been present for a while.  Patient reports the following symptoms:  itching.  Patient reports the following previous treatments none.  These treatments did not work.  Patient reports the following modifying factors none.  Associated symptoms: none.  Patient has no other skin complaints today.  Remainder of the HPI, Meds, PMH, Allergies, FH, and SH was reviewed in chart.      Past Medical History:   Diagnosis Date     Actinic keratoses      Type II or unspecified type diabetes mellitus without mention of complication, not stated as uncontrolled      Unspecified disorder of male genital organs      Unspecified essential hypertension        Past Surgical History:   Procedure Laterality Date     COLONOSCOPY  10/16/2013    Diverticulosis in sigmoid colon; repeat in 5 years; Surgeon: Garry Ambrose MD;  Location: [facility]     HC REMOVE TONSILS/ADENOIDS,<13 Y/O          Family History   Problem Relation Age of Onset     Hypertension Mother      Cerebrovascular Disease Mother         mentally affected     Cancer - colorectal Father         mets to liver  age74     Gastrointestinal Disease Brother         multilple colon polyps     Hypertension Brother      Gastrointestinal Disease Sister         reflux     Obesity Son      Allergies Daughter      Arthritis Brother      Melanoma No family hx of        Social History     Socioeconomic History     Marital status:      Spouse name: Not on file     Number of children: Not on file     Years of education: Not on file     Highest education level: Not on file    Occupational History     Not on file   Social Needs     Financial resource strain: Not on file     Food insecurity:     Worry: Not on file     Inability: Not on file     Transportation needs:     Medical: Not on file     Non-medical: Not on file   Tobacco Use     Smoking status: Never Smoker     Smokeless tobacco: Never Used   Substance and Sexual Activity     Alcohol use: Yes     Comment: occasional social     Drug use: No     Sexual activity: Yes     Partners: Female   Lifestyle     Physical activity:     Days per week: Not on file     Minutes per session: Not on file     Stress: Not on file   Relationships     Social connections:     Talks on phone: Not on file     Gets together: Not on file     Attends Mormonism service: Not on file     Active member of club or organization: Not on file     Attends meetings of clubs or organizations: Not on file     Relationship status: Not on file     Intimate partner violence:     Fear of current or ex partner: Not on file     Emotionally abused: Not on file     Physically abused: Not on file     Forced sexual activity: Not on file   Other Topics Concern      Service No     Blood Transfusions No     Caffeine Concern Yes     Comment: 1-2 day     Occupational Exposure No     Hobby Hazards Yes     Comment: [employer]     Sleep Concern No     Stress Concern No     Weight Concern No     Special Diet Yes     Comment: Diabetic and low fat     Back Care No     Exercise Yes     Bike Helmet Not Asked     Seat Belt Yes     Self-Exams Yes     Comment: check blood sugars     Parent/sibling w/ CABG, MI or angioplasty before 65F 55M? Not Asked   Social History Narrative     Not on file       Outpatient Encounter Medications as of 1/13/2020   Medication Sig Dispense Refill     aspirin 81 MG tablet Take 81 mg by mouth daily       atorvastatin (LIPITOR) 40 MG tablet Take 1 tablet (40 mg) by mouth daily 90 tablet 3     glipiZIDE (GLUCOTROL XL) 10 MG 24 hr tablet Take 1 tablet (10 mg) by  mouth daily 90 tablet 3     hydrochlorothiazide (HYDRODIURIL) 25 MG tablet Take 1 tablet (25 mg) by mouth daily 90 tablet 3     Ibuprofen (ADVIL) 200 MG capsule Take 400 mg by mouth 2 times daily as needed.       lisinopril (PRINIVIL/ZESTRIL) 40 MG tablet Take 1 tablet (40 mg) by mouth daily 90 tablet 3     metFORMIN (GLUCOPHAGE) 1000 MG tablet Take 1 tablet (1,000 mg) by mouth 2 times daily (with meals) 180 tablet 3     sildenafil (VIAGRA) 100 MG cap/tab Take 1 tablet (100 mg) by mouth daily as needed for erectile dysfunction (Patient not taking: Reported on 1/13/2020) 3 tablet 11     vardenafil (LEVITRA) 20 MG tablet Take 1 tablet (20 mg) by mouth daily as needed for erectile dysfunction (Patient not taking: Reported on 1/13/2020) 5 tablet 11     No facility-administered encounter medications on file as of 1/13/2020.              Review Of Systems  Skin: As above  Eyes: negative  Ears/Nose/Throat: negative  Respiratory: No shortness of breath, dyspnea on exertion, cough, or hemoptysis  Cardiovascular: negative  Gastrointestinal: negative  Genitourinary: negative  Musculoskeletal: negative  Neurologic: negative  Psychiatric: negative  Hematologic/Lymphatic/Immunologic: negative  Endocrine: negative      O:   NAD, WDWN, Alert & Oriented, Mood & Affect wnl, Vitals stable   Here today alone   /71 (BP Location: Right arm, Patient Position: Chair, Cuff Size: Adult Large)   Pulse 84   SpO2 99%    General appearance normal   Vitals stable   Alert, oriented and in no acute distress      Following lymph nodes palpated: Occipital, Cervical, Supraclavicular no lad        Stuck on papules and brown macules on trunk and ext    [place] red scaly papule on nlf     The remainder of expanded problem focused exam was unremarkable; the following areas were examined:  scalp/hair, conjunctiva/lids, face, neck, lips, chest, digits/nails, RUE, LUE.      Eyes: Conjunctivae/lids:Normal     ENT: Lips, buccal mucosa, tongue:  normal    MSK:Normal    Cardiovascular: peripheral edema none    Pulm: Breathing Normal    Lymph Nodes: No Head and Neck Lymphadenopathy     Neuro/Psych: Orientation:Alert and Orientedx3 ; Mood/Affect:normal       A/P:  1. Seborrheic keratosis, lentigo,   2. Hx of actinic keratosis  Did well with pdt  3. seb derm  Loprox bedtime  Desonide prn     BENIGN LESIONS DISCUSSED WITH PATIENT:  I discussed the specifics of tumor, prognosis, and genetics of benign lesions.  I explained that treatment of these lesions would be purely cosmetic and not medically neccessary.  I discussed with patient different removal options including excision, cautery and /or laser.      Nature and genetics of benign skin lesions dicussed with patient.  Signs and Symptoms of skin cancer discussed with patient.  Patient encouraged to perform monthly skin exams.  UV precautions reviewed with patient.  Patient to follow up with Primary Care provider regarding elevated blood pressure.  Skin care regimen reviewed with patient: Eliminate harsh soaps, i.e. Dial, zest, irsih spring; Mild soaps such as Cetaphil or Dove sensitive skin, avoid hot or cold showers, aggressive use of emollients including vanicream, cetaphil or cerave discussed with patient.    Risks of non-melanoma skin cancer discussed with patient   Return to clinic 6 months skn check     Again, thank you for allowing me to participate in the care of your patient.        Sincerely,        Tejinder Jarvis MD

## 2020-01-13 NOTE — NURSING NOTE
"Chief Complaint   Patient presents with     Derm Problem     look at face from last treatment of PDT in April of 2019       Initial /71 (BP Location: Right arm, Patient Position: Chair, Cuff Size: Adult Large)   Pulse 84   SpO2 99%  Estimated body mass index is 32.42 kg/m  as calculated from the following:    Height as of 11/21/19: 1.702 m (5' 7\").    Weight as of 11/21/19: 93.9 kg (207 lb).  Medications and allergies reviewed.    Nereida FERNANDEZ CMA (AAMA)    "
ambulatory

## 2020-02-10 ENCOUNTER — OFFICE VISIT (OUTPATIENT)
Dept: AUDIOLOGY | Facility: CLINIC | Age: 80
End: 2020-02-10
Payer: COMMERCIAL

## 2020-02-10 DIAGNOSIS — H90.3 SENSORINEURAL HEARING LOSS, ASYMMETRICAL: Primary | ICD-10-CM

## 2020-02-10 PROCEDURE — 99207 ZZC NO CHARGE LOS: CPT | Performed by: AUDIOLOGIST

## 2020-02-10 PROCEDURE — V5299 HEARING SERVICE: HCPCS | Performed by: AUDIOLOGIST

## 2020-02-10 NOTE — PROGRESS NOTES
[facility]        SUBJECTIVE:  Cecilio Navarro, 79 year old male comes in with his wife for a recheck of his 2019 [employer]ak [employer] M50-R hearing aids. He reports the left hearing aid stopped working one day. He reports it has now been working well for the past week.     OBJECTIVE:  An otoscopic examination was done and revealed clear external auditory canals bilaterally.     Verified hearing aid functionality. Reviewed hearing aid care and use. Changed wax guard for patient. Discussed what happens when cerumen plugs the hearing aid.     See chart in the hearing aid room.     ASSESSMENT/PLAN:    Patient will drop off hearing aid at the specialty clinic  for me to send in for warranty repair if any further problems arise.     Tamika BRENNAN-RAMON, #[id number]

## 2020-07-20 ENCOUNTER — OFFICE VISIT (OUTPATIENT)
Dept: DERMATOLOGY | Facility: CLINIC | Age: 80
End: 2020-07-20
Payer: COMMERCIAL

## 2020-07-20 ENCOUNTER — TELEPHONE (OUTPATIENT)
Dept: DERMATOLOGY | Facility: CLINIC | Age: 80
End: 2020-07-20

## 2020-07-20 VITALS
RESPIRATION RATE: 16 BRPM | OXYGEN SATURATION: 98 % | SYSTOLIC BLOOD PRESSURE: 150 MMHG | DIASTOLIC BLOOD PRESSURE: 86 MMHG | HEART RATE: 73 BPM

## 2020-07-20 DIAGNOSIS — L82.1 SEBORRHEIC KERATOSIS: ICD-10-CM

## 2020-07-20 DIAGNOSIS — C44.519 BASAL CELL CARCINOMA (BCC) OF STERNAL REGION: Primary | ICD-10-CM

## 2020-07-20 DIAGNOSIS — D23.9 DERMAL NEVUS: ICD-10-CM

## 2020-07-20 DIAGNOSIS — Z85.828 HISTORY OF SKIN CANCER: ICD-10-CM

## 2020-07-20 DIAGNOSIS — D18.01 ANGIOMA OF SKIN: ICD-10-CM

## 2020-07-20 DIAGNOSIS — L81.4 LENTIGO: ICD-10-CM

## 2020-07-20 DIAGNOSIS — C44.319 BASAL CELL CARCINOMA (BCC) OF FOREHEAD: ICD-10-CM

## 2020-07-20 PROCEDURE — 11103 TANGNTL BX SKIN EA SEP/ADDL: CPT | Performed by: DERMATOLOGY

## 2020-07-20 PROCEDURE — 88331 PATH CONSLTJ SURG 1 BLK 1SPC: CPT | Performed by: DERMATOLOGY

## 2020-07-20 PROCEDURE — 99214 OFFICE O/P EST MOD 30 MIN: CPT | Mod: 25 | Performed by: DERMATOLOGY

## 2020-07-20 PROCEDURE — 11102 TANGNTL BX SKIN SINGLE LES: CPT | Performed by: DERMATOLOGY

## 2020-07-20 NOTE — NURSING NOTE
"Initial BP (!) 150/86 (BP Location: Right arm, Patient Position: Sitting, Cuff Size: Adult Large)   Pulse 73   Resp 16   SpO2 98%  Estimated body mass index is 32.42 kg/m  as calculated from the following:    Height as of 11/21/19: 1.702 m (5' 7\").    Weight as of 11/21/19: 93.9 kg (207 lb). .    Stephanie Pierce, [facility]    "

## 2020-07-20 NOTE — PATIENT INSTRUCTIONS
Wound Care Instructions     FOR SUPERFICIAL WOUNDS     [facility] [phone]    [facility] [phone]                       AFTER 24 HOURS YOU SHOULD REMOVE THE BANDAGE AND BEGIN DAILY DRESSING CHANGES AS FOLLOWS:     1) Remove Dressing.     2) Clean and dry the area with tap water using a Q-tip or sterile gauze pad.     3) Apply Vaseline, Aquaphor, Polysporin ointment or Bacitracin ointment over entire wound.  Do NOT use Neosporin ointment.     4) Cover the wound with a band-aid, or a sterile non-stick gauze pad and micropore paper tape      REPEAT THESE INSTRUCTIONS AT LEAST ONCE A DAY UNTIL THE WOUND HAS COMPLETELY HEALED.    It is an old wives tale that a wound heals better when it is exposed to air and allowed to dry out. The wound will heal faster with a better cosmetic result if it is kept moist with ointment and covered with a bandage.    **Do not let the wound dry out.**      Supplies Needed:      *Cotton tipped applicators (Q-tips)    *Polysporin Ointment or Bacitracin Ointment (NOT NEOSPORIN)    *Band-aids or non-stick gauze pads and micropore paper tape.      PATIENT INFORMATION:    During the healing process you will notice a number of changes. All wounds develop a small halo of redness surrounding the wound.  This means healing is occurring. Severe itching with extensive redness usually indicates sensitivity to the ointment or bandage tape used to dress the wound.  You should call our office if this develops.      Swelling  and/or discoloration around your surgical site is common, particularly when performed around the eye.    All wounds normally drain.  The larger the wound the more drainage there will be.  After 7-10 days, you will notice the wound beginning to shrink and new skin will begin to grow.  The wound is healed when you can see skin has formed over the entire area.  A healed wound has a healthy, shiny look to the surface and is red to dark pink in color  to normalize.  Wounds may take approximately 4-6 weeks to heal.  Larger wounds may take 6-8 weeks.  After the wound is healed you may discontinue dressing changes.    You may experience a sensation of tightness as your wound heals. This is normal and will gradually subside.    Your healed wound may be sensitive to temperature changes. This sensitivity improves with time, but if you re having a lot of discomfort, try to avoid temperature extremes.    Patients frequently experience itching after their wound appears to have healed because of the continue healing under the skin.  Plain Vaseline will help relieve the itching.        POSSIBLE COMPLICATIONS    BLEEDIN. Leave the bandage in place.  2. Use tightly rolled up gauze or a cloth to apply direct pressure over the bandage for 30  minutes.  3. Reapply pressure for an additional 30 minutes if necessary  4. Use additional gauze and tape to maintain pressure once the bleeding has stopped.

## 2020-07-20 NOTE — LETTER
7/20/2020         RE: Cecilio Navarro  [address] [facility] [address]        Dear Colleague,    Thank you for referring your patient, Cecilio Navarro, to the [facility]. Please see a copy of my visit note below.    Cecilio Navarro is a 79 year old year old male patient here today for spot on face and chest.   .  Patient states this has been present for a while.  Patient reports the following symptoms:  bleeding.  Patient reports the following previous treatments none.  These treatments did not work.  Patient reports the following modifying factors none.  Associated symptoms: none.  Patient has no other skin complaints today.  Remainder of the HPI, Meds, PMH, Allergies, FH, and SH was reviewed in chart.      Past Medical History:   Diagnosis Date     Actinic keratoses      Type II or unspecified type diabetes mellitus without mention of complication, not stated as uncontrolled      Unspecified disorder of male genital organs      Unspecified essential hypertension        Past Surgical History:   Procedure Laterality Date     COLONOSCOPY  10/16/2013    Diverticulosis in sigmoid colon; repeat in 5 years; Surgeon: Garry Ambrose MD;  Location: [facility]     HC REMOVE TONSILS/ADENOIDS,<11 Y/O          Family History   Problem Relation Age of Onset     Hypertension Mother      Cerebrovascular Disease Mother         mentally affected     Cancer - colorectal Father         mets to liver  age76     Gastrointestinal Disease Brother         multilple colon polyps     Hypertension Brother      Gastrointestinal Disease Sister         reflux     Obesity Son      Allergies Daughter      Arthritis Brother      Melanoma No family hx of        Social History     Socioeconomic History     Marital status:      Spouse name: Not on file     Number of children: Not on file     Years of education: Not on file     Highest education level: Not on file   Occupational History     Not on file   Social  Needs     Financial resource strain: Not on file     Food insecurity     Worry: Not on file     Inability: Not on file     Transportation needs     Medical: Not on file     Non-medical: Not on file   Tobacco Use     Smoking status: Never Smoker     Smokeless tobacco: Never Used   Substance and Sexual Activity     Alcohol use: Yes     Comment: occasional social     Drug use: No     Sexual activity: Yes     Partners: Female   Lifestyle     Physical activity     Days per week: Not on file     Minutes per session: Not on file     Stress: Not on file   Relationships     Social connections     Talks on phone: Not on file     Gets together: Not on file     Attends Judaism service: Not on file     Active member of club or organization: Not on file     Attends meetings of clubs or organizations: Not on file     Relationship status: Not on file     Intimate partner violence     Fear of current or ex partner: Not on file     Emotionally abused: Not on file     Physically abused: Not on file     Forced sexual activity: Not on file   Other Topics Concern      Service No     Blood Transfusions No     Caffeine Concern Yes     Comment: 1-2 day     Occupational Exposure No     Hobby Hazards Yes     Comment: Hunt     Sleep Concern No     Stress Concern No     Weight Concern No     Special Diet Yes     Comment: Diabetic and low fat     Back Care No     Exercise Yes     Bike Helmet Not Asked     Seat Belt Yes     Self-Exams Yes     Comment: check blood sugars     Parent/sibling w/ CABG, MI or angioplasty before 65F 55M? Not Asked   Social History Narrative     Not on file       Outpatient Encounter Medications as of 7/20/2020   Medication Sig Dispense Refill     aspirin 81 MG tablet Take 81 mg by mouth daily       atorvastatin (LIPITOR) 40 MG tablet Take 1 tablet (40 mg) by mouth daily 90 tablet 3     ciclopirox (LOPROX) 0.77 % cream Daily at bedtime to face 90 g 3     desonide (DESOWEN) 0.05 % external cream Every morning to  face 60 g 3     glipiZIDE (GLUCOTROL XL) 10 MG 24 hr tablet Take 1 tablet (10 mg) by mouth daily 90 tablet 3     hydrochlorothiazide (HYDRODIURIL) 25 MG tablet Take 1 tablet (25 mg) by mouth daily 90 tablet 3     Ibuprofen (ADVIL) 200 MG capsule Take 400 mg by mouth 2 times daily as needed.       lisinopril (PRINIVIL/ZESTRIL) 40 MG tablet Take 1 tablet (40 mg) by mouth daily 90 tablet 3     metFORMIN (GLUCOPHAGE) 1000 MG tablet Take 1 tablet (1,000 mg) by mouth 2 times daily (with meals) 180 tablet 3     sildenafil (VIAGRA) 100 MG cap/tab Take 1 tablet (100 mg) by mouth daily as needed for erectile dysfunction (Patient not taking: Reported on 1/13/2020) 3 tablet 11     vardenafil (LEVITRA) 20 MG tablet Take 1 tablet (20 mg) by mouth daily as needed for erectile dysfunction (Patient not taking: Reported on 1/13/2020) 5 tablet 11     No facility-administered encounter medications on file as of 7/20/2020.              Review Of Systems  Skin: As above  Eyes: negative  Ears/Nose/Throat: negative  Respiratory: No shortness of breath, dyspnea on exertion, cough, or hemoptysis  Cardiovascular: negative  Gastrointestinal: negative  Genitourinary: negative  Musculoskeletal: negative  Neurologic: negative  Psychiatric: negative  Hematologic/Lymphatic/Immunologic: negative  Endocrine: negative      O:   NAD, WDWN, Alert & Oriented, Mood & Affect wnl, Vitals stable   Here today alone   BP (!) 150/86 (BP Location: Right arm, Patient Position: Sitting, Cuff Size: Adult Large)   Pulse 73   Resp 16   SpO2 98%    General appearance normal   Vitals stable   Alert, oriented and in no acute distress      Following lymph nodes palpated: Occipital, Cervical, Supraclavicular no lad   L sternal notch ulcerated 1.3cm plaque   R forehead 4mm shiny papule      Stuck on papules and brown macules on trunk and ext   Red papules on trunk  Flesh colored papules on trunk     The remainder of the full exam was normal; the following areas were  examined:  conjunctiva/lids, oral mucosa, neck, peripheral vascular system, abdomen, lymph nodes, digits/nails, eccrine and apocrine glands, scalp/hair, face, neck, chest, abdomen, buttocks, back, RUE, LUE, RLE, LLE       Eyes: Conjunctivae/lids:Normal     ENT: Lips, buccal mucosa, tongue: normal    MSK:Normal    Cardiovascular: peripheral edema none    Pulm: Breathing Normal    Lymph Nodes: No Head and Neck Lymphadenopathy     Neuro/Psych: Orientation:Alert and Orientedx3 ; Mood/Affect:normal       MICRO:     L sternal notch:Orthokeratosis of epidermis with a proliferation of nests of basaloid cells, with peripheral palisading and a haphazard arrangement in the center extending into the dermis, forming nodules.  The tumor cells have hyperchromatic nuclei. Poor cytoplasm and intercellular bridging.    R forehead:Orthokeratosis of epidermis with a proliferation of nests of basaloid cells, with peripheral palisading and a haphazard arrangement in the center extending into the dermis, forming nodules.  The tumor cells have hyperchromatic nuclei. Poor cytoplasm and intercellular bridging.    A/P:  1. Seborrheic keratosis, lentigo, angioma, dermal nevus, hx of non-melanoma skin cancer   2. R/o basal cell carcinoma   TANGENTIAL BIOPSY IN HOUSE:  After consent, anesthesia with LEC and prep, tangential excision performed and dx above confirmed with frozen section histology.  No complications and routine wound care.      I have personally reviewed all specimens and/or slides and used them with my medical judgement to determine or confirm the final diagnosis.     Patient told result   L sternal notch basal cell carcinoma   R forehead basal cell carcinoma   Schedule excision       BENIGN LESIONS DISCUSSED WITH PATIENT:  I discussed the specifics of tumor, prognosis, and genetics of benign lesions.  I explained that treatment of these lesions would be purely cosmetic and not medically neccessary.  I discussed with patient  different removal options including excision, cautery and /or laser.      Nature and genetics of benign skin lesions dicussed with patient.  Signs and Symptoms of skin cancer discussed with patient.  Patient encouraged to perform monthly skin exams.  UV precautions reviewed with patient.  Skin care regimen reviewed with patient: Eliminate harsh soaps, i.e. Dial, zest, irsih spring; Mild soaps such as Cetaphil or Dove sensitive skin, avoid hot or cold showers, aggressive use of emollients including vanicream, cetaphil or cerave discussed with patient.    Risks of non-melanoma skin cancer discussed with patient   Return to clinic next appt    Again, thank you for allowing me to participate in the care of your patient.        Sincerely,        Tejinder Jarvis MD

## 2020-07-20 NOTE — PROGRESS NOTES
Cecilio Navarro is a 79 year old year old male patient here today for spot on face and chest.   .  Patient states this has been present for a while.  Patient reports the following symptoms:  bleeding.  Patient reports the following previous treatments none.  These treatments did not work.  Patient reports the following modifying factors none.  Associated symptoms: none.  Patient has no other skin complaints today.  Remainder of the HPI, Meds, PMH, Allergies, FH, and SH was reviewed in chart.      Past Medical History:   Diagnosis Date     Actinic keratoses      Type II or unspecified type diabetes mellitus without mention of complication, not stated as uncontrolled      Unspecified disorder of male genital organs      Unspecified essential hypertension        Past Surgical History:   Procedure Laterality Date     COLONOSCOPY  10/16/2013    Diverticulosis in sigmoid colon; repeat in 5 years; Surgeon: Garry Ambrose MD;  Location: [facility]     HC REMOVE TONSILS/ADENOIDS,<13 Y/O          Family History   Problem Relation Age of Onset     Hypertension Mother      Cerebrovascular Disease Mother         mentally affected     Cancer - colorectal Father         mets to liver  age74     Gastrointestinal Disease Brother         multilple colon polyps     Hypertension Brother      Gastrointestinal Disease Sister         reflux     Obesity Son      Allergies Daughter      Arthritis Brother      Melanoma No family hx of        Social History     Socioeconomic History     Marital status:      Spouse name: Not on file     Number of children: Not on file     Years of education: Not on file     Highest education level: Not on file   Occupational History     Not on file   Social Needs     Financial resource strain: Not on file     Food insecurity     Worry: Not on file     Inability: Not on file     Transportation needs     Medical: Not on file     Non-medical: Not on file   Tobacco Use     Smoking status: Never Smoker      Smokeless tobacco: Never Used   Substance and Sexual Activity     Alcohol use: Yes     Comment: occasional social     Drug use: No     Sexual activity: Yes     Partners: Female   Lifestyle     Physical activity     Days per week: Not on file     Minutes per session: Not on file     Stress: Not on file   Relationships     Social connections     Talks on phone: Not on file     Gets together: Not on file     Attends Zoroastrianism service: Not on file     Active member of club or organization: Not on file     Attends meetings of clubs or organizations: Not on file     Relationship status: Not on file     Intimate partner violence     Fear of current or ex partner: Not on file     Emotionally abused: Not on file     Physically abused: Not on file     Forced sexual activity: Not on file   Other Topics Concern      Service No     Blood Transfusions No     Caffeine Concern Yes     Comment: 1-2 day     Occupational Exposure No     Hobby Hazards Yes     Comment: [employer]     Sleep Concern No     Stress Concern No     Weight Concern No     Special Diet Yes     Comment: Diabetic and low fat     Back Care No     Exercise Yes     Bike Helmet Not Asked     Seat Belt Yes     Self-Exams Yes     Comment: check blood sugars     Parent/sibling w/ CABG, MI or angioplasty before 65F 55M? Not Asked   Social History Narrative     Not on file       Outpatient Encounter Medications as of 7/20/2020   Medication Sig Dispense Refill     aspirin 81 MG tablet Take 81 mg by mouth daily       atorvastatin (LIPITOR) 40 MG tablet Take 1 tablet (40 mg) by mouth daily 90 tablet 3     ciclopirox (LOPROX) 0.77 % cream Daily at bedtime to face 90 g 3     desonide (DESOWEN) 0.05 % external cream Every morning to face 60 g 3     glipiZIDE (GLUCOTROL XL) 10 MG 24 hr tablet Take 1 tablet (10 mg) by mouth daily 90 tablet 3     hydrochlorothiazide (HYDRODIURIL) 25 MG tablet Take 1 tablet (25 mg) by mouth daily 90 tablet 3     Ibuprofen (ADVIL) 200 MG  capsule Take 400 mg by mouth 2 times daily as needed.       lisinopril (PRINIVIL/ZESTRIL) 40 MG tablet Take 1 tablet (40 mg) by mouth daily 90 tablet 3     metFORMIN (GLUCOPHAGE) 1000 MG tablet Take 1 tablet (1,000 mg) by mouth 2 times daily (with meals) 180 tablet 3     sildenafil (VIAGRA) 100 MG cap/tab Take 1 tablet (100 mg) by mouth daily as needed for erectile dysfunction (Patient not taking: Reported on 1/13/2020) 3 tablet 11     vardenafil (LEVITRA) 20 MG tablet Take 1 tablet (20 mg) by mouth daily as needed for erectile dysfunction (Patient not taking: Reported on 1/13/2020) 5 tablet 11     No facility-administered encounter medications on file as of 7/20/2020.              Review Of Systems  Skin: As above  Eyes: negative  Ears/Nose/Throat: negative  Respiratory: No shortness of breath, dyspnea on exertion, cough, or hemoptysis  Cardiovascular: negative  Gastrointestinal: negative  Genitourinary: negative  Musculoskeletal: negative  Neurologic: negative  Psychiatric: negative  Hematologic/Lymphatic/Immunologic: negative  Endocrine: negative      O:   NAD, WDWN, Alert & Oriented, Mood & Affect wnl, Vitals stable   Here today alone   BP (!) 150/86 (BP Location: Right arm, Patient Position: Sitting, Cuff Size: Adult Large)   Pulse 73   Resp 16   SpO2 98%    General appearance normal   Vitals stable   Alert, oriented and in no acute distress      Following lymph nodes palpated: Occipital, Cervical, Supraclavicular no lad   L sternal notch ulcerated 1.3cm plaque   R forehead 4mm shiny papule      Stuck on papules and brown macules on trunk and ext   Red papules on trunk  Flesh colored papules on trunk     The remainder of the full exam was normal; the following areas were examined:  conjunctiva/lids, oral mucosa, neck, peripheral vascular system, abdomen, lymph nodes, digits/nails, eccrine and apocrine glands, scalp/hair, face, neck, chest, abdomen, buttocks, back, RUE, LUE, RLE, LLE       Eyes:  Conjunctivae/lids:Normal     ENT: Lips, buccal mucosa, tongue: normal    MSK:Normal    Cardiovascular: peripheral edema none    Pulm: Breathing Normal    Lymph Nodes: No Head and Neck Lymphadenopathy     Neuro/Psych: Orientation:Alert and Orientedx3 ; Mood/Affect:normal       MICRO:     L sternal notch:Orthokeratosis of epidermis with a proliferation of nests of basaloid cells, with peripheral palisading and a haphazard arrangement in the center extending into the dermis, forming nodules.  The tumor cells have hyperchromatic nuclei. Poor cytoplasm and intercellular bridging.    R forehead:Orthokeratosis of epidermis with a proliferation of nests of basaloid cells, with peripheral palisading and a haphazard arrangement in the center extending into the dermis, forming nodules.  The tumor cells have hyperchromatic nuclei. Poor cytoplasm and intercellular bridging.    A/P:  1. Seborrheic keratosis, lentigo, angioma, dermal nevus, hx of non-melanoma skin cancer   2. R/o basal cell carcinoma   TANGENTIAL BIOPSY IN HOUSE:  After consent, anesthesia with LEC and prep, tangential excision performed and dx above confirmed with frozen section histology.  No complications and routine wound care.      I have personally reviewed all specimens and/or slides and used them with my medical judgement to determine or confirm the final diagnosis.     Patient told result   L sternal notch basal cell carcinoma   R forehead basal cell carcinoma   Schedule excision       BENIGN LESIONS DISCUSSED WITH PATIENT:  I discussed the specifics of tumor, prognosis, and genetics of benign lesions.  I explained that treatment of these lesions would be purely cosmetic and not medically neccessary.  I discussed with patient different removal options including excision, cautery and /or laser.      Nature and genetics of benign skin lesions dicussed with patient.  Signs and Symptoms of skin cancer discussed with patient.  Patient encouraged to perform  monthly skin exams.  UV precautions reviewed with patient.  Skin care regimen reviewed with patient: Eliminate harsh soaps, i.e. Dial, zest, irsih spring; Mild soaps such as Cetaphil or Dove sensitive skin, avoid hot or cold showers, aggressive use of emollients including vanicream, cetaphil or cerave discussed with patient.    Risks of non-melanoma skin cancer discussed with patient   Return to clinic next appt

## 2020-07-20 NOTE — LETTER
[facility]  [address]  [facility] [place] [address]  Phone: [phone]    July 20, 2020    Cecilio Navarro                                                                                                         [address] [place] [id number]            Dear Mr. Navarro,    You are scheduled for Mohs Surgery on:    Monday July 27 th at 7:45 am.  Monday August 3 rd at 8:00 am.     Please check in at Dermatology Clinic.   (2nd Floor, last  Clinic on right up staircase or elevator -past OB/GYN clinic)    You don't need to arrive more than 5-10 minutes prior to your appointment time.     Be sure to eat a good breakfast and bathe and wash your hair prior to Surgery.    If you are taking any anti-coagulants that are prescribed by your Doctor (such as Coumadin/warfarin, Plavix, Aspirin, Ibuprofen), please continue taking them.     However, If you are taking anti-coagulants over the counter without  a Doctor's order for a Medical condition, please discontinue them 10 days prior to Surgery.      Please wear loose comfortable clothing as it could possibly be 4-6 hours until your surgery is completed depending upon how many layers of tissue need to be removed.     Wi-fi access is available.     Thank you,      Tejinder Jarvis MD/ Katie Ahumada RN

## 2020-07-20 NOTE — TELEPHONE ENCOUNTER
----- Message from Tejinder Jarvis MD sent at 7/20/2020 11:59 AM CDT -----  L sternal notch basal cell carcinoma   R forehead basal cell carcinoma   Schedule excision

## 2020-07-21 NOTE — TELEPHONE ENCOUNTER
Patient notified. Patient verbalized understanding. Scheduled for MOHS Surgery. Mohs brochure/Pre-op letter sent.   Katie Ahumada RN

## 2020-07-27 ENCOUNTER — OFFICE VISIT (OUTPATIENT)
Dept: DERMATOLOGY | Facility: CLINIC | Age: 80
End: 2020-07-27
Payer: COMMERCIAL

## 2020-07-27 VITALS — OXYGEN SATURATION: 97 % | SYSTOLIC BLOOD PRESSURE: 153 MMHG | HEART RATE: 81 BPM | DIASTOLIC BLOOD PRESSURE: 80 MMHG

## 2020-07-27 DIAGNOSIS — C44.519 BASAL CELL CARCINOMA (BCC) OF STERNAL REGION: Primary | ICD-10-CM

## 2020-07-27 PROCEDURE — 13101 CMPLX RPR TRUNK 2.6-7.5 CM: CPT | Mod: 51 | Performed by: DERMATOLOGY

## 2020-07-27 PROCEDURE — 17313 MOHS 1 STAGE T/A/L: CPT | Performed by: DERMATOLOGY

## 2020-07-27 NOTE — PROGRESS NOTES
Cecilio Navarro is a 80 year old year old male patient here today for evaluation and managment of basal cell carcinoma on left sternal notch.  Patient has no other skin complaints today.  Remainder of the HPI, Meds, PMH, Allergies, FH, and SH was reviewed in chart.      Past Medical History:   Diagnosis Date     Actinic keratoses      Type II or unspecified type diabetes mellitus without mention of complication, not stated as uncontrolled      Unspecified disorder of male genital organs      Unspecified essential hypertension        Past Surgical History:   Procedure Laterality Date     COLONOSCOPY  10/16/2013    Diverticulosis in sigmoid colon; repeat in 5 years; Surgeon: Garry Ambrose MD;  Location: [facility]     HC REMOVE TONSILS/ADENOIDS,<11 Y/O          Family History   Problem Relation Age of Onset     Hypertension Mother      Cerebrovascular Disease Mother         mentally affected     Cancer - colorectal Father         mets to liver  age76     Gastrointestinal Disease Brother         multilple colon polyps     Hypertension Brother      Gastrointestinal Disease Sister         reflux     Obesity Son      Allergies Daughter      Arthritis Brother      Melanoma No family hx of        Social History     Socioeconomic History     Marital status:      Spouse name: Not on file     Number of children: Not on file     Years of education: Not on file     Highest education level: Not on file   Occupational History     Not on file   Social Needs     Financial resource strain: Not on file     Food insecurity     Worry: Not on file     Inability: Not on file     Transportation needs     Medical: Not on file     Non-medical: Not on file   Tobacco Use     Smoking status: Never Smoker     Smokeless tobacco: Never Used   Substance and Sexual Activity     Alcohol use: Yes     Comment: occasional social     Drug use: No     Sexual activity: Yes     Partners: Female   Lifestyle     Physical activity     Days per  week: Not on file     Minutes per session: Not on file     Stress: Not on file   Relationships     Social connections     Talks on phone: Not on file     Gets together: Not on file     Attends Gnosticism service: Not on file     Active member of club or organization: Not on file     Attends meetings of clubs or organizations: Not on file     Relationship status: Not on file     Intimate partner violence     Fear of current or ex partner: Not on file     Emotionally abused: Not on file     Physically abused: Not on file     Forced sexual activity: Not on file   Other Topics Concern      Service No     Blood Transfusions No     Caffeine Concern Yes     Comment: 1-2 day     Occupational Exposure No     Hobby Hazards Yes     Comment: [employer]     Sleep Concern No     Stress Concern No     Weight Concern No     Special Diet Yes     Comment: Diabetic and low fat     Back Care No     Exercise Yes     Bike Helmet Not Asked     Seat Belt Yes     Self-Exams Yes     Comment: check blood sugars     Parent/sibling w/ CABG, MI or angioplasty before 65F 55M? Not Asked   Social History Narrative     Not on file       Outpatient Encounter Medications as of 7/27/2020   Medication Sig Dispense Refill     aspirin 81 MG tablet Take 81 mg by mouth daily       atorvastatin (LIPITOR) 40 MG tablet Take 1 tablet (40 mg) by mouth daily 90 tablet 3     ciclopirox (LOPROX) 0.77 % cream Daily at bedtime to face 90 g 3     desonide (DESOWEN) 0.05 % external cream Every morning to face 60 g 3     glipiZIDE (GLUCOTROL XL) 10 MG 24 hr tablet Take 1 tablet (10 mg) by mouth daily 90 tablet 3     hydrochlorothiazide (HYDRODIURIL) 25 MG tablet Take 1 tablet (25 mg) by mouth daily 90 tablet 3     Ibuprofen (ADVIL) 200 MG capsule Take 400 mg by mouth 2 times daily as needed.       lisinopril (PRINIVIL/ZESTRIL) 40 MG tablet Take 1 tablet (40 mg) by mouth daily 90 tablet 3     metFORMIN (GLUCOPHAGE) 1000 MG tablet Take 1 tablet (1,000 mg) by mouth 2  times daily (with meals) 180 tablet 3     sildenafil (VIAGRA) 100 MG cap/tab Take 1 tablet (100 mg) by mouth daily as needed for erectile dysfunction (Patient not taking: Reported on 1/13/2020) 3 tablet 11     vardenafil (LEVITRA) 20 MG tablet Take 1 tablet (20 mg) by mouth daily as needed for erectile dysfunction (Patient not taking: Reported on 1/13/2020) 5 tablet 11     No facility-administered encounter medications on file as of 7/27/2020.              Review Of Systems  Skin: As above  Eyes: negative  Ears/Nose/Throat: negative  Respiratory: No shortness of breath, dyspnea on exertion, cough, or hemoptysis  Cardiovascular: negative  Gastrointestinal: negative  Genitourinary: negative  Musculoskeletal: negative  Neurologic: negative  Psychiatric: negative  Hematologic/Lymphatic/Immunologic: negative  Endocrine: negative      O:   NAD, WDWN, Alert & Oriented, Mood & Affect wnl, Vitals stable   Here today alone   BP (!) 153/80   Pulse 81   SpO2 97%    General appearance normal   Vitals stable   Alert, oriented and in no acute distress     L sternal notch 1.3x1.4cm red plaque      Eyes: Conjunctivae/lids:Normal     ENT: Lips, buccal mucosa, tongue: normal    MSK:Normal    Cardiovascular: peripheral edema none    Pulm: Breathing Normal    Neuro/Psych: Orientation:Alert and Orientedx3 ; Mood/Affect:normal       A/P:  1. L sternal notch basal cell carcinoma   MOHS:   Size    The rationale for Mohs surgery was discussed with the patient and consent was obtained.  The risks and benefits as well as alternatives to therapy were discussed, in detail.  Specifically, the risks of infection, scarring, bleeding, prolonged wound healing, incomplete removal, allergy to anesthesia, nerve injury and recurrence were addressed.  Indication for Mohs was Size. Prior to the procedure, the treatment site was clearly identified and, if available, confirmed with previous photos and confirmed by the patient   All components of the  Universal Protocol/PAUSE rule were completed.  The Mohs surgeon operated in two distinct and integrated capacities as the surgeon and pathologist.      The area was prepped with Betasept.  A rim of normal appearing skin was marked circumferentially around the lesion.  The area was infiltrated with local anesthesia.  The tumor was first debulked to remove all clinically apparent tumor.  An incision following the standard Mohs approach was done and the specimen was oriented,mapped and placed in 1 block(s).  Each specimen was then chromacoded and processed in the Mohs laboratory using standard Mohs technique and submitted for frozen section histology.  Frozen section analysis showed no residual tumor but CLEAR MARGINS.      The tumor was excised using standard Mohs technique in 1 stages(s).  CLEAR MARGINS OBTAINED and Final defect size was 2.3 x 2.1 cm.     We discussed the options for wound management in full with the patient including risks/benefits/ possible outcomes.        REPAIR COMPLEX: Because of the tightness of the surrounding skin and Because of the size and full thickness nature of the defect, a complex closure was planned. After LEC anesthesia and prep, Burow's triangles were excised in the relaxed skin tension lines. The wound edges were widely undermined by dissection in the subcutaneous plane until adequate tissue mobility was obtained. Hemostasis was obtained. The wound edges were closed in a layered fashion using Vicryl and Fast Absorbing Plain Gut sutures. Postoperative length was 5 cm.   EBL minimal; complications none; wound care routine.  The patient was discharged in good condition and will return in one week for wound evaluation.  BENIGN LESIONS DISCUSSED WITH PATIENT:  I discussed the specifics of tumor, prognosis, and genetics of benign lesions.  I explained that treatment of these lesions would be purely cosmetic and not medically neccessary.  I discussed with patient different removal  options including excision, cautery and /or laser.      Nature and genetics of benign skin lesions dicussed with patient.  Signs and Symptoms of skin cancer discussed with patient.  Patient encouraged to perform monthly skin exams.  UV precautions reviewed with patient.  Skin care regimen reviewed with patient: Eliminate harsh soaps, i.e. Dial, zest, irsih spring; Mild soaps such as Cetaphil or Dove sensitive skin, avoid hot or cold showers, aggressive use of emollients including vanicream, cetaphil or cerave discussed with patient.    Risks of non-melanoma skin cancer discussed with patient   Return to clinic 6 months

## 2020-07-27 NOTE — PATIENT INSTRUCTIONS
Sutured Wound Care     [facility]: [phone]    [facility]: [phone]          ? No strenuous activity for 48 hours. Resume moderate activity in 48 hours. No heavy exercising until you are seen for follow up in one week.     ? Take Tylenol as needed for discomfort.                         ? Do not drink alcoholic beverages for 48 hours.     ? Keep the pressure bandage in place for 24 hours. If the bandage becomes blood tinged or loose, reinforce it with gauze and tape.        (Refer to the reverse side of this page for management of bleeding).    ? Remove pressure bandage in 24 hours     ? Leave the flat bandage in place until your follow up appointment.    ? Keep the bandage dry. Wash around it carefully.    ? If the tape becomes soiled or starts to come off, reinforce it with additional paper tape.    ? Do not smoke for 3 weeks; smoking is detrimental to wound healing.    ? It is normal to have swelling and bruising around the surgical site. The bruising will fade in approximately 10-14 days. Elevate the area to reduce swelling.    ? Numbness, itchiness and sensitivity to temperature changes can occur after surgery and may take up to 18 months to normalize.      POSSIBLE COMPLICATIONS    BLEEDIN. Leave the bandage in place.  2. Use tightly rolled up gauze or a cloth to apply direct pressure over the bandage for 20   minutes.  3. Reapply pressure for an additional 20 minutes if necessary  4. Call the office or go to the nearest emergency room if pressure fails to stop the bleeding.  5. Use additional gauze and tape to maintain pressure once the bleeding has stopped.        PAIN:    1. Post operative pain should slowly get better, never worse.  2. A severe increase in pain may indicate a problem. Call the office if this occurs.    In case of emergency phone:Dr Jarvis [phone]

## 2020-07-27 NOTE — LETTER
7/27/2020         RE: Cecilio Navarro  [address] [facility] [address]        Dear Colleague,    Thank you for referring your patient, Cecilio Navarro, to the [facility]. Please see a copy of my visit note below.    Surgical Office Location :   [facility]  [address] [place] [address]      Cecilio Navarro is a 80 year old year old male patient here today for evaluation and managment of basal cell carcinoma on left sternal notch.  Patient has no other skin complaints today.  Remainder of the HPI, Meds, PMH, Allergies, FH, and SH was reviewed in chart.      Past Medical History:   Diagnosis Date     Actinic keratoses      Type II or unspecified type diabetes mellitus without mention of complication, not stated as uncontrolled      Unspecified disorder of male genital organs      Unspecified essential hypertension        Past Surgical History:   Procedure Laterality Date     COLONOSCOPY  10/16/2013    Diverticulosis in sigmoid colon; repeat in 5 years; Surgeon: Garry Ambrose MD;  Location: [facility]     HC REMOVE TONSILS/ADENOIDS,<13 Y/O          Family History   Problem Relation Age of Onset     Hypertension Mother      Cerebrovascular Disease Mother         mentally affected     Cancer - colorectal Father         mets to liver  age76     Gastrointestinal Disease Brother         multilple colon polyps     Hypertension Brother      Gastrointestinal Disease Sister         reflux     Obesity Son      Allergies Daughter      Arthritis Brother      Melanoma No family hx of        Social History     Socioeconomic History     Marital status:      Spouse name: Not on file     Number of children: Not on file     Years of education: Not on file     Highest education level: Not on file   Occupational History     Not on file   Social Needs     Financial resource strain: Not on file     Food insecurity     Worry: Not on file     Inability: Not on file      Transportation needs     Medical: Not on file     Non-medical: Not on file   Tobacco Use     Smoking status: Never Smoker     Smokeless tobacco: Never Used   Substance and Sexual Activity     Alcohol use: Yes     Comment: occasional social     Drug use: No     Sexual activity: Yes     Partners: Female   Lifestyle     Physical activity     Days per week: Not on file     Minutes per session: Not on file     Stress: Not on file   Relationships     Social connections     Talks on phone: Not on file     Gets together: Not on file     Attends Baptism service: Not on file     Active member of club or organization: Not on file     Attends meetings of clubs or organizations: Not on file     Relationship status: Not on file     Intimate partner violence     Fear of current or ex partner: Not on file     Emotionally abused: Not on file     Physically abused: Not on file     Forced sexual activity: Not on file   Other Topics Concern      Service No     Blood Transfusions No     Caffeine Concern Yes     Comment: 1-2 day     Occupational Exposure No     Hobby Hazards Yes     Comment: [employer]     Sleep Concern No     Stress Concern No     Weight Concern No     Special Diet Yes     Comment: Diabetic and low fat     Back Care No     Exercise Yes     Bike Helmet Not Asked     Seat Belt Yes     Self-Exams Yes     Comment: check blood sugars     Parent/sibling w/ CABG, MI or angioplasty before 65F 55M? Not Asked   Social History Narrative     Not on file       Outpatient Encounter Medications as of 7/27/2020   Medication Sig Dispense Refill     aspirin 81 MG tablet Take 81 mg by mouth daily       atorvastatin (LIPITOR) 40 MG tablet Take 1 tablet (40 mg) by mouth daily 90 tablet 3     ciclopirox (LOPROX) 0.77 % cream Daily at bedtime to face 90 g 3     desonide (DESOWEN) 0.05 % external cream Every morning to face 60 g 3     glipiZIDE (GLUCOTROL XL) 10 MG 24 hr tablet Take 1 tablet (10 mg) by mouth daily 90 tablet 3      hydrochlorothiazide (HYDRODIURIL) 25 MG tablet Take 1 tablet (25 mg) by mouth daily 90 tablet 3     Ibuprofen (ADVIL) 200 MG capsule Take 400 mg by mouth 2 times daily as needed.       lisinopril (PRINIVIL/ZESTRIL) 40 MG tablet Take 1 tablet (40 mg) by mouth daily 90 tablet 3     metFORMIN (GLUCOPHAGE) 1000 MG tablet Take 1 tablet (1,000 mg) by mouth 2 times daily (with meals) 180 tablet 3     sildenafil (VIAGRA) 100 MG cap/tab Take 1 tablet (100 mg) by mouth daily as needed for erectile dysfunction (Patient not taking: Reported on 1/13/2020) 3 tablet 11     vardenafil (LEVITRA) 20 MG tablet Take 1 tablet (20 mg) by mouth daily as needed for erectile dysfunction (Patient not taking: Reported on 1/13/2020) 5 tablet 11     No facility-administered encounter medications on file as of 7/27/2020.              Review Of Systems  Skin: As above  Eyes: negative  Ears/Nose/Throat: negative  Respiratory: No shortness of breath, dyspnea on exertion, cough, or hemoptysis  Cardiovascular: negative  Gastrointestinal: negative  Genitourinary: negative  Musculoskeletal: negative  Neurologic: negative  Psychiatric: negative  Hematologic/Lymphatic/Immunologic: negative  Endocrine: negative      O:   NAD, WDWN, Alert & Oriented, Mood & Affect wnl, Vitals stable   Here today alone   BP (!) 153/80   Pulse 81   SpO2 97%    General appearance normal   Vitals stable   Alert, oriented and in no acute distress     L sternal notch 1.3x1.4cm red plaque      Eyes: Conjunctivae/lids:Normal     ENT: Lips, buccal mucosa, tongue: normal    MSK:Normal    Cardiovascular: peripheral edema none    Pulm: Breathing Normal    Neuro/Psych: Orientation:Alert and Orientedx3 ; Mood/Affect:normal       A/P:  1. L sternal notch basal cell carcinoma   MOHS:   Size    The rationale for Mohs surgery was discussed with the patient and consent was obtained.  The risks and benefits as well as alternatives to therapy were discussed, in detail.  Specifically, the  risks of infection, scarring, bleeding, prolonged wound healing, incomplete removal, allergy to anesthesia, nerve injury and recurrence were addressed.  Indication for Mohs was Size. Prior to the procedure, the treatment site was clearly identified and, if available, confirmed with previous photos and confirmed by the patient   All components of the Universal Protocol/PAUSE rule were completed.  The Mohs surgeon operated in two distinct and integrated capacities as the surgeon and pathologist.      The area was prepped with Betasept.  A rim of normal appearing skin was marked circumferentially around the lesion.  The area was infiltrated with local anesthesia.  The tumor was first debulked to remove all clinically apparent tumor.  An incision following the standard Mohs approach was done and the specimen was oriented,mapped and placed in 1 block(s).  Each specimen was then chromacoded and processed in the Mohs laboratory using standard Mohs technique and submitted for frozen section histology.  Frozen section analysis showed no residual tumor but CLEAR MARGINS.      The tumor was excised using standard Mohs technique in 1 stages(s).  CLEAR MARGINS OBTAINED and Final defect size was 2.3 x 2.1 cm.     We discussed the options for wound management in full with the patient including risks/benefits/ possible outcomes.        REPAIR COMPLEX: Because of the tightness of the surrounding skin and Because of the size and full thickness nature of the defect, a complex closure was planned. After LEC anesthesia and prep, Burow's triangles were excised in the relaxed skin tension lines. The wound edges were widely undermined by dissection in the subcutaneous plane until adequate tissue mobility was obtained. Hemostasis was obtained. The wound edges were closed in a layered fashion using Vicryl and Fast Absorbing Plain Gut sutures. Postoperative length was 5 cm.   EBL minimal; complications none; wound care routine.  The patient  was discharged in good condition and will return in one week for wound evaluation.  BENIGN LESIONS DISCUSSED WITH PATIENT:  I discussed the specifics of tumor, prognosis, and genetics of benign lesions.  I explained that treatment of these lesions would be purely cosmetic and not medically neccessary.  I discussed with patient different removal options including excision, cautery and /or laser.      Nature and genetics of benign skin lesions dicussed with patient.  Signs and Symptoms of skin cancer discussed with patient.  Patient encouraged to perform monthly skin exams.  UV precautions reviewed with patient.  Skin care regimen reviewed with patient: Eliminate harsh soaps, i.e. Dial, zest, irsih spring; Mild soaps such as Cetaphil or Dove sensitive skin, avoid hot or cold showers, aggressive use of emollients including vanicream, cetaphil or cerave discussed with patient.    Risks of non-melanoma skin cancer discussed with patient   Return to clinic 6 months      Again, thank you for allowing me to participate in the care of your patient.        Sincerely,        Tejinder Jarvis MD

## 2020-07-27 NOTE — NURSING NOTE
"Initial BP (!) 153/80   Pulse 81   SpO2 97%  Estimated body mass index is 32.42 kg/m  as calculated from the following:    Height as of 11/21/19: 1.702 m (5' 7\").    Weight as of 11/21/19: 93.9 kg (207 lb). .      "

## 2020-08-03 ENCOUNTER — OFFICE VISIT (OUTPATIENT)
Dept: DERMATOLOGY | Facility: CLINIC | Age: 80
End: 2020-08-03
Payer: COMMERCIAL

## 2020-08-03 VITALS — HEART RATE: 76 BPM | DIASTOLIC BLOOD PRESSURE: 84 MMHG | OXYGEN SATURATION: 97 % | SYSTOLIC BLOOD PRESSURE: 156 MMHG

## 2020-08-03 DIAGNOSIS — C44.319 BASAL CELL CARCINOMA (BCC) OF FOREHEAD: Primary | ICD-10-CM

## 2020-08-03 PROCEDURE — 17311 MOHS 1 STAGE H/N/HF/G: CPT | Mod: 79 | Performed by: DERMATOLOGY

## 2020-08-03 PROCEDURE — 13132 CMPLX RPR F/C/C/M/N/AX/G/H/F: CPT | Mod: 79 | Performed by: DERMATOLOGY

## 2020-08-03 NOTE — PATIENT INSTRUCTIONS
WOUND CARE INSTRUCTIONS  for  ONE WEEK AFTER SURGERY                  Left chest (and right forehead after you change your bandage)    1) Leave flat bandage on your skin for one week after today s bandage change.  2) In one week when you remove the bandage, you may resume your regular skin care routine, including washing with mild soap and water, applying moisturizer, make-up and sunscreen.    3) If there are any open or bleeding areas at the incision/graft site you should begin to cover the area with a bandage daily as follows:    1) Clean and dry the area with plain tap water using a Q-tip or sterile gauze pad.  2) Apply Polysporin or Bacitracin ointment to the open area.  3) Cover the wound with a band-aid or a sterile non-stick gauze pad and micropore paper tape.   SIGNS OF INFECTION  - If you notice any of these signs of infection, call your doctor right away: expanding redness around the wound.  - Yellow or greenish-colored pus or cloudy wound drainage.    - Red streaking spreading from the wound.  - Increased swelling, tenderness, or pain around the wound.   - Fever.    Please remember that yellow and clear drainage from a wound can be normal and related to normal wound healing.  Isolated drainage from a wound without a combination of the above features does not indicate infection.     *Once the bandages are removed, the scar will be red and firm (especially in the lip/chin area). This is normal and will fade in time. It might take 6-12 months for this to happen.     *Massaging the area will help the scar soften and fade quicker. Begin to massage the area one month after the bandages have been removed. To massage apply pressure directly and firmly over the scar with the fingertips and move in a circular motion. Massage the area for a few minutes several times a day. Continue to massage the site for several months.    *Approximately 6-8 weeks after surgery it is not uncommon to see the formation of  tender  pimple-like  bump along the scar. This is normal. As the scar continues to mature and the stitches underneath the skin begin to dissolve, this might occur. Do not pick or squeeze, this will resolve on it s own. Should one break open producing a small amount of drainage, apply Polysporin or Bacitracin ointment a few times a day until the wound is completely healed.    *Numbness in the surgical area is expected. It might take 12-18 months for the feeling to return to normal. During this time sensations of itchiness, tingling and occasional sharp pains might be noted. These feelings are normal and will subside once the nerves have completely healed.     Sutured Wound Care     [facility]: [phone]    [facility]: [phone]  Right Forehead  ? No strenuous activity for 48 hours. Resume moderate activity in 48 hours. No heavy exercising until you are seen for follow up in one week.     ? Take Tylenol as needed for discomfort.                         ? Do not drink alcoholic beverages for 48 hours.     ? Keep the pressure bandage in place for 24 hours. If the bandage becomes blood tinged or loose, reinforce it with gauze and tape.        (Refer to the reverse side of this page for management of bleeding).    ? Remove pressure bandage in 24 hours (white)    ? Leave the flat bandage in place until your follow up appointment. (brown)    ? Keep the bandage dry. Wash around it carefully.    ? If the tape becomes soiled or starts to come off, reinforce it with additional paper tape.    ? Do not smoke for 3 weeks; smoking is detrimental to wound healing.    ? It is normal to have swelling and bruising around the surgical site. The bruising will fade in approximately 10-14 days. Elevate the area to reduce swelling.    ? Numbness, itchiness and sensitivity to temperature changes can occur after surgery and may take up to 18 months to normalize.    POSSIBLE COMPLICATIONS    BLEEDIN. Leave the bandage  in place.  2. Use tightly rolled up gauze or a cloth to apply direct pressure over the bandage for 20   minutes.  3. Reapply pressure for an additional 20 minutes if necessary  4. Call the office or go to the nearest emergency room if pressure fails to stop the bleeding.  5. Use additional gauze and tape to maintain pressure once the bleeding has stopped.    PAIN:    1. Post operative pain should slowly get better, never worse.  2. A severe increase in pain may indicate a problem. Call the office if this occurs.    In case of emergency phone:Dr Jarvis [phone]      ONE WEEK AFTER SURGERY:  -Remove bandage on 8/10/20  -Clean and dry the area with plain tap water using a Q-tip or sterile gauze pad  -Reapply steri strips down incision   -Leave bandage in place for 1  week  -Remove bandage on 8/17/20

## 2020-08-03 NOTE — PROGRESS NOTES
Cecilio Navarro is a 80 year old year old male patient here today for evaluation and managment of basal cell carcinoma on right forehead. Patient has no other skin complaints today.  Remainder of the HPI, Meds, PMH, Allergies, FH, and SH was reviewed in chart.      Past Medical History:   Diagnosis Date     Actinic keratoses      Basal cell carcinoma      Type II or unspecified type diabetes mellitus without mention of complication, not stated as uncontrolled      Unspecified disorder of male genital organs      Unspecified essential hypertension        Past Surgical History:   Procedure Laterality Date     COLONOSCOPY  10/16/2013    Diverticulosis in sigmoid colon; repeat in 5 years; Surgeon: Garry Ambrose MD;  Location: [facility]     HC REMOVE TONSILS/ADENOIDS,<11 Y/O          Family History   Problem Relation Age of Onset     Hypertension Mother      Cerebrovascular Disease Mother         mentally affected     Cancer - colorectal Father         mets to liver  age74     Gastrointestinal Disease Brother         multilple colon polyps     Hypertension Brother      Gastrointestinal Disease Sister         reflux     Obesity Son      Allergies Daughter      Arthritis Brother      Melanoma No family hx of        Social History     Socioeconomic History     Marital status:      Spouse name: Not on file     Number of children: Not on file     Years of education: Not on file     Highest education level: Not on file   Occupational History     Not on file   Social Needs     Financial resource strain: Not on file     Food insecurity     Worry: Not on file     Inability: Not on file     Transportation needs     Medical: Not on file     Non-medical: Not on file   Tobacco Use     Smoking status: Never Smoker     Smokeless tobacco: Never Used   Substance and Sexual Activity     Alcohol use: Yes     Comment: occasional social     Drug use: No     Sexual activity: Yes     Partners: Female   Lifestyle     Physical  activity     Days per week: Not on file     Minutes per session: Not on file     Stress: Not on file   Relationships     Social connections     Talks on phone: Not on file     Gets together: Not on file     Attends [employer] service: Not on file     Active member of club or organization: Not on file     Attends meetings of clubs or organizations: Not on file     Relationship status: Not on file     Intimate partner violence     Fear of current or ex partner: Not on file     Emotionally abused: Not on file     Physically abused: Not on file     Forced sexual activity: Not on file   Other Topics Concern      Service No     Blood Transfusions No     Caffeine Concern Yes     Comment: 1-2 day     Occupational Exposure No     Hobby Hazards Yes     Comment: [employer]     Sleep Concern No     Stress Concern No     Weight Concern No     Special Diet Yes     Comment: Diabetic and low fat     Back Care No     Exercise Yes     Bike Helmet Not Asked     Seat Belt Yes     Self-Exams Yes     Comment: check blood sugars     Parent/sibling w/ CABG, MI or angioplasty before 65F 55M? Not Asked   Social History Narrative     Not on file       Outpatient Encounter Medications as of 8/3/2020   Medication Sig Dispense Refill     aspirin 81 MG tablet Take 81 mg by mouth daily       atorvastatin (LIPITOR) 40 MG tablet Take 1 tablet (40 mg) by mouth daily 90 tablet 3     ciclopirox (LOPROX) 0.77 % cream Daily at bedtime to face 90 g 3     desonide (DESOWEN) 0.05 % external cream Every morning to face 60 g 3     glipiZIDE (GLUCOTROL XL) 10 MG 24 hr tablet Take 1 tablet (10 mg) by mouth daily 90 tablet 3     hydrochlorothiazide (HYDRODIURIL) 25 MG tablet Take 1 tablet (25 mg) by mouth daily 90 tablet 3     Ibuprofen (ADVIL) 200 MG capsule Take 400 mg by mouth 2 times daily as needed.       lisinopril (PRINIVIL/ZESTRIL) 40 MG tablet Take 1 tablet (40 mg) by mouth daily 90 tablet 3     metFORMIN (GLUCOPHAGE) 1000 MG tablet Take 1 tablet  (1,000 mg) by mouth 2 times daily (with meals) 180 tablet 3     sildenafil (VIAGRA) 100 MG cap/tab Take 1 tablet (100 mg) by mouth daily as needed for erectile dysfunction 3 tablet 11     vardenafil (LEVITRA) 20 MG tablet Take 1 tablet (20 mg) by mouth daily as needed for erectile dysfunction 5 tablet 11     No facility-administered encounter medications on file as of 8/3/2020.              Review Of Systems  Skin: As above  Eyes: negative  Ears/Nose/Throat: negative  Respiratory: No shortness of breath, dyspnea on exertion, cough, or hemoptysis  Cardiovascular: negative  Gastrointestinal: negative  Genitourinary: negative  Musculoskeletal: negative  Neurologic: negative  Psychiatric: negative  Hematologic/Lymphatic/Immunologic: negative  Endocrine: negative      O:   NAD, WDWN, Alert & Oriented, Mood & Affect wnl, Vitals stable   Here today alone   BP (!) 156/84   Pulse 76   SpO2 97%    General appearance normal   Vitals stable   Alert, oriented and in no acute distress     R forehead 6mm scaly papule       Eyes: Conjunctivae/lids:Normal     ENT: Lips, buccal mucosa, tongue: normal    MSK:Normal    Cardiovascular: peripheral edema none    Pulm: Breathing Normal    Neuro/Psych: Orientation:Alert and Orientedx3 ; Mood/Affect:normal       A/P:  1. R forehead basal cell carcinoma   MOHS:   Location    The rationale for Mohs surgery was discussed with the patient and consent was obtained.  The risks and benefits as well as alternatives to therapy were discussed, in detail.  Specifically, the risks of infection, scarring, bleeding, prolonged wound healing, incomplete removal, allergy to anesthesia, nerve injury and recurrence were addressed.  Indication for Mohs was Location. Prior to the procedure, the treatment site was clearly identified and, if available, confirmed with previous photos and confirmed by the patient   All components of the Universal Protocol/PAUSE rule were completed.  The Mohs surgeon operated in  two distinct and integrated capacities as the surgeon and pathologist.      The area was prepped with Betasept.  A rim of normal appearing skin was marked circumferentially around the lesion.  The area was infiltrated with local anesthesia.  The tumor was first debulked to remove all clinically apparent tumor.  An incision following the standard Mohs approach was done and the specimen was oriented,mapped and placed in 1 block(s).  Each specimen was then chromacoded and processed in the Mohs laboratory using standard Mohs technique and submitted for frozen section histology.  Frozen section analysis showed no residual tumor but CLEAR MARGINS.      The tumor was excised using standard Mohs technique in 1 stages(s).  CLEAR MARGINS OBTAINED and Final defect size was 1.1 x 1.3 cm.     We discussed the options for wound management in full with the patient including risks/benefits/ possible outcomes.        REPAIR COMPLEX: Because of the tightness of the surrounding skin and Because of the size and full thickness nature of the defect, a complex closure was planned. After LEC anesthesia and prep, Burow's triangles were excised in the relaxed skin tension lines. The wound edges were widely undermined by dissection in the subcutaneous plane until adequate tissue mobility was obtained. Hemostasis was obtained. The wound edges were closed in a layered fashion using Vicryl and Fast Absorbing Plain Gut sutures. Postoperative length was 3.7 cm.   EBL minimal; complications none; wound care routine.  The patient was discharged in good condition and will return in one week for wound evaluation.  BENIGN LESIONS DISCUSSED WITH PATIENT:  I discussed the specifics of tumor, prognosis, and genetics of benign lesions.  I explained that treatment of these lesions would be purely cosmetic and not medically neccessary.  I discussed with patient different removal options including excision, cautery and /or laser.      Nature and genetics of  benign skin lesions dicussed with patient.  Signs and Symptoms of skin cancer discussed with patient.  Patient encouraged to perform monthly skin exams.  UV precautions reviewed with patient.  Skin care regimen reviewed with patient: Eliminate harsh soaps, i.e. Dial, zest, irsih spring; Mild soaps such as Cetaphil or Dove sensitive skin, avoid hot or cold showers, aggressive use of emollients including vanicream, cetaphil or cerave discussed with patient.    Risks of non-melanoma skin cancer discussed with patient   Return to clinic 6 months

## 2020-08-03 NOTE — LETTER
8/3/2020         RE: Cecilio Navarro  [address] [facility] [id number]        Dear Colleague,    Thank you for referring your patient, Cecilio Navarro, to the [facility]. Please see a copy of my visit note below.    Surgical Office Location :   [facility]  [address] [place] [address]      Cecilio Navarro is a 80 year old year old male patient here today for evaluation and managment of basal cell carcinoma on right forehead. Patient has no other skin complaints today.  Remainder of the HPI, Meds, PMH, Allergies, FH, and SH was reviewed in chart.      Past Medical History:   Diagnosis Date     Actinic keratoses      Basal cell carcinoma      Type II or unspecified type diabetes mellitus without mention of complication, not stated as uncontrolled      Unspecified disorder of male genital organs      Unspecified essential hypertension        Past Surgical History:   Procedure Laterality Date     COLONOSCOPY  10/16/2013    Diverticulosis in sigmoid colon; repeat in 5 years; Surgeon: Garry Ambrose MD;  Location: [facility]     HC REMOVE TONSILS/ADENOIDS,<11 Y/O          Family History   Problem Relation Age of Onset     Hypertension Mother      Cerebrovascular Disease Mother         mentally affected     Cancer - colorectal Father         mets to liver  age76     Gastrointestinal Disease Brother         multilple colon polyps     Hypertension Brother      Gastrointestinal Disease Sister         reflux     Obesity Son      Allergies Daughter      Arthritis Brother      Melanoma No family hx of        Social History     Socioeconomic History     Marital status:      Spouse name: Not on file     Number of children: Not on file     Years of education: Not on file     Highest education level: Not on file   Occupational History     Not on file   Social Needs     Financial resource strain: Not on file     Food insecurity     Worry: Not on file     Inability: Not  on file     Transportation needs     Medical: Not on file     Non-medical: Not on file   Tobacco Use     Smoking status: Never Smoker     Smokeless tobacco: Never Used   Substance and Sexual Activity     Alcohol use: Yes     Comment: occasional social     Drug use: No     Sexual activity: Yes     Partners: Female   Lifestyle     Physical activity     Days per week: Not on file     Minutes per session: Not on file     Stress: Not on file   Relationships     Social connections     Talks on phone: Not on file     Gets together: Not on file     Attends Confucianist service: Not on file     Active member of club or organization: Not on file     Attends meetings of clubs or organizations: Not on file     Relationship status: Not on file     Intimate partner violence     Fear of current or ex partner: Not on file     Emotionally abused: Not on file     Physically abused: Not on file     Forced sexual activity: Not on file   Other Topics Concern      Service No     Blood Transfusions No     Caffeine Concern Yes     Comment: 1-2 day     Occupational Exposure No     Hobby Hazards Yes     Comment: [employer]     Sleep Concern No     Stress Concern No     Weight Concern No     Special Diet Yes     Comment: Diabetic and low fat     Back Care No     Exercise Yes     Bike Helmet Not Asked     Seat Belt Yes     Self-Exams Yes     Comment: check blood sugars     Parent/sibling w/ CABG, MI or angioplasty before 65F 55M? Not Asked   Social History Narrative     Not on file       Outpatient Encounter Medications as of 8/3/2020   Medication Sig Dispense Refill     aspirin 81 MG tablet Take 81 mg by mouth daily       atorvastatin (LIPITOR) 40 MG tablet Take 1 tablet (40 mg) by mouth daily 90 tablet 3     ciclopirox (LOPROX) 0.77 % cream Daily at bedtime to face 90 g 3     desonide (DESOWEN) 0.05 % external cream Every morning to face 60 g 3     glipiZIDE (GLUCOTROL XL) 10 MG 24 hr tablet Take 1 tablet (10 mg) by mouth daily 90 tablet 3      hydrochlorothiazide (HYDRODIURIL) 25 MG tablet Take 1 tablet (25 mg) by mouth daily 90 tablet 3     Ibuprofen (ADVIL) 200 MG capsule Take 400 mg by mouth 2 times daily as needed.       lisinopril (PRINIVIL/ZESTRIL) 40 MG tablet Take 1 tablet (40 mg) by mouth daily 90 tablet 3     metFORMIN (GLUCOPHAGE) 1000 MG tablet Take 1 tablet (1,000 mg) by mouth 2 times daily (with meals) 180 tablet 3     sildenafil (VIAGRA) 100 MG cap/tab Take 1 tablet (100 mg) by mouth daily as needed for erectile dysfunction 3 tablet 11     vardenafil (LEVITRA) 20 MG tablet Take 1 tablet (20 mg) by mouth daily as needed for erectile dysfunction 5 tablet 11     No facility-administered encounter medications on file as of 8/3/2020.              Review Of Systems  Skin: As above  Eyes: negative  Ears/Nose/Throat: negative  Respiratory: No shortness of breath, dyspnea on exertion, cough, or hemoptysis  Cardiovascular: negative  Gastrointestinal: negative  Genitourinary: negative  Musculoskeletal: negative  Neurologic: negative  Psychiatric: negative  Hematologic/Lymphatic/Immunologic: negative  Endocrine: negative      O:   NAD, WDWN, Alert & Oriented, Mood & Affect wnl, Vitals stable   Here today alone   BP (!) 156/84   Pulse 76   SpO2 97%    General appearance normal   Vitals stable   Alert, oriented and in no acute distress     R forehead 6mm scaly papule       Eyes: Conjunctivae/lids:Normal     ENT: Lips, buccal mucosa, tongue: normal    MSK:Normal    Cardiovascular: peripheral edema none    Pulm: Breathing Normal    Neuro/Psych: Orientation:Alert and Orientedx3 ; Mood/Affect:normal       A/P:  1. R forehead basal cell carcinoma   MOHS:   Location    The rationale for Mohs surgery was discussed with the patient and consent was obtained.  The risks and benefits as well as alternatives to therapy were discussed, in detail.  Specifically, the risks of infection, scarring, bleeding, prolonged wound healing, incomplete removal, allergy to  anesthesia, nerve injury and recurrence were addressed.  Indication for Mohs was Location. Prior to the procedure, the treatment site was clearly identified and, if available, confirmed with previous photos and confirmed by the patient   All components of the Universal Protocol/PAUSE rule were completed.  The Mohs surgeon operated in two distinct and integrated capacities as the surgeon and pathologist.      The area was prepped with Betasept.  A rim of normal appearing skin was marked circumferentially around the lesion.  The area was infiltrated with local anesthesia.  The tumor was first debulked to remove all clinically apparent tumor.  An incision following the standard Mohs approach was done and the specimen was oriented,mapped and placed in 1 block(s).  Each specimen was then chromacoded and processed in the Mohs laboratory using standard Mohs technique and submitted for frozen section histology.  Frozen section analysis showed no residual tumor but CLEAR MARGINS.      The tumor was excised using standard Mohs technique in 1 stages(s).  CLEAR MARGINS OBTAINED and Final defect size was 1.1 x 1.3 cm.     We discussed the options for wound management in full with the patient including risks/benefits/ possible outcomes.        REPAIR COMPLEX: Because of the tightness of the surrounding skin and Because of the size and full thickness nature of the defect, a complex closure was planned. After LEC anesthesia and prep, Burow's triangles were excised in the relaxed skin tension lines. The wound edges were widely undermined by dissection in the subcutaneous plane until adequate tissue mobility was obtained. Hemostasis was obtained. The wound edges were closed in a layered fashion using Vicryl and Fast Absorbing Plain Gut sutures. Postoperative length was 3.7 cm.   EBL minimal; complications none; wound care routine.  The patient was discharged in good condition and will return in one week for wound evaluation.  BENIGN  LESIONS DISCUSSED WITH PATIENT:  I discussed the specifics of tumor, prognosis, and genetics of benign lesions.  I explained that treatment of these lesions would be purely cosmetic and not medically neccessary.  I discussed with patient different removal options including excision, cautery and /or laser.      Nature and genetics of benign skin lesions dicussed with patient.  Signs and Symptoms of skin cancer discussed with patient.  Patient encouraged to perform monthly skin exams.  UV precautions reviewed with patient.  Skin care regimen reviewed with patient: Eliminate harsh soaps, i.e. Dial, zest, irsih spring; Mild soaps such as Cetaphil or Dove sensitive skin, avoid hot or cold showers, aggressive use of emollients including vanicream, cetaphil or cerave discussed with patient.    Risks of non-melanoma skin cancer discussed with patient   Return to clinic 6 months      Again, thank you for allowing me to participate in the care of your patient.        Sincerely,        Tejinder Jarvis MD

## 2020-08-03 NOTE — NURSING NOTE
"Initial BP (!) 156/84   Pulse 76   SpO2 97%  Estimated body mass index is 32.42 kg/m  as calculated from the following:    Height as of 11/21/19: 1.702 m (5' 7\").    Weight as of 11/21/19: 93.9 kg (207 lb). .      "

## 2020-08-10 ENCOUNTER — ALLIED HEALTH/NURSE VISIT (OUTPATIENT)
Dept: DERMATOLOGY | Facility: CLINIC | Age: 80
End: 2020-08-10
Payer: COMMERCIAL

## 2020-08-10 DIAGNOSIS — Z48.01 ENCOUNTER FOR CHANGE OR REMOVAL OF SURGICAL WOUND DRESSING: Primary | ICD-10-CM

## 2020-08-10 PROCEDURE — 99207 ZZC NO CHARGE NURSE ONLY: CPT

## 2020-08-10 NOTE — PROGRESS NOTES
Pt returned to clinic for post surgery 1 week follow up bandage change. Pt has no complaints, denies pain. Bandage removed from right forehead, area cleansed with normal saline. Site is healing and wound edges approximating well. Reapplied new steri strips and paper tape.    Advised to watch for signs/sx of infection; spreading redness, drainage, odor, fever. Call or report promptly to clinic. Pt given written instructions and informed to rtc as needed. Patient verbalized understanding.     Tamera Martniez, CMA

## 2020-08-10 NOTE — PATIENT INSTRUCTIONS

## 2020-09-16 ENCOUNTER — OFFICE VISIT (OUTPATIENT)
Dept: FAMILY MEDICINE | Facility: CLINIC | Age: 80
End: 2020-09-16
Payer: COMMERCIAL

## 2020-09-16 VITALS
HEART RATE: 82 BPM | SYSTOLIC BLOOD PRESSURE: 158 MMHG | TEMPERATURE: 98.2 F | DIASTOLIC BLOOD PRESSURE: 84 MMHG | HEIGHT: 67 IN | RESPIRATION RATE: 20 BRPM | BODY MASS INDEX: 33.59 KG/M2 | OXYGEN SATURATION: 98 % | WEIGHT: 214 LBS

## 2020-09-16 DIAGNOSIS — I10 HTN, GOAL BELOW 140/90: ICD-10-CM

## 2020-09-16 DIAGNOSIS — E11.21 TYPE 2 DIABETES MELLITUS WITH DIABETIC NEPHROPATHY, WITHOUT LONG-TERM CURRENT USE OF INSULIN (H): ICD-10-CM

## 2020-09-16 DIAGNOSIS — E78.5 HYPERLIPIDEMIA LDL GOAL <100: ICD-10-CM

## 2020-09-16 DIAGNOSIS — Z13.220 SCREENING FOR HYPERLIPIDEMIA: ICD-10-CM

## 2020-09-16 DIAGNOSIS — Z23 NEED FOR PROPHYLACTIC VACCINATION AND INOCULATION AGAINST INFLUENZA: Primary | ICD-10-CM

## 2020-09-16 LAB
CHOLEST SERPL-MCNC: 109 MG/DL
CREAT UR-MCNC: 143 MG/DL
HBA1C MFR BLD: 9.8 % (ref 0–5.6)
HDLC SERPL-MCNC: 43 MG/DL
LDLC SERPL CALC-MCNC: 35 MG/DL
MICROALBUMIN UR-MCNC: 43 MG/L
MICROALBUMIN/CREAT UR: 30 MG/G CR (ref 0–17)
NONHDLC SERPL-MCNC: 66 MG/DL
TRIGL SERPL-MCNC: 154 MG/DL

## 2020-09-16 PROCEDURE — 83036 HEMOGLOBIN GLYCOSYLATED A1C: CPT | Performed by: FAMILY MEDICINE

## 2020-09-16 PROCEDURE — G0008 ADMIN INFLUENZA VIRUS VAC: HCPCS | Performed by: FAMILY MEDICINE

## 2020-09-16 PROCEDURE — 80061 LIPID PANEL: CPT | Performed by: FAMILY MEDICINE

## 2020-09-16 PROCEDURE — 99214 OFFICE O/P EST MOD 30 MIN: CPT | Mod: 25 | Performed by: FAMILY MEDICINE

## 2020-09-16 PROCEDURE — 90662 IIV NO PRSV INCREASED AG IM: CPT | Performed by: FAMILY MEDICINE

## 2020-09-16 PROCEDURE — 82043 UR ALBUMIN QUANTITATIVE: CPT | Performed by: FAMILY MEDICINE

## 2020-09-16 PROCEDURE — 36415 COLL VENOUS BLD VENIPUNCTURE: CPT | Performed by: FAMILY MEDICINE

## 2020-09-16 RX ORDER — ATORVASTATIN CALCIUM 40 MG/1
40 TABLET, FILM COATED ORAL DAILY
Qty: 90 TABLET | Refills: 3 | Status: SHIPPED | OUTPATIENT
Start: 2020-09-16 | End: 2021-09-22

## 2020-09-16 RX ORDER — METOPROLOL SUCCINATE 50 MG/1
50 TABLET, EXTENDED RELEASE ORAL DAILY
Qty: 90 TABLET | Refills: 3 | Status: SHIPPED | OUTPATIENT
Start: 2020-09-16 | End: 2021-09-22

## 2020-09-16 RX ORDER — LISINOPRIL 40 MG/1
40 TABLET ORAL DAILY
Qty: 90 TABLET | Refills: 3 | Status: SHIPPED | OUTPATIENT
Start: 2020-09-16 | End: 2021-09-22

## 2020-09-16 RX ORDER — GLIPIZIDE 10 MG/1
10 TABLET, FILM COATED, EXTENDED RELEASE ORAL DAILY
Qty: 90 TABLET | Refills: 3 | Status: SHIPPED | OUTPATIENT
Start: 2020-09-16 | End: 2021-09-22

## 2020-09-16 RX ORDER — HYDROCHLOROTHIAZIDE 25 MG/1
25 TABLET ORAL DAILY
Qty: 90 TABLET | Refills: 3 | Status: SHIPPED | OUTPATIENT
Start: 2020-09-16 | End: 2021-09-22

## 2020-09-16 ASSESSMENT — MIFFLIN-ST. JEOR: SCORE: 1641.39

## 2020-09-16 NOTE — PROGRESS NOTES
"Subjective     Cecilio Navarro is a 80 year old male who presents to clinic today for the following health issues:    HPI       Diabetes Follow-up      How often are you checking your blood sugar? Not at all    What concerns do you have today about your diabetes? None     Do you have any of these symptoms? (Select all that apply)  No numbness or tingling in feet.  No redness, sores or blisters on feet.  No complaints of excessive thirst.  No reports of blurry vision.  No significant changes to weight.    Have you had a diabetic eye exam in the last 12 months? No                Hyperlipidemia Follow-Up      Are you regularly taking any medication or supplement to lower your cholesterol?   Yes- Lipitor    Are you having muscle aches or other side effects that you think could be caused by your cholesterol lowering medication?  No    Hypertension Follow-up      Do you check your blood pressure regularly outside of the clinic? No     Are you following a low salt diet? No    Are your blood pressures ever more than 140 on the top number (systolic) OR more   than 90 on the bottom number (diastolic), for example 140/90? No    BP Readings from Last 2 Encounters:   09/16/20 (!) 158/84   08/03/20 (!) 156/84     Hemoglobin A1C (%)   Date Value   11/07/2019 7.6 (H)   05/20/2019 7.9 (H)     LDL Cholesterol Calculated (mg/dL)   Date Value   04/24/2018 50   01/20/2016 39       DM2: meds.  Worse.  9.8 on A1C.  Needs long acting insulin.  Mild albumin in urine.  Recheck      Htn: not controlled.  Willing to add toprol to ace and hydrochlorothiazide.     Hyperlipidemia: statin    Problem list, med list, additional histories reviewed and updated, as indicated.      No cp or sob    O:BP (!) 158/84 (BP Location: Left arm)   Pulse 82   Temp 98.2  F (36.8  C) (Tympanic)   Resp 20   Ht 1.689 m (5' 6.5\")   Wt 97.1 kg (214 lb)   SpO2 98%   BMI 34.02 kg/m    GEN: Alert and oriented, in no acute distress  CV: RRR, no murmur  RESP: lungs " clear bilaterally, good effort  EXT: no edema or lesions noted in lower extremities    A: DM2: worse, recheck nephropathy as well   Htn: not controlled.    Hyperlipidemia: statin, stable    P: add toprol.  DM edu for long acting insulin.  Work on diet.  F/u in 3 months for recheck on sugars, BP.  See what rest of labs show as well.     Weight management plan: diet/exercise

## 2020-09-16 NOTE — NURSING NOTE
"Chief Complaint   Patient presents with     Diabetes     recheck       Initial BP (!) 158/84 (BP Location: Left arm)   Pulse 82   Temp 98.2  F (36.8  C) (Tympanic)   Resp 20   Ht 1.689 m (5' 6.5\")   Wt 97.1 kg (214 lb)   SpO2 98%   BMI 34.02 kg/m   Estimated body mass index is 34.02 kg/m  as calculated from the following:    Height as of this encounter: 1.689 m (5' 6.5\").    Weight as of this encounter: 97.1 kg (214 lb).    Patient presents to the clinic using No DME    Health Maintenance that is potentially due pending provider review:  NONE    n/a    Is there anyone who you would like to be able to receive your results? No  If yes have patient fill out LEVAR    "

## 2020-10-07 ENCOUNTER — ALLIED HEALTH/NURSE VISIT (OUTPATIENT)
Dept: EDUCATION SERVICES | Facility: CLINIC | Age: 80
End: 2020-10-07
Payer: COMMERCIAL

## 2020-10-07 VITALS — BODY MASS INDEX: 34.15 KG/M2 | WEIGHT: 214.8 LBS

## 2020-10-07 DIAGNOSIS — E11.21 TYPE 2 DIABETES MELLITUS WITH DIABETIC NEPHROPATHY, WITHOUT LONG-TERM CURRENT USE OF INSULIN (H): Primary | ICD-10-CM

## 2020-10-07 PROCEDURE — 99207 PR DROP WITH A PROCEDURE: CPT | Performed by: DIETITIAN, REGISTERED

## 2020-10-07 PROCEDURE — G0108 DIAB MANAGE TRN  PER INDIV: HCPCS | Performed by: DIETITIAN, REGISTERED

## 2020-10-07 NOTE — PATIENT INSTRUCTIONS
1.  Watch carbohydrate choices 3 at meals, 1 choice at snacks.      2.  Stay active, record how much you are doing.

## 2020-10-07 NOTE — PROGRESS NOTES
"Diabetes Self-Management Education & Support    Presents for: Individual review    SUBJECTIVE/OBJECTIVE:  Presents for: Individual review  Diabetes type: Type 2  Cultural Influences/Ethnic Background:  American      Diabetes Symptoms & Complications:          Patient Problem List and Family Medical History reviewed for relevant medical history, current medical status, and diabetes risk factors.    Vitals:  Wt 97.4 kg (214 lb 12.8 oz)   BMI 34.15 kg/m    Estimated body mass index is 34.15 kg/m  as calculated from the following:    Height as of 9/16/20: 1.689 m (5' 6.5\").    Weight as of this encounter: 97.4 kg (214 lb 12.8 oz).   Last 3 BP:   BP Readings from Last 3 Encounters:   09/16/20 (!) 158/84   08/03/20 (!) 156/84   07/27/20 (!) 153/80       History   Smoking Status     Never Smoker   Smokeless Tobacco     Never Used       Labs:  Lab Results   Component Value Date    A1C 9.8 09/16/2020     Lab Results   Component Value Date     11/07/2019     Lab Results   Component Value Date    LDL 35 09/16/2020     HDL Cholesterol   Date Value Ref Range Status   09/16/2020 43 >39 mg/dL Final   ]  GFR Estimate   Date Value Ref Range Status   11/07/2019 82 >60 mL/min/[1.73_m2] Final     Comment:     Non  GFR Calc  Starting 12/18/2018, serum creatinine based estimated GFR (eGFR) will be   calculated using the Chronic Kidney Disease Epidemiology Collaboration   (CKD-EPI) equation.       GFR Estimate If Black   Date Value Ref Range Status   11/07/2019 >90 >60 mL/min/[1.73_m2] Final     Comment:      GFR Calc  Starting 12/18/2018, serum creatinine based estimated GFR (eGFR) will be   calculated using the Chronic Kidney Disease Epidemiology Collaboration   (CKD-EPI) equation.       Lab Results   Component Value Date    CR 0.90 11/07/2019     No results found for: MICROALBUMIN    Healthy Eating:  Healthy Eating Assessed Today: Yes  Breakfast: cereal special k with berries and other fruit or " oatmeal or eggs 2 eggs, toast  Lunch: chicken noodle soup with 2-3 crackers, sandwich or leftovers.  fariba and fries and ruiz.  Dinner: pc of apple pie no ice cream  Snacks: popcorn, grapes    Being Active:  Exercise:: Yes(stays busy all the time.)    Monitoring:   He plans to have an a1c again in three months, does not want to test  BG levels    Taking Medications:  Diabetes Medication(s)     Biguanides       metFORMIN (GLUCOPHAGE) 1000 MG tablet    Take 1 tablet (1,000 mg) by mouth 2 times daily (with meals)    Sulfonylureas       glipiZIDE (GLUCOTROL XL) 10 MG 24 hr tablet    Take 1 tablet (10 mg) by mouth daily               Problem Solving:   not assessed              Reducing Risks:   not assessed    Healthy Coping:     Patient Activation Measure Survey Score:  REMINGTON Score (Last Two) 11/15/2010   REMINGTON Raw Score 52   Activation Score 100   REMINGTON Level 4       Diabetes knowledge and skills assessment:   Patient is knowledgeable in diabetes management concepts related to: Healthy Eating, Being Active, Monitoring and Taking Medication    Patient needs further education on the following diabetes management concepts: Healthy Eating, Being Active, Monitoring, Taking Medication, Problem Solving, Reducing Risks and Healthy Coping    Based on learning assessment above, most appropriate setting for further diabetes education would be: Individual setting.      INTERVENTIONS:    Education provided today on:  AADE Self-Care Behaviors:  Healthy Eating: carbohydrate counting, consistency in amount, composition, and timing of food intake and label reading  Being Active: relationship to blood glucose and describe appropriate activity program    Opportunities for ongoing education and support in diabetes-self management were discussed.    Pt verbalized understanding of concepts discussed and recommendations provided today.       Education Materials Provided:  Carbohydrate Counting      ASSESSMENT:  His A1c went up to 9.8%, he is  going to work hard on meal plan and activity and get it back down.  He plans to test a1c again in three months.  Gave him meal plan and encouraged less out to eat and try to split meals as an idea or make choices that are lower calorie: like chicken, fish, or pork chop. Only eat 1/2 potato and order veggies or salad with it.  He will record food and activity, recheck in one month to assess.        Patient's most recent   Lab Results   Component Value Date    A1C 9.8 09/16/2020    is not meeting goal of <8.0    PLAN  See Patient Instructions for co-developed, patient-stated behavior change goals.  AVS printed and provided to patient today. See Follow-Up section for recommended follow-up.      Time Spent: 60 minutes  Encounter Type: Individual    Any diabetes medication dose changes were made via the CDE Protocol and Collaborative Practice Agreement with the patient's primary care provider. A copy of this encounter was shared with the provider.

## 2020-11-18 ENCOUNTER — ALLIED HEALTH/NURSE VISIT (OUTPATIENT)
Dept: EDUCATION SERVICES | Facility: CLINIC | Age: 80
End: 2020-11-18
Payer: COMMERCIAL

## 2020-11-18 VITALS — WEIGHT: 215 LBS | BODY MASS INDEX: 34.18 KG/M2

## 2020-11-18 DIAGNOSIS — E11.21 TYPE 2 DIABETES MELLITUS WITH DIABETIC NEPHROPATHY, WITHOUT LONG-TERM CURRENT USE OF INSULIN (H): Primary | ICD-10-CM

## 2020-11-18 PROCEDURE — G0108 DIAB MANAGE TRN  PER INDIV: HCPCS | Performed by: DIETITIAN, REGISTERED

## 2020-11-18 NOTE — PROGRESS NOTES
"Diabetes Self-Management Education & Support    Presents for: Individual review    SUBJECTIVE/OBJECTIVE:  Presents for: Individual review  Diabetes type: Type 2  Cultural Influences/Ethnic Background:  American      Diabetes Symptoms & Complications:          Patient Problem List and Family Medical History reviewed for relevant medical history, current medical status, and diabetes risk factors.    Vitals:  Wt 97.5 kg (215 lb)   BMI 34.18 kg/m    Estimated body mass index is 34.18 kg/m  as calculated from the following:    Height as of 9/16/20: 1.689 m (5' 6.5\").    Weight as of this encounter: 97.5 kg (215 lb).   Last 3 BP:   BP Readings from Last 3 Encounters:   09/16/20 (!) 158/84   08/03/20 (!) 156/84   07/27/20 (!) 153/80       History   Smoking Status     Never Smoker   Smokeless Tobacco     Never Used       Labs:  Lab Results   Component Value Date    A1C 9.8 09/16/2020     Lab Results   Component Value Date     11/07/2019     Lab Results   Component Value Date    LDL 35 09/16/2020     HDL Cholesterol   Date Value Ref Range Status   09/16/2020 43 >39 mg/dL Final   ]  GFR Estimate   Date Value Ref Range Status   11/07/2019 82 >60 mL/min/[1.73_m2] Final     Comment:     Non  GFR Calc  Starting 12/18/2018, serum creatinine based estimated GFR (eGFR) will be   calculated using the Chronic Kidney Disease Epidemiology Collaboration   (CKD-EPI) equation.       GFR Estimate If Black   Date Value Ref Range Status   11/07/2019 >90 >60 mL/min/[1.73_m2] Final     Comment:      GFR Calc  Starting 12/18/2018, serum creatinine based estimated GFR (eGFR) will be   calculated using the Chronic Kidney Disease Epidemiology Collaboration   (CKD-EPI) equation.       Lab Results   Component Value Date    CR 0.90 11/07/2019     No results found for: MICROALBUMIN    Healthy Eating:  Healthy Eating Assessed Today: Yes  Breakfast: cereal special k with berries and other fruit or oatmeal or eggs 2 " eggs, toast  Lunch: chicken noodle soup with 2-3 crackers, sandwich or leftovers.  Dinner: salad or meat, veggies  Snacks: popcorn, grapes    Being Active:  Exercise:: Yes(stays busy all the time.  walk dog and walk in [facility])    Monitoring:   not testing his blood sugars        Taking Medications:  Diabetes Medication(s)     Biguanides       metFORMIN (GLUCOPHAGE) 1000 MG tablet    Take 1 tablet (1,000 mg) by mouth 2 times daily (with meals)    Sulfonylureas       glipiZIDE (GLUCOTROL XL) 10 MG 24 hr tablet    Take 1 tablet (10 mg) by mouth daily               Problem Solving:   not assessed              Reducing Risks:   not assessed    Healthy Coping:     Patient Activation Measure Survey Score:  REMINGTON Score (Last Two) 11/15/2010   REMINGTON Raw Score 52   Activation Score 100   REMINGTON Level 4       Diabetes knowledge and skills assessment:   Patient is knowledgeable in diabetes management concepts related to: Healthy Eating, Being Active, Monitoring and Taking Medication    Patient needs further education on the following diabetes management concepts: Healthy Eating, Being Active, Monitoring, Taking Medication, Problem Solving, Reducing Risks and Healthy Coping    Based on learning assessment above, most appropriate setting for further diabetes education would be: Individual setting.      INTERVENTIONS:    Education provided today on:  AADE Self-Care Behaviors:  Healthy Eating: consistency in amount, composition, and timing of food intake and portion control  Being Active: relationship to blood glucose and describe appropriate activity program  Monitoring: pt does not want to test BG levels, his BG is high, last a1c was over 9.  IF still high this next a1c he will be willing to test and we also discussed ozempic.  Taking Medication: discussed ozempic    Opportunities for ongoing education and support in diabetes-self management were discussed.    Pt verbalized understanding of concepts discussed and recommendations  provided today.       Education Materials Provided:  No new materials provided today      ASSESSMENT:  Last a1c higher, he has been working on meal plan some he stated. Wt is still the same.  Was eating some halloween candy.  Feels he can do it again with diet and activity.  I did discuss how diabetes progresses over time.  Also discussed GLP1 medication that I think would be a good next step if a1c is still high at physical in December.  He will try to do the meat and veggie at supper and watch portions on his fruits.        Patient's most recent   Lab Results   Component Value Date    A1C 9.8 09/16/2020    is not meeting goal of <7.0    PLAN  See Patient Instructions for co-developed, patient-stated behavior change goals.  AVS printed and provided to patient today. See Follow-Up section for recommended follow-up.      Time Spent: 60 minutes  Encounter Type: Individual    Any diabetes medication dose changes were made via the CDE Protocol and Collaborative Practice Agreement with the patient's primary care provider. A copy of this encounter was shared with the provider.

## 2020-11-18 NOTE — PATIENT INSTRUCTIONS
1.  Work on a meat and veggie with supper    2.  Stay active, try to walk daily    3.  Avoid the candy and portion the grapes

## 2020-12-28 ENCOUNTER — TELEPHONE (OUTPATIENT)
Dept: FAMILY MEDICINE | Facility: CLINIC | Age: 80
End: 2020-12-28

## 2020-12-28 NOTE — TELEPHONE ENCOUNTER
Patient Quality Outreach      Summary:    Patient has the following on his problem list/HM:     Diabetes    Last A1C:  Lab Results   Component Value Date    A1C 9.8 09/16/2020    A1C 7.6 11/07/2019       Last LDL:    Lab Results   Component Value Date    LDL 35 09/16/2020       Is the patient on a Statin? Yes          Is the patient on Aspirin? No    Medications     HMG CoA Reductase Inhibitors     atorvastatin (LIPITOR) 40 MG tablet       Salicylates     aspirin 81 MG tablet             Last three blood pressure readings:  BP Readings from Last 3 Encounters:   09/16/20 (!) 158/84   08/03/20 (!) 156/84   07/27/20 (!) 153/80            Tobacco Use      Smoking status: Never Smoker      Smokeless tobacco: Never Used          Hypertension   Last three blood pressure readings:  BP Readings from Last 3 Encounters:   09/16/20 (!) 158/84   08/03/20 (!) 156/84   07/27/20 (!) 153/80     Blood pressure: Failed    HTN Guidelines:  ? 139/89     Patient is due/failing the following:   Diabetic Follow-Up Visit and Hypertension follow-up visit    Type of outreach:    Spoke with pt and made appointment to come in     Questions for provider review:    None                                                                                                                                     Kimberly Pedroza MA       Chart routed to .

## 2020-12-30 ENCOUNTER — OFFICE VISIT (OUTPATIENT)
Dept: FAMILY MEDICINE | Facility: CLINIC | Age: 80
End: 2020-12-30
Payer: COMMERCIAL

## 2020-12-30 VITALS
HEIGHT: 67 IN | HEART RATE: 66 BPM | DIASTOLIC BLOOD PRESSURE: 80 MMHG | SYSTOLIC BLOOD PRESSURE: 132 MMHG | WEIGHT: 212 LBS | OXYGEN SATURATION: 98 % | BODY MASS INDEX: 33.27 KG/M2

## 2020-12-30 DIAGNOSIS — E11.21 TYPE 2 DIABETES MELLITUS WITH DIABETIC NEPHROPATHY, WITHOUT LONG-TERM CURRENT USE OF INSULIN (H): Primary | ICD-10-CM

## 2020-12-30 LAB — HBA1C MFR BLD: 10.2 % (ref 0–5.6)

## 2020-12-30 PROCEDURE — 36415 COLL VENOUS BLD VENIPUNCTURE: CPT | Performed by: FAMILY MEDICINE

## 2020-12-30 PROCEDURE — 99214 OFFICE O/P EST MOD 30 MIN: CPT | Performed by: FAMILY MEDICINE

## 2020-12-30 PROCEDURE — 83036 HEMOGLOBIN GLYCOSYLATED A1C: CPT | Performed by: FAMILY MEDICINE

## 2020-12-30 ASSESSMENT — MIFFLIN-ST. JEOR: SCORE: 1632.32

## 2020-12-30 NOTE — PROGRESS NOTES
"S: Cecilio Navarro is a 80 year old male with DM2.  Has not been controlled on glipizide and metformin.      Met with DM edu, wants to see what A1C is today    10.2    Long talk, more below    Problem list, med list, additional histories reviewed and updated, as indicated.      No cp or sob    O:/80   Pulse 66   Ht 1.689 m (5' 6.5\")   Wt 96.2 kg (212 lb)   SpO2 98%   BMI 33.71 kg/m    GEN: Alert and oriented, in no acute distress      A: DM2,  Worse    P: I think starting lantus or equivalent is what he needs.  Will have him f/u with dm [facility].      Wife here too, agrees.      Is going to s et up appt.  Back with me 3-4 months after DM [facility].     He needs to check sugars regularly, at least once/day, to get a sense of how his activity, food intake, and medications affect his sugars.      tt 30 min, more than 1/2 that time counseling on diabetes, A1C and insulin.  .       "

## 2021-01-28 ENCOUNTER — ALLIED HEALTH/NURSE VISIT (OUTPATIENT)
Dept: EDUCATION SERVICES | Facility: CLINIC | Age: 81
End: 2021-01-28
Payer: COMMERCIAL

## 2021-01-28 DIAGNOSIS — E11.21 TYPE 2 DIABETES MELLITUS WITH DIABETIC NEPHROPATHY, WITHOUT LONG-TERM CURRENT USE OF INSULIN (H): Primary | ICD-10-CM

## 2021-01-28 PROCEDURE — G0108 DIAB MANAGE TRN  PER INDIV: HCPCS | Performed by: DIETITIAN, REGISTERED

## 2021-01-28 RX ORDER — LANCETS
EACH MISCELLANEOUS
Qty: 100 EACH | Refills: 5 | Status: SHIPPED | OUTPATIENT
Start: 2021-01-28 | End: 2022-09-28

## 2021-01-28 NOTE — PATIENT INSTRUCTIONS
1.  If the Ozempic is covered and is agreed upon by Dr Morales    Start for first month once a week at 0.25 mg dose, then increase to 0.50 mg once weekly.    2.  Put on the needle, the first time you use the pen go to the dashed line and push into the air.   Then as you use the pen you just dial it up to the dose.

## 2021-01-28 NOTE — TELEPHONE ENCOUNTER
Dr. Morales,    Would like to try a GLP1 on Migel before insulin if you are ok with that.  He could use the wt loss and I am hoping it will be enough to get his blood sugars in range as well.    Would like to do Ozempic but it will depend on what GLP1  is covered by his insurance.      Are you ok with me starting this and following his numbers?     Addendem:  PT called insurance and Rybelsus is covered- the pill form GLP1.  That was cheapest on the insurance.  That is what they would like to try first.     Thanks! Bertha Norwood RD, CDE

## 2021-02-01 ENCOUNTER — OFFICE VISIT (OUTPATIENT)
Dept: DERMATOLOGY | Facility: CLINIC | Age: 81
End: 2021-02-01
Payer: COMMERCIAL

## 2021-02-01 VITALS — HEART RATE: 66 BPM | DIASTOLIC BLOOD PRESSURE: 82 MMHG | RESPIRATION RATE: 16 BRPM | SYSTOLIC BLOOD PRESSURE: 165 MMHG

## 2021-02-01 DIAGNOSIS — D23.9 DERMAL NEVUS: ICD-10-CM

## 2021-02-01 DIAGNOSIS — L81.4 LENTIGO: ICD-10-CM

## 2021-02-01 DIAGNOSIS — D18.01 ANGIOMA OF SKIN: ICD-10-CM

## 2021-02-01 DIAGNOSIS — Z85.828 HISTORY OF SKIN CANCER: Primary | ICD-10-CM

## 2021-02-01 DIAGNOSIS — L82.1 SEBORRHEIC KERATOSIS: ICD-10-CM

## 2021-02-01 PROCEDURE — 99213 OFFICE O/P EST LOW 20 MIN: CPT | Performed by: DERMATOLOGY

## 2021-02-01 RX ORDER — ORAL SEMAGLUTIDE 7 MG/1
7 TABLET ORAL DAILY
Qty: 90 TABLET | Refills: 1 | Status: SHIPPED | OUTPATIENT
Start: 2021-02-01 | End: 2021-05-10

## 2021-02-01 RX ORDER — ORAL SEMAGLUTIDE 3 MG/1
3 TABLET ORAL DAILY
Qty: 30 TABLET | Refills: 0 | Status: SHIPPED | OUTPATIENT
Start: 2021-02-01 | End: 2021-03-31 | Stop reason: DRUGHIGH

## 2021-02-01 NOTE — LETTER
2/1/2021         RE: Cecilio Navarro  [address] [facility] [place] [address]        Dear Colleague,    Thank you for referring your patient, Cecilio Navaror, to the [facility]. Please see a copy of my visit note below.    Cecilio Navarro is an extremely pleasant 80 year old year old male patient here today for hx of non-melanoma skin cancer.  He denies any new or changing skin lesions.  Patient reports the following symptoms:  none.  Patient reports the following modifying factors none.  Associated symptoms: none.  Patient has no other skin complaints today.  Remainder of the HPI, Meds, PMH, Allergies, FH, and SH was reviewed in chart.      Past Medical History:   Diagnosis Date     Actinic keratoses      Basal cell carcinoma      Type II or unspecified type diabetes mellitus without mention of complication, not stated as uncontrolled      Unspecified disorder of male genital organs      Unspecified essential hypertension        Past Surgical History:   Procedure Laterality Date     COLONOSCOPY  10/16/2013    Diverticulosis in sigmoid colon; repeat in 5 years; Surgeon: Garry Ambrose MD;  Location: [facility]     HC REMOVE TONSILS/ADENOIDS,<13 Y/O          Family History   Problem Relation Age of Onset     Hypertension Mother      Cerebrovascular Disease Mother         mentally affected     Cancer - colorectal Father         mets to liver  age74     Gastrointestinal Disease Brother         multilple colon polyps     Hypertension Brother      Gastrointestinal Disease Sister         reflux     Obesity Son      Allergies Daughter      Arthritis Brother      Melanoma No family hx of        Social History     Socioeconomic History     Marital status:      Spouse name: Not on file     Number of children: Not on file     Years of education: Not on file     Highest education level: Not on file   Occupational History     Not on file   Social Needs     Financial resource strain: Not on  file     Food insecurity     Worry: Not on file     Inability: Not on file     Transportation needs     Medical: Not on file     Non-medical: Not on file   Tobacco Use     Smoking status: Never Smoker     Smokeless tobacco: Never Used   Substance and Sexual Activity     Alcohol use: Yes     Comment: occasional social     Drug use: No     Sexual activity: Yes     Partners: Female   Lifestyle     Physical activity     Days per week: Not on file     Minutes per session: Not on file     Stress: Not on file   Relationships     Social connections     Talks on phone: Not on file     Gets together: Not on file     Attends Sabianist service: Not on file     Active member of club or organization: Not on file     Attends meetings of clubs or organizations: Not on file     Relationship status: Not on file     Intimate partner violence     Fear of current or ex partner: Not on file     Emotionally abused: Not on file     Physically abused: Not on file     Forced sexual activity: Not on file   Other Topics Concern      Service No     Blood Transfusions No     Caffeine Concern Yes     Comment: 1-2 day     Occupational Exposure No     Hobby Hazards Yes     Comment: [place]     Sleep Concern No     Stress Concern No     Weight Concern No     Special Diet Yes     Comment: Diabetic and low fat     Back Care No     Exercise Yes     Bike Helmet Not Asked     Seat Belt Yes     Self-Exams Yes     Comment: check blood sugars     Parent/sibling w/ CABG, MI or angioplasty before 65F 55M? Not Asked   Social History Narrative     Not on file       Outpatient Encounter Medications as of 2/1/2021   Medication Sig Dispense Refill     aspirin 81 MG tablet Take 81 mg by mouth daily       atorvastatin (LIPITOR) 40 MG tablet Take 1 tablet (40 mg) by mouth daily 90 tablet 3     blood glucose (NO BRAND SPECIFIED) test strip Use to test blood sugar 1 times daily.  Accu-chek guide me strips. 100 strip 5     blood glucose monitoring (SOFTCLIX)  lancets Use to test blood sugar 1 times daily 100 each 5     ciclopirox (LOPROX) 0.77 % cream Daily at bedtime to face 90 g 3     desonide (DESOWEN) 0.05 % external cream Every morning to face 60 g 3     glipiZIDE (GLUCOTROL XL) 10 MG 24 hr tablet Take 1 tablet (10 mg) by mouth daily 90 tablet 3     hydrochlorothiazide (HYDRODIURIL) 25 MG tablet Take 1 tablet (25 mg) by mouth daily 90 tablet 3     Ibuprofen (ADVIL) 200 MG capsule Take 400 mg by mouth 2 times daily as needed.       lisinopril (ZESTRIL) 40 MG tablet Take 1 tablet (40 mg) by mouth daily 90 tablet 3     metFORMIN (GLUCOPHAGE) 1000 MG tablet Take 1 tablet (1,000 mg) by mouth 2 times daily (with meals) 180 tablet 3     metoprolol succinate ER (TOPROL-XL) 50 MG 24 hr tablet Take 1 tablet (50 mg) by mouth daily 90 tablet 3     sildenafil (VIAGRA) 100 MG cap/tab Take 1 tablet (100 mg) by mouth daily as needed for erectile dysfunction 3 tablet 11     vardenafil (LEVITRA) 20 MG tablet Take 1 tablet (20 mg) by mouth daily as needed for erectile dysfunction 5 tablet 11     No facility-administered encounter medications on file as of 2/1/2021.              Review Of Systems  Skin: As above  Eyes: negative  Ears/Nose/Throat: negative  Respiratory: No shortness of breath, dyspnea on exertion, cough, or hemoptysis  Cardiovascular: negative  Gastrointestinal: negative  Genitourinary: negative  Musculoskeletal: negative  Neurologic: negative  Psychiatric: negative  Hematologic/Lymphatic/Immunologic: negative  Endocrine: negative      O:   NAD, WDWN, Alert & Oriented, Mood & Affect wnl, Vitals stable   Here today alone   BP (!) 165/82 (BP Location: Right arm, Patient Position: Sitting, Cuff Size: Adult Large)   Pulse 66   Resp 16    General appearance normal   Vitals stable   Alert, oriented and in no acute distress      Following lymph nodes palpated: Occipital, Cervical, Supraclavicular no lad       Stuck on papules and brown macules on trunk and ext   Red papules  on trunk  Flesh colored papules on trunk     The remainder of the full exam was normal; the following areas were examined:  conjunctiva/lids, oral mucosa, neck, peripheral vascular system, abdomen, lymph nodes, digits/nails, eccrine and apocrine glands, scalp/hair, face, neck, chest, abdomen, buttocks, back, RUE, LUE, RLE, LLE       Eyes: Conjunctivae/lids:Normal     ENT: Lips, buccal mucosa, tongue: normal    MSK:Normal    Cardiovascular: peripheral edema none    Pulm: Breathing Normal    Lymph Nodes: No Head and Neck Lymphadenopathy     Neuro/Psych: Orientation:Alert and Orientedx3 ; Mood/Affect:normal       A/P:  1. Seborrheic keratosis, lentigo, angioma, dermal nevus, hx of non-melanoma skin cancer     It was a pleasure speaking to Cecilio Navarro today.  BENIGN LESIONS DISCUSSED WITH PATIENT:  I discussed the specifics of tumor, prognosis, and genetics of benign lesions.  I explained that treatment of these lesions would be purely cosmetic and not medically neccessary.  I discussed with patient different removal options including excision, cautery and /or laser.      Signs and Symptoms of skin cancer discussed with patient.  Patient encouraged to perform monthly skin exams.  UV precautions reviewed with patient.  Patient to follow up with Primary Care provider regarding elevated blood pressure.  Risks of non-melanoma skin cancer discussed with patient   Return to clinic 12 months        Again, thank you for allowing me to participate in the care of your patient.        Sincerely,        Tejidner Jarvis MD

## 2021-02-01 NOTE — PROGRESS NOTES
Cecilio Navarro is an extremely pleasant 80 year old year old male patient here today for hx of non-melanoma skin cancer.  He denies any new or changing skin lesions.  Patient reports the following symptoms:  none.  Patient reports the following modifying factors none.  Associated symptoms: none.  Patient has no other skin complaints today.  Remainder of the HPI, Meds, PMH, Allergies, FH, and SH was reviewed in chart.      Past Medical History:   Diagnosis Date     Actinic keratoses      Basal cell carcinoma      Type II or unspecified type diabetes mellitus without mention of complication, not stated as uncontrolled      Unspecified disorder of male genital organs      Unspecified essential hypertension        Past Surgical History:   Procedure Laterality Date     COLONOSCOPY  10/16/2013    Diverticulosis in sigmoid colon; repeat in 5 years; Surgeon: Garry Ambrose MD;  Location: [facility]     HC REMOVE TONSILS/ADENOIDS,<13 Y/O          Family History   Problem Relation Age of Onset     Hypertension Mother      Cerebrovascular Disease Mother         mentally affected     Cancer - colorectal Father         mets to liver  age76     Gastrointestinal Disease Brother         multilple colon polyps     Hypertension Brother      Gastrointestinal Disease Sister         reflux     Obesity Son      Allergies Daughter      Arthritis Brother      Melanoma No family hx of        Social History     Socioeconomic History     Marital status:      Spouse name: Not on file     Number of children: Not on file     Years of education: Not on file     Highest education level: Not on file   Occupational History     Not on file   Social Needs     Financial resource strain: Not on file     Food insecurity     Worry: Not on file     Inability: Not on file     Transportation needs     Medical: Not on file     Non-medical: Not on file   Tobacco Use     Smoking status: Never Smoker     Smokeless tobacco: Never Used   Substance and  Sexual Activity     Alcohol use: Yes     Comment: occasional social     Drug use: No     Sexual activity: Yes     Partners: Female   Lifestyle     Physical activity     Days per week: Not on file     Minutes per session: Not on file     Stress: Not on file   Relationships     Social connections     Talks on phone: Not on file     Gets together: Not on file     Attends Gnosticism service: Not on file     Active member of club or organization: Not on file     Attends meetings of clubs or organizations: Not on file     Relationship status: Not on file     Intimate partner violence     Fear of current or ex partner: Not on file     Emotionally abused: Not on file     Physically abused: Not on file     Forced sexual activity: Not on file   Other Topics Concern      Service No     Blood Transfusions No     Caffeine Concern Yes     Comment: 1-2 day     Occupational Exposure No     Hobby Hazards Yes     Comment: [place]     Sleep Concern No     Stress Concern No     Weight Concern No     Special Diet Yes     Comment: Diabetic and low fat     Back Care No     Exercise Yes     Bike Helmet Not Asked     Seat Belt Yes     Self-Exams Yes     Comment: check blood sugars     Parent/sibling w/ CABG, MI or angioplasty before 65F 55M? Not Asked   Social History Narrative     Not on file       Outpatient Encounter Medications as of 2/1/2021   Medication Sig Dispense Refill     aspirin 81 MG tablet Take 81 mg by mouth daily       atorvastatin (LIPITOR) 40 MG tablet Take 1 tablet (40 mg) by mouth daily 90 tablet 3     blood glucose (NO BRAND SPECIFIED) test strip Use to test blood sugar 1 times daily.  Accu-chek guide me strips. 100 strip 5     blood glucose monitoring (SOFTCLIX) lancets Use to test blood sugar 1 times daily 100 each 5     ciclopirox (LOPROX) 0.77 % cream Daily at bedtime to face 90 g 3     desonide (DESOWEN) 0.05 % external cream Every morning to face 60 g 3     glipiZIDE (GLUCOTROL XL) 10 MG 24 hr tablet Take 1  tablet (10 mg) by mouth daily 90 tablet 3     hydrochlorothiazide (HYDRODIURIL) 25 MG tablet Take 1 tablet (25 mg) by mouth daily 90 tablet 3     Ibuprofen (ADVIL) 200 MG capsule Take 400 mg by mouth 2 times daily as needed.       lisinopril (ZESTRIL) 40 MG tablet Take 1 tablet (40 mg) by mouth daily 90 tablet 3     metFORMIN (GLUCOPHAGE) 1000 MG tablet Take 1 tablet (1,000 mg) by mouth 2 times daily (with meals) 180 tablet 3     metoprolol succinate ER (TOPROL-XL) 50 MG 24 hr tablet Take 1 tablet (50 mg) by mouth daily 90 tablet 3     sildenafil (VIAGRA) 100 MG cap/tab Take 1 tablet (100 mg) by mouth daily as needed for erectile dysfunction 3 tablet 11     vardenafil (LEVITRA) 20 MG tablet Take 1 tablet (20 mg) by mouth daily as needed for erectile dysfunction 5 tablet 11     No facility-administered encounter medications on file as of 2/1/2021.              Review Of Systems  Skin: As above  Eyes: negative  Ears/Nose/Throat: negative  Respiratory: No shortness of breath, dyspnea on exertion, cough, or hemoptysis  Cardiovascular: negative  Gastrointestinal: negative  Genitourinary: negative  Musculoskeletal: negative  Neurologic: negative  Psychiatric: negative  Hematologic/Lymphatic/Immunologic: negative  Endocrine: negative      O:   NAD, WDWN, Alert & Oriented, Mood & Affect wnl, Vitals stable   Here today alone   BP (!) 165/82 (BP Location: Right arm, Patient Position: Sitting, Cuff Size: Adult Large)   Pulse 66   Resp 16    General appearance normal   Vitals stable   Alert, oriented and in no acute distress      Following lymph nodes palpated: Occipital, Cervical, Supraclavicular no lad       Stuck on papules and brown macules on trunk and ext   Red papules on trunk  Flesh colored papules on trunk     The remainder of the full exam was normal; the following areas were examined:  conjunctiva/lids, oral mucosa, neck, peripheral vascular system, abdomen, lymph nodes, digits/nails, eccrine and apocrine glands,  scalp/hair, face, neck, chest, abdomen, buttocks, back, RUE, LUE, RLE, LLE       Eyes: Conjunctivae/lids:Normal     ENT: Lips, buccal mucosa, tongue: normal    MSK:Normal    Cardiovascular: peripheral edema none    Pulm: Breathing Normal    Lymph Nodes: No Head and Neck Lymphadenopathy     Neuro/Psych: Orientation:Alert and Orientedx3 ; Mood/Affect:normal       A/P:  1. Seborrheic keratosis, lentigo, angioma, dermal nevus, hx of non-melanoma skin cancer     It was a pleasure speaking to Cecilio Navarro today.  BENIGN LESIONS DISCUSSED WITH PATIENT:  I discussed the specifics of tumor, prognosis, and genetics of benign lesions.  I explained that treatment of these lesions would be purely cosmetic and not medically neccessary.  I discussed with patient different removal options including excision, cautery and /or laser.      Signs and Symptoms of skin cancer discussed with patient.  Patient encouraged to perform monthly skin exams.  UV precautions reviewed with patient.  Patient to follow up with Primary Care provider regarding elevated blood pressure.  Risks of non-melanoma skin cancer discussed with patient   Return to clinic 12 months

## 2021-02-01 NOTE — NURSING NOTE
"Initial BP (!) 165/82 (BP Location: Right arm, Patient Position: Sitting, Cuff Size: Adult Large)   Pulse 66   Resp 16  Estimated body mass index is 33.71 kg/m  as calculated from the following:    Height as of 12/30/20: 1.689 m (5' 6.5\").    Weight as of 12/30/20: 96.2 kg (212 lb). .    Stephanie Pierce, [facility]    "

## 2021-02-06 ENCOUNTER — HEALTH MAINTENANCE LETTER (OUTPATIENT)
Age: 81
End: 2021-02-06

## 2021-02-11 ENCOUNTER — IMMUNIZATION (OUTPATIENT)
Dept: FAMILY MEDICINE | Facility: CLINIC | Age: 81
End: 2021-02-11
Payer: COMMERCIAL

## 2021-02-11 PROCEDURE — 0001A PR COVID VAC PFIZER DIL RECON 30 MCG/0.3 ML IM: CPT

## 2021-02-11 PROCEDURE — 91300 PR COVID VAC PFIZER DIL RECON 30 MCG/0.3 ML IM: CPT

## 2021-02-15 ENCOUNTER — TELEPHONE (OUTPATIENT)
Dept: FAMILY MEDICINE | Facility: CLINIC | Age: 81
End: 2021-02-15

## 2021-02-15 NOTE — TELEPHONE ENCOUNTER
[facility]abetic - Medical Necessity Records  Records Faxed 2/12/21  [facility]

## 2021-02-24 ENCOUNTER — VIRTUAL VISIT (OUTPATIENT)
Dept: EDUCATION SERVICES | Facility: CLINIC | Age: 81
End: 2021-02-24
Payer: COMMERCIAL

## 2021-02-24 DIAGNOSIS — E11.21 TYPE 2 DIABETES MELLITUS WITH DIABETIC NEPHROPATHY, WITHOUT LONG-TERM CURRENT USE OF INSULIN (H): Primary | ICD-10-CM

## 2021-02-24 PROCEDURE — G0108 DIAB MANAGE TRN  PER INDIV: HCPCS | Mod: 95 | Performed by: DIETITIAN, REGISTERED

## 2021-02-24 NOTE — PROGRESS NOTES
"Diabetes Self-Management Education & Support    Telephone for: Individual review    SUBJECTIVE/OBJECTIVE:  Presents for: Individual review  Diabetes type: Type 2  Cultural Influences/Ethnic Background:  American      Diabetes Symptoms & Complications:          Patient Problem List and Family Medical History reviewed for relevant medical history, current medical status, and diabetes risk factors.    Vitals:  There were no vitals taken for this visit.  Estimated body mass index is 33.71 kg/m  as calculated from the following:    Height as of 12/30/20: 1.689 m (5' 6.5\").    Weight as of 12/30/20: 96.2 kg (212 lb).   Last 3 BP:   BP Readings from Last 3 Encounters:   02/01/21 (!) 165/82   12/30/20 132/80   09/16/20 (!) 158/84       History   Smoking Status     Never Smoker   Smokeless Tobacco     Never Used       Labs:  Lab Results   Component Value Date    A1C 10.2 12/30/2020     Lab Results   Component Value Date     11/07/2019     Lab Results   Component Value Date    LDL 35 09/16/2020     HDL Cholesterol   Date Value Ref Range Status   09/16/2020 43 >39 mg/dL Final   ]  GFR Estimate   Date Value Ref Range Status   11/07/2019 82 >60 mL/min/[1.73_m2] Final     Comment:     Non  GFR Calc  Starting 12/18/2018, serum creatinine based estimated GFR (eGFR) will be   calculated using the Chronic Kidney Disease Epidemiology Collaboration   (CKD-EPI) equation.       GFR Estimate If Black   Date Value Ref Range Status   11/07/2019 >90 >60 mL/min/[1.73_m2] Final     Comment:      GFR Calc  Starting 12/18/2018, serum creatinine based estimated GFR (eGFR) will be   calculated using the Chronic Kidney Disease Epidemiology Collaboration   (CKD-EPI) equation.       Lab Results   Component Value Date    CR 0.90 11/07/2019     No results found for: MICROALBUMIN    Healthy Eating:  Healthy Eating Assessed Today: Yes  Breakfast- cereal cheerios or eggs, you, sat oatmeal  Lunch- hardly been doing " much or chicken noodle soup low salt or tomato soup, grilled cheese  Supper- chicken pot pie or ribs, salad  Snacks-   Being Active:  Being Active Assessed Today: Yes  Exercise:: Yes    Monitoring:      Breakfast pp Lunch pp Supper  pp HS   16-Feb 141         17-Feb 143         18-Feb 169         19-Feb 189         20-Feb 156         21-Feb 177         23-Feb 216         24-Feb 188          #DIV/0! #DIV/0! #DIV/0! #DIV/0! #DIV/0! #DIV/0!       Taking Medications:  Diabetes Medication(s)     Biguanides       metFORMIN (GLUCOPHAGE) 1000 MG tablet    Take 1 tablet (1,000 mg) by mouth 2 times daily (with meals)    Sulfonylureas       glipiZIDE (GLUCOTROL XL) 10 MG 24 hr tablet    Take 1 tablet (10 mg) by mouth daily    Incretin Mimetic Agents (GLP-1 Receptor Agonists)       Semaglutide (RYBELSUS) 3 MG TABS    Take 3 mg by mouth daily Take on an empty stomach, do not eat or take other meds for 30 minutes.     Semaglutide (RYBELSUS) 7 MG TABS    Take 7 mg by mouth daily Take in am on empty stomach, no other meds/food for 30 minutes.  Start when 3 mg dose is gone.               Problem Solving:   not assessed              Reducing Risks:   not assessed    Healthy Coping:     Patient Activation Measure Survey Score:  REMINGTON Score (Last Two) 11/15/2010   REMINGTON Raw Score 52   Activation Score 100   REMINGTON Level 4       Diabetes knowledge and skills assessment:   Patient is knowledgeable in diabetes management concepts related to: Healthy Eating, Being Active and Monitoring    Patient needs further education on the following diabetes management concepts: Healthy Eating, Being Active, Monitoring, Taking Medication, Problem Solving, Reducing Risks and Healthy Coping    Based on learning assessment above, most appropriate setting for further diabetes education would be: Individual setting.      INTERVENTIONS:    Education provided today on:  AADE Self-Care Behaviors:  Healthy Eating: carbohydrate counting, consistency in amount,  composition, and timing of food intake and label reading  Being Active: relationship to blood glucose and describe appropriate activity program  Monitoring: individual blood glucose targets and frequency of monitoring    Opportunities for ongoing education and support in diabetes-self management were discussed.    Pt verbalized understanding of concepts discussed and recommendations provided today.       Education Materials Provided:  Carbohydrate Counting and No new materials provided today      ASSESSMENT:  He gets full faster and is not hungry.  He has lost about 8 lbs he felt.  He feels good, pill is going well.  Eating less.  Will reassess in 4 weeks, once he is on higher dose of medication.  I will call on the test strip situation, in medicare audit since 2/15.          Patient's most recent   Lab Results   Component Value Date    A1C 10.2 12/30/2020    is not meeting goal of <7.0    PLAN  See Patient Instructions for co-developed, patient-stated behavior change goals.  AVS printed and provided to patient today. See Follow-Up section for recommended follow-up.      Time Spent: 30 minutes  Encounter Type: Individual    Any diabetes medication dose changes were made via the CDE Protocol and Collaborative Practice Agreement with the patient's referring provider. A copy of this encounter was shared with the provider.

## 2021-03-04 ENCOUNTER — IMMUNIZATION (OUTPATIENT)
Dept: FAMILY MEDICINE | Facility: CLINIC | Age: 81
End: 2021-03-04
Attending: FAMILY MEDICINE
Payer: COMMERCIAL

## 2021-03-04 PROCEDURE — 91300 PR COVID VAC PFIZER DIL RECON 30 MCG/0.3 ML IM: CPT

## 2021-03-04 PROCEDURE — 0002A PR COVID VAC PFIZER DIL RECON 30 MCG/0.3 ML IM: CPT

## 2021-03-31 ENCOUNTER — PATIENT OUTREACH (OUTPATIENT)
Dept: EDUCATION SERVICES | Facility: CLINIC | Age: 81
End: 2021-03-31
Payer: COMMERCIAL

## 2021-03-31 DIAGNOSIS — E11.21 TYPE 2 DIABETES MELLITUS WITH DIABETIC NEPHROPATHY, WITHOUT LONG-TERM CURRENT USE OF INSULIN (H): Primary | ICD-10-CM

## 2021-03-31 PROCEDURE — G0108 DIAB MANAGE TRN  PER INDIV: HCPCS | Mod: 95 | Performed by: DIETITIAN, REGISTERED

## 2021-03-31 NOTE — PROGRESS NOTES
"Diabetes Self-Management Education & Support     telephone visit for: Individual review    SUBJECTIVE/OBJECTIVE:  Presents for: Individual review  Diabetes type: Type 2  Cultural Influences/Ethnic Background:  American      Diabetes Symptoms & Complications:          Patient Problem List and Family Medical History reviewed for relevant medical history, current medical status, and diabetes risk factors.    Vitals:  There were no vitals taken for this visit.  Estimated body mass index is 33.71 kg/m  as calculated from the following:    Height as of 12/30/20: 1.689 m (5' 6.5\").    Weight as of 12/30/20: 96.2 kg (212 lb).   Last 3 BP:   BP Readings from Last 3 Encounters:   02/01/21 (!) 165/82   12/30/20 132/80   09/16/20 (!) 158/84       History   Smoking Status     Never Smoker   Smokeless Tobacco     Never Used       Labs:  Lab Results   Component Value Date    A1C 10.2 12/30/2020     Lab Results   Component Value Date     11/07/2019     Lab Results   Component Value Date    LDL 35 09/16/2020     HDL Cholesterol   Date Value Ref Range Status   09/16/2020 43 >39 mg/dL Final   ]  GFR Estimate   Date Value Ref Range Status   11/07/2019 82 >60 mL/min/[1.73_m2] Final     Comment:     Non  GFR Calc  Starting 12/18/2018, serum creatinine based estimated GFR (eGFR) will be   calculated using the Chronic Kidney Disease Epidemiology Collaboration   (CKD-EPI) equation.       GFR Estimate If Black   Date Value Ref Range Status   11/07/2019 >90 >60 mL/min/[1.73_m2] Final     Comment:      GFR Calc  Starting 12/18/2018, serum creatinine based estimated GFR (eGFR) will be   calculated using the Chronic Kidney Disease Epidemiology Collaboration   (CKD-EPI) equation.       Lab Results   Component Value Date    CR 0.90 11/07/2019     No results found for: MICROALBUMIN    Healthy Eating:  Healthy Eating Assessed Today: Yes  Breakfast: cereal, he is eating a smaller portion than he used to and puts " some blueberries in it or oatmeal and eggs  Lunch: chow mein, over dry noodles.  Dinner: split an apple  Snacks: grapes, had a small portion of ice cream last night  Other: wt: 198  Beverages: Water, Coffee, Alcohol    Being Active:  Being Active Assessed Today: Yes  Exercise:: Yes(takes the dog for a walk each day)    Monitoring:      Breakfast pp Lunch pp Supper  pp HS   24-Mar 165         25-Mar 166         26-Mar 175         27-Mar 162         28-Mar 169         29-Mar 162         30-Mar 166         31-Mar 159          #DIV/0! #DIV/0! #DIV/0! #DIV/0! #DIV/0! #DIV/0!           Taking Medications:  Diabetes Medication(s)     Biguanides       metFORMIN (GLUCOPHAGE) 1000 MG tablet    Take 1 tablet (1,000 mg) by mouth 2 times daily (with meals)    Sulfonylureas       glipiZIDE (GLUCOTROL XL) 10 MG 24 hr tablet    Take 1 tablet (10 mg) by mouth daily    Incretin Mimetic Agents (GLP-1 Receptor Agonists)       Semaglutide (RYBELSUS) 3 MG TABS    Take 3 mg by mouth daily Take on an empty stomach, do not eat or take other meds for 30 minutes.     Semaglutide (RYBELSUS) 7 MG TABS    Take 7 mg by mouth daily Take in am on empty stomach, no other meds/food for 30 minutes.  Start when 3 mg dose is gone.               Problem Solving:   not assessed              Reducing Risks:   not assessed    Healthy Coping:     Patient Activation Measure Survey Score:  REMINGTON Score (Last Two) 11/15/2010   REMINGTON Raw Score 52   Activation Score 100   REMINGTON Level 4       Diabetes knowledge and skills assessment:   Patient is knowledgeable in diabetes management concepts related to: Healthy Eating, Being Active and Monitoring    Patient needs further education on the following diabetes management concepts: Healthy Eating, Being Active, Monitoring, Taking Medication, Problem Solving, Reducing Risks and Healthy Coping    Based on learning assessment above, most appropriate setting for further diabetes education would be: Individual  setting.      INTERVENTIONS:    Education provided today on:  AADE Self-Care Behaviors:  Healthy Eating: consistency in amount, composition, and timing of food intake and portion control  Being Active: relationship to blood glucose and describe appropriate activity program  Monitoring: log and interpret results, individual blood glucose targets and frequency of monitoring  Taking Medication: action of prescribed medication and when to take medications    Opportunities for ongoing education and support in diabetes-self management were discussed.    Pt verbalized understanding of concepts discussed and recommendations provided today.       Education Materials Provided:  No new materials provided today      ASSESSMENT:  He has been on the rybelsus 7mg for one month, he would like to remain on this dose and see how his a1c turns out.  He is eating way less for meal plan.  He will work on walking the dog twice a day to see if we can get number down a bit more.  Also want him to test a few numbers before lunch or supper.        Patient's most recent   Lab Results   Component Value Date    A1C 10.2 12/30/2020    is not meeting goal of <8.0    PLAN  See Patient Instructions for co-developed, patient-stated behavior change goals.  AVS printed and provided to patient today. See Follow-Up section for recommended follow-up.      Time Spent: 60 minutes  Encounter Type: Individual    Any diabetes medication dose changes were made via the CDE Protocol and Collaborative Practice Agreement with the patient's primary care provider. A copy of this encounter was shared with the provider.

## 2021-04-28 ENCOUNTER — TELEPHONE (OUTPATIENT)
Dept: EDUCATION SERVICES | Facility: CLINIC | Age: 81
End: 2021-04-28

## 2021-04-28 DIAGNOSIS — E11.21 TYPE 2 DIABETES MELLITUS WITH DIABETIC NEPHROPATHY, WITHOUT LONG-TERM CURRENT USE OF INSULIN (H): Primary | ICD-10-CM

## 2021-04-28 NOTE — TELEPHONE ENCOUNTER
Diabetes education contact:     Breakfast pp Lunch pp Supper  pp HS   21-Apr 143          160          144          144          145          161          128         28-Apr 173          #DIV/0! #DIV/0! #DIV/0! #DIV/0! #DIV/0! #DIV/0!     Average down another 15 mg/dL in last month.  Wt is down: 194#.  Appetite is down with Rybelsus.    Bertha Norwood RD, CDE

## 2021-05-10 ENCOUNTER — OFFICE VISIT (OUTPATIENT)
Dept: FAMILY MEDICINE | Facility: CLINIC | Age: 81
End: 2021-05-10
Payer: COMMERCIAL

## 2021-05-10 VITALS
HEIGHT: 67 IN | SYSTOLIC BLOOD PRESSURE: 120 MMHG | WEIGHT: 198 LBS | DIASTOLIC BLOOD PRESSURE: 78 MMHG | BODY MASS INDEX: 31.08 KG/M2 | HEART RATE: 78 BPM | OXYGEN SATURATION: 99 % | TEMPERATURE: 97.5 F

## 2021-05-10 DIAGNOSIS — E78.5 HYPERLIPIDEMIA LDL GOAL <100: ICD-10-CM

## 2021-05-10 DIAGNOSIS — E11.21 TYPE 2 DIABETES MELLITUS WITH DIABETIC NEPHROPATHY, WITHOUT LONG-TERM CURRENT USE OF INSULIN (H): Primary | ICD-10-CM

## 2021-05-10 DIAGNOSIS — I10 HTN, GOAL BELOW 140/90: ICD-10-CM

## 2021-05-10 LAB — HBA1C MFR BLD: 7 % (ref 0–5.6)

## 2021-05-10 PROCEDURE — 83036 HEMOGLOBIN GLYCOSYLATED A1C: CPT | Performed by: FAMILY MEDICINE

## 2021-05-10 PROCEDURE — 36415 COLL VENOUS BLD VENIPUNCTURE: CPT | Performed by: FAMILY MEDICINE

## 2021-05-10 PROCEDURE — 99214 OFFICE O/P EST MOD 30 MIN: CPT | Performed by: FAMILY MEDICINE

## 2021-05-10 RX ORDER — ORAL SEMAGLUTIDE 7 MG/1
7 TABLET ORAL DAILY
Qty: 90 TABLET | Refills: 1 | Status: SHIPPED | OUTPATIENT
Start: 2021-05-10 | End: 2021-08-30

## 2021-05-10 ASSESSMENT — MIFFLIN-ST. JEOR: SCORE: 1568.81

## 2021-05-10 NOTE — PROGRESS NOTES
" A: dm2 with nephropathy, improved       Htn, stable       Hyperlipidemia, stable    P: fills on rybelsus. Continue other meds.  Really great, 7.0 on A1C    F/u  4 months for fills on everything else.    Weight management plan: diete/xercse           S: Cecilio Navarro is a 80 year old male with DM2, nephropathy.  Recheck.  On rybelsus and metformin and glipizide.      Htn; stable.  Tolerating meds    Hyperlipidemia: stable, statin    Has lost some weight on the rybelsus.      O: /78   Pulse 78   Temp 97.5  F (36.4  C) (Tympanic)   Ht 1.689 m (5' 6.5\")   Wt 89.8 kg (198 lb)   SpO2 99%   BMI 31.48 kg/m    GEN: Alert and oriented, in no acute distress  CV: RRR, no murmur  EXT: no edema or lesions noted in lower extremities      "

## 2021-06-28 ENCOUNTER — PATIENT OUTREACH (OUTPATIENT)
Dept: EDUCATION SERVICES | Facility: CLINIC | Age: 81
End: 2021-06-28
Payer: COMMERCIAL

## 2021-06-28 DIAGNOSIS — E11.21 TYPE 2 DIABETES MELLITUS WITH DIABETIC NEPHROPATHY, WITHOUT LONG-TERM CURRENT USE OF INSULIN (H): Primary | ICD-10-CM

## 2021-06-28 PROCEDURE — G0108 DIAB MANAGE TRN  PER INDIV: HCPCS | Performed by: DIETITIAN, REGISTERED

## 2021-06-28 NOTE — PROGRESS NOTES
"Diabetes Self-Management Education & Support    Presents for: Individual review, via telephone    SUBJECTIVE/OBJECTIVE:  Presents for: Individual review  Diabetes type: Type 2  Cultural Influences/Ethnic Background:  American      Diabetes Symptoms & Complications:          Patient Problem List and Family Medical History reviewed for relevant medical history, current medical status, and diabetes risk factors.    Vitals:  There were no vitals taken for this visit.  Estimated body mass index is 31.48 kg/m  as calculated from the following:    Height as of 5/10/21: 1.689 m (5' 6.5\").    Weight as of 5/10/21: 89.8 kg (198 lb).   Last 3 BP:   BP Readings from Last 3 Encounters:   05/10/21 120/78   02/01/21 (!) 165/82   12/30/20 132/80       History   Smoking Status     Never Smoker   Smokeless Tobacco     Never Used       Labs:  Lab Results   Component Value Date    A1C 7.0 05/10/2021     Lab Results   Component Value Date     11/07/2019     Lab Results   Component Value Date    LDL 35 09/16/2020     HDL Cholesterol   Date Value Ref Range Status   09/16/2020 43 >39 mg/dL Final   ]  GFR Estimate   Date Value Ref Range Status   11/07/2019 82 >60 mL/min/[1.73_m2] Final     Comment:     Non  GFR Calc  Starting 12/18/2018, serum creatinine based estimated GFR (eGFR) will be   calculated using the Chronic Kidney Disease Epidemiology Collaboration   (CKD-EPI) equation.       GFR Estimate If Black   Date Value Ref Range Status   11/07/2019 >90 >60 mL/min/[1.73_m2] Final     Comment:      GFR Calc  Starting 12/18/2018, serum creatinine based estimated GFR (eGFR) will be   calculated using the Chronic Kidney Disease Epidemiology Collaboration   (CKD-EPI) equation.       Lab Results   Component Value Date    CR 0.90 11/07/2019     No results found for: MICROALBUMIN    Healthy Eating:  Healthy Eating Assessed Today: Yes  Breakfast: cereal or oatmeal or eggs/toast  Lunch: pulled pork, cupcake, " coleslaw or watermelon, hotdog or tuna salad or yogurt and cheese or cheese sandwich  Dinner: hotdogs, beans or boneless chicken BBQ wings or tuna sandwich or lettuce salad or cheeseburger with rootbeer  Other: wt at home: 188#    Being Active:       Monitoring:      Breakfast pp Lunch pp Supper  pp HS   21-May 126          114          139          120          126          135          138         28-May 110          #DIV/0! #DIV/0! #DIV/0! #DIV/0! #DIV/0! #DIV/0!      Breakfast pp Lunch pp Supper  pp HS   21-Jun 147          176          150          154          146          135          157         28-Jun 153          #DIV/0! #DIV/0! #DIV/0! #DIV/0! #DIV/0! #DIV/0!               Taking Medications:  Diabetes Medication(s)     Biguanides       metFORMIN (GLUCOPHAGE) 1000 MG tablet    Take 1 tablet (1,000 mg) by mouth 2 times daily (with meals)    Sulfonylureas       glipiZIDE (GLUCOTROL XL) 10 MG 24 hr tablet    Take 1 tablet (10 mg) by mouth daily    Incretin Mimetic Agents (GLP-1 Receptor Agonists)       Semaglutide (RYBELSUS) 7 MG TABS    Take 7 mg by mouth daily Take in am on empty stomach, no other meds/food for 30 minutes.  Start when 3 mg dose is gone.               Problem Solving:     Not assessed            Reducing Risks:   not assessed    Healthy Coping:     Patient Activation Measure Survey Score:  REMINGTON Score (Last Two) 11/15/2010   REMINGTON Raw Score 52   Activation Score 100   REMINGTON Level 4       Diabetes knowledge and skills assessment:   Patient is knowledgeable in diabetes management concepts related to: Healthy Eating, Being Active and Monitoring    Patient needs further education on the following diabetes management concepts: Healthy Eating, Being Active, Monitoring, Taking Medication, Problem Solving, Reducing Risks and Healthy Coping    Based on learning assessment above, most appropriate setting for further diabetes education would be: Individual  setting.      INTERVENTIONS:    Education provided today on:  AADE Self-Care Behaviors:  Healthy Eating: carbohydrate counting, consistency in amount, composition, and timing of food intake and label reading  Being Active: relationship to blood glucose and describe appropriate activity program  Monitoring: individual blood glucose targets and frequency of monitoring  Taking Medication: action of prescribed medication and when to take medications    Opportunities for ongoing education and support in diabetes-self management were discussed.    Pt verbalized understanding of concepts discussed and recommendations provided today.       Education Materials Provided:  No new materials provided today      ASSESSMENT:  -he is still maintaining his wt loss 188#  -am numbers have increased some average in 150's now, appetite is better so suspect it is food related.  -he is staying active  -will wait to see what his next a1c ends up being        Patient's most recent   Lab Results   Component Value Date    A1C 7.0 05/10/2021    is meeting goal of <7.0    PLAN  See Patient Instructions for co-developed, patient-stated behavior change goals.  AVS printed and provided to patient today. See Follow-Up section for recommended follow-up.      Time Spent: 30 minutes  Encounter Type: Individual    Any diabetes medication dose changes were made via the CDE Protocol and Collaborative Practice Agreement with the patient's primary care provider. A copy of this encounter was shared with the provider.

## 2021-07-21 ENCOUNTER — OFFICE VISIT (OUTPATIENT)
Dept: FAMILY MEDICINE | Facility: CLINIC | Age: 81
End: 2021-07-21
Payer: COMMERCIAL

## 2021-07-21 VITALS
SYSTOLIC BLOOD PRESSURE: 128 MMHG | BODY MASS INDEX: 29.98 KG/M2 | HEIGHT: 67 IN | DIASTOLIC BLOOD PRESSURE: 72 MMHG | HEART RATE: 74 BPM | OXYGEN SATURATION: 99 % | WEIGHT: 191 LBS

## 2021-07-21 DIAGNOSIS — E11.21 TYPE 2 DIABETES MELLITUS WITH DIABETIC NEPHROPATHY, WITHOUT LONG-TERM CURRENT USE OF INSULIN (H): ICD-10-CM

## 2021-07-21 DIAGNOSIS — R30.0 DYSURIA: ICD-10-CM

## 2021-07-21 DIAGNOSIS — N39.0 URINARY TRACT INFECTION WITHOUT HEMATURIA, SITE UNSPECIFIED: Primary | ICD-10-CM

## 2021-07-21 DIAGNOSIS — R15.9 INCONTINENCE OF FECES, UNSPECIFIED FECAL INCONTINENCE TYPE: ICD-10-CM

## 2021-07-21 LAB
ALBUMIN UR-MCNC: 30 MG/DL
APPEARANCE UR: ABNORMAL
BACTERIA #/AREA URNS HPF: ABNORMAL /HPF
BILIRUB UR QL STRIP: NEGATIVE
COLOR UR AUTO: YELLOW
GLUCOSE UR STRIP-MCNC: NEGATIVE MG/DL
HGB UR QL STRIP: ABNORMAL
KETONES UR STRIP-MCNC: NEGATIVE MG/DL
LEUKOCYTE ESTERASE UR QL STRIP: ABNORMAL
NITRATE UR QL: POSITIVE
PH UR STRIP: 6 [PH] (ref 5–7)
RBC #/AREA URNS AUTO: ABNORMAL /HPF
SP GR UR STRIP: 1.02 (ref 1–1.03)
UROBILINOGEN UR STRIP-ACNC: 0.2 E.U./DL
WBC #/AREA URNS AUTO: >100 /HPF

## 2021-07-21 PROCEDURE — 87088 URINE BACTERIA CULTURE: CPT | Performed by: FAMILY MEDICINE

## 2021-07-21 PROCEDURE — 81001 URINALYSIS AUTO W/SCOPE: CPT | Performed by: FAMILY MEDICINE

## 2021-07-21 PROCEDURE — 99214 OFFICE O/P EST MOD 30 MIN: CPT | Performed by: FAMILY MEDICINE

## 2021-07-21 PROCEDURE — 87086 URINE CULTURE/COLONY COUNT: CPT | Performed by: FAMILY MEDICINE

## 2021-07-21 PROCEDURE — 87186 SC STD MICRODIL/AGAR DIL: CPT | Performed by: FAMILY MEDICINE

## 2021-07-21 RX ORDER — SULFAMETHOXAZOLE/TRIMETHOPRIM 800-160 MG
1 TABLET ORAL 2 TIMES DAILY
Qty: 20 TABLET | Refills: 0 | Status: SHIPPED | OUTPATIENT
Start: 2021-07-21 | End: 2021-09-22

## 2021-07-21 ASSESSMENT — MIFFLIN-ST. JEOR: SCORE: 1537.06

## 2021-07-21 NOTE — PROGRESS NOTES
"A: uti, prostate as source?         Diabetes 2, worse with iilllness       Stool incontinence, random    P: get this treated, given DM2 and possiblee prostatitis, will do 10 days, extend further if sx not resolved    Culture urine    If stool issues continuing, will need further discussion.  Has f/u for labs and med refills in 2 months.   Earlier prn.            S: Cecilio Navarro is a 80 year old male with urinary f requency, urgency.  Has been off/on for months, but worse recently.      Has had a few random stool incontinence, feels like maybe prostate not acting right as well?      Diabetes, sugars up/down with illness last few days.  No fever, no vomiting.     Eating OK    O:/72   Pulse 74   Ht 1.689 m (5' 6.5\")   Wt 86.6 kg (191 lb)   SpO2 99%   BMI 30.37 kg/m    GEN: Alert and oriented, in no acute distress  EXT: no edema or lesions noted in lower extremities    Ua: positive LE and nitrite  Micro: lots of WBC      "

## 2021-07-24 LAB
BACTERIA UR CULT: ABNORMAL
BACTERIA UR CULT: ABNORMAL

## 2021-07-26 ENCOUNTER — TELEPHONE (OUTPATIENT)
Dept: FAMILY MEDICINE | Facility: CLINIC | Age: 81
End: 2021-07-26

## 2021-07-26 NOTE — TELEPHONE ENCOUNTER
Form receive back signed 7/26/21    Faxed back and sent to be scanned in to epic 7/26/21    [facility]

## 2021-08-30 ENCOUNTER — TELEPHONE (OUTPATIENT)
Dept: EDUCATION SERVICES | Facility: CLINIC | Age: 81
End: 2021-08-30

## 2021-08-30 DIAGNOSIS — E11.21 TYPE 2 DIABETES MELLITUS WITH DIABETIC NEPHROPATHY, WITHOUT LONG-TERM CURRENT USE OF INSULIN (H): ICD-10-CM

## 2021-08-30 RX ORDER — ORAL SEMAGLUTIDE 7 MG/1
7 TABLET ORAL DAILY
Qty: 30 TABLET | Refills: 3 | Status: SHIPPED | OUTPATIENT
Start: 2021-08-30 | End: 2021-09-22

## 2021-08-30 NOTE — TELEPHONE ENCOUNTER
Diabetes education contact:    Pt is down to 1 tablet of Rybelsus, he would like to remain on this current dose until after next a1c. Numbers are up a bit as you can see below.   Wt is down some as well.     Breakfast pp Lunch pp Supper  pp HS   23-Aug 161          161          160          155          128          162          154         30-Aug 128          #DIV/0! #DIV/0! #DIV/0! #DIV/0! #DIV/0! #DIV/0!     Bertha Norwood RD, [facility]

## 2021-09-12 ENCOUNTER — HEALTH MAINTENANCE LETTER (OUTPATIENT)
Age: 81
End: 2021-09-12

## 2021-09-17 DIAGNOSIS — E11.21 TYPE 2 DIABETES MELLITUS WITH DIABETIC NEPHROPATHY, WITHOUT LONG-TERM CURRENT USE OF INSULIN (H): ICD-10-CM

## 2021-09-17 DIAGNOSIS — I10 HTN, GOAL BELOW 140/90: ICD-10-CM

## 2021-09-17 DIAGNOSIS — E78.5 HYPERLIPIDEMIA LDL GOAL <100: ICD-10-CM

## 2021-09-22 ENCOUNTER — OFFICE VISIT (OUTPATIENT)
Dept: FAMILY MEDICINE | Facility: CLINIC | Age: 81
End: 2021-09-22
Payer: COMMERCIAL

## 2021-09-22 VITALS
TEMPERATURE: 98 F | HEART RATE: 76 BPM | WEIGHT: 187 LBS | SYSTOLIC BLOOD PRESSURE: 122 MMHG | DIASTOLIC BLOOD PRESSURE: 70 MMHG | BODY MASS INDEX: 29.35 KG/M2 | HEIGHT: 67 IN

## 2021-09-22 DIAGNOSIS — I10 HTN, GOAL BELOW 140/90: ICD-10-CM

## 2021-09-22 DIAGNOSIS — E11.21 TYPE 2 DIABETES MELLITUS WITH DIABETIC NEPHROPATHY, WITHOUT LONG-TERM CURRENT USE OF INSULIN (H): Primary | ICD-10-CM

## 2021-09-22 DIAGNOSIS — Z23 NEED FOR PROPHYLACTIC VACCINATION AND INOCULATION AGAINST INFLUENZA: ICD-10-CM

## 2021-09-22 DIAGNOSIS — E78.5 HYPERLIPIDEMIA LDL GOAL <100: ICD-10-CM

## 2021-09-22 DIAGNOSIS — N40.1 BPH WITH OBSTRUCTION/LOWER URINARY TRACT SYMPTOMS: ICD-10-CM

## 2021-09-22 DIAGNOSIS — N13.8 BPH WITH OBSTRUCTION/LOWER URINARY TRACT SYMPTOMS: ICD-10-CM

## 2021-09-22 LAB
ANION GAP SERPL CALCULATED.3IONS-SCNC: 4 MMOL/L (ref 3–14)
BUN SERPL-MCNC: 16 MG/DL (ref 7–30)
CALCIUM SERPL-MCNC: 9.2 MG/DL (ref 8.5–10.1)
CHLORIDE BLD-SCNC: 103 MMOL/L (ref 94–109)
CO2 SERPL-SCNC: 33 MMOL/L (ref 20–32)
CREAT SERPL-MCNC: 0.9 MG/DL (ref 0.66–1.25)
CREAT UR-MCNC: 144 MG/DL
GFR SERPL CREATININE-BSD FRML MDRD: 81 ML/MIN/1.73M2
GLUCOSE BLD-MCNC: 122 MG/DL (ref 70–99)
HBA1C MFR BLD: 6.3 % (ref 0–5.6)
MICROALBUMIN UR-MCNC: 30 MG/L
MICROALBUMIN/CREAT UR: 20.83 MG/G CR (ref 0–17)
POTASSIUM BLD-SCNC: 4.1 MMOL/L (ref 3.4–5.3)
SODIUM SERPL-SCNC: 140 MMOL/L (ref 133–144)

## 2021-09-22 PROCEDURE — 80048 BASIC METABOLIC PNL TOTAL CA: CPT | Performed by: FAMILY MEDICINE

## 2021-09-22 PROCEDURE — 83036 HEMOGLOBIN GLYCOSYLATED A1C: CPT | Performed by: FAMILY MEDICINE

## 2021-09-22 PROCEDURE — 36415 COLL VENOUS BLD VENIPUNCTURE: CPT | Performed by: FAMILY MEDICINE

## 2021-09-22 PROCEDURE — 99214 OFFICE O/P EST MOD 30 MIN: CPT | Mod: 25 | Performed by: FAMILY MEDICINE

## 2021-09-22 PROCEDURE — 82043 UR ALBUMIN QUANTITATIVE: CPT | Performed by: FAMILY MEDICINE

## 2021-09-22 PROCEDURE — 90662 IIV NO PRSV INCREASED AG IM: CPT | Performed by: FAMILY MEDICINE

## 2021-09-22 PROCEDURE — G0008 ADMIN INFLUENZA VIRUS VAC: HCPCS | Performed by: FAMILY MEDICINE

## 2021-09-22 RX ORDER — HYDROCHLOROTHIAZIDE 25 MG/1
25 TABLET ORAL DAILY
Qty: 90 TABLET | Refills: 3 | Status: SHIPPED | OUTPATIENT
Start: 2021-09-22 | End: 2022-09-14

## 2021-09-22 RX ORDER — METOPROLOL SUCCINATE 50 MG/1
50 TABLET, EXTENDED RELEASE ORAL DAILY
Qty: 90 TABLET | Refills: 3 | Status: SHIPPED | OUTPATIENT
Start: 2021-09-22 | End: 2022-09-14

## 2021-09-22 RX ORDER — LISINOPRIL 40 MG/1
40 TABLET ORAL DAILY
Qty: 90 TABLET | Refills: 3 | Status: SHIPPED | OUTPATIENT
Start: 2021-09-22 | End: 2022-09-28

## 2021-09-22 RX ORDER — GLIPIZIDE 10 MG/1
10 TABLET, FILM COATED, EXTENDED RELEASE ORAL DAILY
Qty: 90 TABLET | Refills: 3 | Status: SHIPPED | OUTPATIENT
Start: 2021-09-22 | End: 2022-09-07

## 2021-09-22 RX ORDER — ATORVASTATIN CALCIUM 40 MG/1
40 TABLET, FILM COATED ORAL DAILY
Qty: 90 TABLET | Refills: 3 | Status: SHIPPED | OUTPATIENT
Start: 2021-09-22 | End: 2022-09-26

## 2021-09-22 RX ORDER — TAMSULOSIN HYDROCHLORIDE 0.4 MG/1
0.4 CAPSULE ORAL DAILY
Qty: 30 CAPSULE | Refills: 1 | Status: SHIPPED | OUTPATIENT
Start: 2021-09-22 | End: 2021-11-18

## 2021-09-22 RX ORDER — GLIPIZIDE 10 MG/1
TABLET, FILM COATED, EXTENDED RELEASE ORAL
Qty: 90 TABLET | Refills: 0 | OUTPATIENT
Start: 2021-09-22

## 2021-09-22 RX ORDER — ORAL SEMAGLUTIDE 7 MG/1
7 TABLET ORAL DAILY
Qty: 90 TABLET | Refills: 3 | Status: SHIPPED | OUTPATIENT
Start: 2021-09-22 | End: 2022-03-18

## 2021-09-22 RX ORDER — HYDROCHLOROTHIAZIDE 25 MG/1
TABLET ORAL
Qty: 90 TABLET | Refills: 0 | OUTPATIENT
Start: 2021-09-22

## 2021-09-22 RX ORDER — ATORVASTATIN CALCIUM 40 MG/1
TABLET, FILM COATED ORAL
Qty: 90 TABLET | Refills: 0 | OUTPATIENT
Start: 2021-09-22

## 2021-09-22 RX ORDER — METOPROLOL SUCCINATE 50 MG/1
TABLET, EXTENDED RELEASE ORAL
Qty: 90 TABLET | Refills: 0 | OUTPATIENT
Start: 2021-09-22

## 2021-09-22 ASSESSMENT — MIFFLIN-ST. JEOR: SCORE: 1513.92

## 2021-09-22 NOTE — PROGRESS NOTES
"DM2: fills and labs.  Doing well  Htn: stable on meds.  Fills and labs  BpH: wants to try flomax.  Some hesitancy, some urgency at times  Hyperlipidemia: statin, fills    Fills.  Add flomax. Labs.  Call for long term fills on flomax if working well, OK to stop otherwise.     F/u in 6 mo.  Doing well.        S: Cecilio Navarro is a 81 year old male with     DM2: fills and labs.  Doing well  Htn: stable on meds.  Fills and labs  BpH: wants to try flomax.  Some hesitancy, some urgency at times  Hyperlipidemia: statin, fills      O:/70   Pulse 76   Temp 98  F (36.7  C) (Tympanic)   Ht 1.689 m (5' 6.5\")   Wt 84.8 kg (187 lb)   BMI 29.73 kg/m    GEN: Alert and oriented, in no acute distress    "

## 2021-09-30 ENCOUNTER — IMMUNIZATION (OUTPATIENT)
Dept: FAMILY MEDICINE | Facility: CLINIC | Age: 81
End: 2021-09-30
Payer: COMMERCIAL

## 2021-09-30 PROCEDURE — 91300 PR COVID VAC PFIZER DIL RECON 30 MCG/0.3 ML IM: CPT

## 2021-09-30 PROCEDURE — 0004A PR COVID VAC PFIZER DIL RECON 30 MCG/0.3 ML IM: CPT

## 2021-10-01 ENCOUNTER — APPOINTMENT (OUTPATIENT)
Dept: CT IMAGING | Facility: CLINIC | Age: 81
End: 2021-10-01
Attending: EMERGENCY MEDICINE
Payer: MEDICARE

## 2021-10-01 ENCOUNTER — HOSPITAL ENCOUNTER (EMERGENCY)
Facility: CLINIC | Age: 81
Discharge: HOME | End: 2021-10-01
Attending: EMERGENCY MEDICINE | Admitting: EMERGENCY MEDICINE
Payer: MEDICARE

## 2021-10-01 ENCOUNTER — APPOINTMENT (OUTPATIENT)
Dept: GENERAL RADIOLOGY | Facility: CLINIC | Age: 81
End: 2021-10-01
Attending: EMERGENCY MEDICINE
Payer: MEDICARE

## 2021-10-01 VITALS
TEMPERATURE: 98.1 F | BODY MASS INDEX: 28.41 KG/M2 | WEIGHT: 181 LBS | DIASTOLIC BLOOD PRESSURE: 81 MMHG | HEART RATE: 73 BPM | OXYGEN SATURATION: 100 % | HEIGHT: 67 IN | SYSTOLIC BLOOD PRESSURE: 126 MMHG | RESPIRATION RATE: 16 BRPM

## 2021-10-01 DIAGNOSIS — S61.239A PUNCTURE WOUND OF FINGER, INITIAL ENCOUNTER: ICD-10-CM

## 2021-10-01 PROCEDURE — 90471 IMMUNIZATION ADMIN: CPT | Performed by: EMERGENCY MEDICINE

## 2021-10-01 PROCEDURE — 73130 X-RAY EXAM OF HAND: CPT | Mod: RT

## 2021-10-01 PROCEDURE — 250N000011 HC RX IP 250 OP 636: Performed by: EMERGENCY MEDICINE

## 2021-10-01 PROCEDURE — 99284 EMERGENCY DEPT VISIT MOD MDM: CPT | Mod: 25 | Performed by: EMERGENCY MEDICINE

## 2021-10-01 PROCEDURE — 90715 TDAP VACCINE 7 YRS/> IM: CPT | Performed by: EMERGENCY MEDICINE

## 2021-10-01 PROCEDURE — 99282 EMERGENCY DEPT VISIT SF MDM: CPT | Performed by: EMERGENCY MEDICINE

## 2021-10-01 PROCEDURE — 70450 CT HEAD/BRAIN W/O DYE: CPT

## 2021-10-01 RX ADMIN — CLOSTRIDIUM TETANI TOXOID ANTIGEN (FORMALDEHYDE INACTIVATED), CORYNEBACTERIUM DIPHTHERIAE TOXOID ANTIGEN (FORMALDEHYDE INACTIVATED), BORDETELLA PERTUSSIS TOXOID ANTIGEN (GLUTARALDEHYDE INACTIVATED), BORDETELLA PERTUSSIS FILAMENTOUS HEMAGGLUTININ ANTIGEN (FORMALDEHYDE INACTIVATED), BORDETELLA PERTUSSIS PERTACTIN ANTIGEN, AND BORDETELLA PERTUSSIS FIMBRIAE 2/3 ANTIGEN 0.5 ML: 5; 2; 2.5; 5; 3; 5 INJECTION, SUSPENSION INTRAMUSCULAR at 13:11

## 2021-10-01 ASSESSMENT — ENCOUNTER SYMPTOMS
CONFUSION: 0
HEADACHES: 0
FEVER: 0
BRUISES/BLEEDS EASILY: 0

## 2021-10-01 ASSESSMENT — MIFFLIN-ST. JEOR: SCORE: 1494.64

## 2021-10-01 NOTE — ED PROVIDER NOTES
"  History     Chief Complaint   Patient presents with     Laceration     right middle finger - drilled into finger - then had a syncopal event afterward - EMS arrived and patient came to ER with wife     Syncope     HPI  Cecilio Navarro is a 81 year old male who with hand injury and head trauma.  The patient was operating a drill and accidentally drilled his right middle finger the proximal phalanx on the palmar side.  It did not go through to the dorsum.  He withdrew the drill and bleeding was controlled.  Few minutes later he felt lightheaded and \"almost passed out\".  His friend caught him but he did strike the front of his head when falling.  He has no chest pain.  He does not take blood thinners.  No fevers.  Says his pain is well controlled at this time.    Allergies:  Allergies   Allergen Reactions     Amoxicillin Itching and Rash       Problem List:    Patient Active Problem List    Diagnosis Date Noted     Type 2 diabetes mellitus with diabetic nephropathy, without long-term current use of insulin (H) 12/15/2016     Priority: Medium     Advanced directives, counseling/discussion 02/28/2014     Priority: Medium     Had discussion with pt and does not have an advanced directive at this time but does understand that he does need one.    Charlotte Peters CMA                   Hydrocele of testis 12/16/2013     Priority: Medium     BPH with obstruction/lower urinary tract symptoms 04/08/2013     Priority: Medium     HYPERLIPIDEMIA LDL GOAL <100 10/31/2010     Priority: Medium     Testosterone deficiency 11/18/2009     Priority: Medium     188 on total testosterone lab 10/28/09.  Tried testosterone, didn't think it helped.  Stopped.         Impotence of Organic Origin 09/25/2006     Priority: Medium     Sex drive poor.  Low testosterone too.  Testosterone replacement didn't help.  No luck with PDE inhibitors.  Caverject didn't help.  Trying pump.         HTN, goal below 140/90 01/30/2006     Priority: Medium    "     Past Medical History:    Past Medical History:   Diagnosis Date     Actinic keratoses      Basal cell carcinoma      Type II or unspecified type diabetes mellitus without mention of complication, not stated as uncontrolled      Unspecified disorder of male genital organs      Unspecified essential hypertension        Past Surgical History:    Past Surgical History:   Procedure Laterality Date     COLONOSCOPY  10/16/2013    Diverticulosis in sigmoid colon; repeat in 5 years; Surgeon: Garry Ambrose MD;  Location: [facility]     HC REMOVE TONSILS/ADENOIDS,<13 Y/O         Family History:    Family History   Problem Relation Age of Onset     Hypertension Mother      Cerebrovascular Disease Mother         mentally affected     Cancer - colorectal Father         mets to liver  age74     Gastrointestinal Disease Brother         multilple colon polyps     Hypertension Brother      Gastrointestinal Disease Sister         reflux     Obesity Son      Allergies Daughter      Arthritis Brother      Melanoma No family hx of        Social History:  Marital Status:   [2]  Social History     Tobacco Use     Smoking status: Never Smoker     Smokeless tobacco: Never Used   Substance Use Topics     Alcohol use: Yes     Comment: occasional social     Drug use: No        Medications:    aspirin 81 MG tablet  atorvastatin (LIPITOR) 40 MG tablet  blood glucose (NO BRAND SPECIFIED) test strip  blood glucose monitoring (SOFTCLIX) lancets  ciclopirox (LOPROX) 0.77 % cream  desonide (DESOWEN) 0.05 % external cream  glipiZIDE (GLUCOTROL XL) 10 MG 24 hr tablet  hydrochlorothiazide (HYDRODIURIL) 25 MG tablet  Ibuprofen (ADVIL) 200 MG capsule  lisinopril (ZESTRIL) 40 MG tablet  metFORMIN (GLUCOPHAGE) 1000 MG tablet  metoprolol succinate ER (TOPROL-XL) 50 MG 24 hr tablet  Semaglutide (RYBELSUS) 7 MG TABS  sildenafil (VIAGRA) 100 MG cap/tab  tamsulosin (FLOMAX) 0.4 MG capsule  vardenafil (LEVITRA) 20 MG tablet          Review of  "Systems   Constitutional: Negative for fever.   Musculoskeletal:        Injury to volar aspect of r middle digit   Neurological: Negative for headaches.        +near syncope   Hematological: Does not bruise/bleed easily.   Psychiatric/Behavioral: Negative for confusion.   All other systems reviewed and are negative.      Physical Exam   BP: 126/81  Pulse: 73  Temp: 98.1  F (36.7  C)  Resp: 16  Height: 170.2 cm (5' 7\")  Weight: 82.1 kg (181 lb)  SpO2: 100 %      Physical Exam  Vitals reviewed.   Constitutional:       General: He is not in acute distress.  HENT:      Head: Normocephalic.      Right Ear: External ear normal.      Left Ear: External ear normal.      Nose: No congestion.      Mouth/Throat:      Pharynx: No oropharyngeal exudate.   Eyes:      Extraocular Movements: Extraocular movements intact.   Cardiovascular:      Rate and Rhythm: Normal rate.   Pulmonary:      Effort: No respiratory distress.   Abdominal:      General: There is no distension.   Musculoskeletal:         General: No swelling.      Cervical back: No rigidity.      Comments: Puncture wound of R volar proximal phalanx.   Interosseus muscle intact on Right  No scaphoid TTP  Extensor intact  No dorsal wound  FDP, FDS intact in R middle digit  No bleeding   Skin:     Capillary Refill: Capillary refill takes less than 2 seconds.      Coloration: Skin is not jaundiced.   Neurological:      General: No focal deficit present.   Psychiatric:      Comments: Very nice         ED Course     ED Course as of Oct 01 1345   Fri Oct 01, 2021   1331 Head ct w/o acute findings. Xr hand w/o fracture.       1340 The patient does not want sutures.  On reexamination the wound is very superficial and I do not think it would be amenable to closing.  There is also risk of trapping bacteria.  Given this, I think not closing is reasonable.  I did apply bacitracin and put a Band-Aid on it.  I gave him bacitracin to go home with as well as Band-Aids and told him to " change it every day until it heals.  His range of motion is intact.  There is no bleeding.  Feels very well and would like to go home.  I gave him strict ER precautions and both he and his wife are comfortable with these.  I am going to discharge him at this time.        Procedures               Results for orders placed or performed during the hospital encounter of 10/01/21 (from the past 24 hour(s))   XR Hand Right G/E 3 Views    Narrative    XR HAND RT G/E 3 VW 10/1/2021 12:59 PM     HISTORY: drill bit to right middle finger      Impression    IMPRESSION: IP joint degenerative changes and ossicles adjacent to the  second metacarpal phalangeal joint which may be degenerative or  related to old injury. Tibia trapezium trapezoid joint space  narrowing. No evidence of acute fracture.    MATTHEW BLANC MD         SYSTEM ID:  [id number]   Head CT w/o contrast    Narrative    CT OF THE HEAD WITHOUT CONTRAST 10/1/2021 1:00 PM     COMPARISON: None    HISTORY: Facial trauma; hit head after syncope after drilling hand.    TECHNIQUE: 5 mm thick axial CT images of the head were acquired  without IV contrast material.    FINDINGS: There is moderate diffuse cerebral volume loss. There are  extensive confluent areas of decreased density in the cerebral white  matter bilaterally that are consistent with sequela of chronic small  vessel ischemic disease.    The ventricles and basal cisterns are within normal limits in  configuration given the degree of cerebral volume loss. There is no  midline shift. There are no extra-axial fluid collections.    No intracranial hemorrhage, mass or recent infarct.    The visualized paranasal sinuses are well-aerated. There is no  mastoiditis. There are no fractures of the visualized bones.      Impression    IMPRESSION: Diffuse cerebral volume loss and cerebral white matter  changes consistent with chronic small vessel ischemic disease. No  evidence for acute intracranial pathology.      Radiation  dose for this scan was reduced using automated exposure  control, adjustment of the mA and/or kV according to patient size, or  iterative reconstruction technique    JANENE RIVERA MD         SYSTEM ID:  [id number]       Medications   Tdap (tetanus-diphtheria-acell pertussis) (ADACEL) injection 0.5 mL (0.5 mLs Intramuscular Given 10/1/21 1311)       Assessments & Plan (with Medical Decision Making)     I suspect the patient has intact tendons.  His hand exam is reassuring.  I am going to get an x-ray of the hand and look for foreign body or fracture.  There is no bleeding at this time.  He does not want pain medicine.  I suspect he had a vasovagal event in the setting of the trauma.  I do not think I need to pursue this.  I am going to get a head CT out of an abundance of caution even though he does not take blood thinners.  I strongly doubt a cardiac etiology of the patient's syncope.  I placed his hand in Betadine and saline in the room to clean it.  I will also update his Tdap.    I have reviewed the nursing notes.    I have reviewed the findings, diagnosis, plan and need for follow up with the patient.    New Prescriptions    No medications on file       Final diagnoses:   Puncture wound of finger, initial encounter       10/1/2021   [facility] EMERGENCY DEPT     Juan M De La Cruz MD  10/01/21 [id number]

## 2021-10-01 NOTE — DISCHARGE INSTRUCTIONS
You were seen today for a puncture wound to your right middle finger. The Xray and your hand exam were reassuring. There is no fracture.  Your head CT was normal.    Please change the Band-Aid every day.  Please keep putting antibiotic ointment on the wound until it heals.  You can also buy antibiotic ointment over-the-counter.  Please be sure to inspect the wound every day to make sure there are no concering changes.    Please follow up with your doctor next week.    If anything gets worse or you have any new concerns, please come back.

## 2021-10-11 ENCOUNTER — OFFICE VISIT (OUTPATIENT)
Dept: FAMILY MEDICINE | Facility: CLINIC | Age: 81
End: 2021-10-11
Payer: COMMERCIAL

## 2021-10-11 VITALS
HEIGHT: 67 IN | SYSTOLIC BLOOD PRESSURE: 128 MMHG | RESPIRATION RATE: 16 BRPM | WEIGHT: 188 LBS | DIASTOLIC BLOOD PRESSURE: 72 MMHG | TEMPERATURE: 97.7 F | HEART RATE: 76 BPM | BODY MASS INDEX: 29.51 KG/M2 | OXYGEN SATURATION: 96 %
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
## 2021-10-11 DIAGNOSIS — M79.644 PAIN OF FINGER OF RIGHT HAND: Primary | ICD-10-CM

## 2021-10-11 PROCEDURE — 99213 OFFICE O/P EST LOW 20 MIN: CPT | Performed by: FAMILY MEDICINE

## 2021-10-11 ASSESSMENT — MIFFLIN-ST. JEOR: SCORE: 1526.39

## 2021-10-11 NOTE — PROGRESS NOTES
"        Subjective   Cecilio Navarro is a 81 year old who presents for the following health issues    HPI       A: Finger injury, improved        Fall, recovered, no secondary cause suspected    P: reassured him on CT, fall.  Continue to let finger heal.  F/u prn.        ED/UC Followup:    Facility:  [facility]   Date of visit: 10/1/2021  Reason for visit: laceration of right middle finger, syncope event after   Current Status: finger has improved, scabbed over, no pain        Fall: thought to be related to blood, stress, e tc.  Ct head negative.  Fine since being home    Finger healing well, crusted over , not red, no pus/drainage.                Objective    /72   Pulse 76   Temp 97.7  F (36.5  C) (Tympanic)   Resp 16   Ht 1.702 m (5' 7\")   Wt 85.3 kg (188 lb)   SpO2 96%   BMI 29.44 kg/m    Body mass index is 29.44 kg/m .  Finger healing well, crusted healing area, not hot/red.  No drainage.  Is dry  Gait fine.  No balance issues, history is fine, no confused or acting oddly            "

## 2021-11-02 ENCOUNTER — VIRTUAL VISIT (OUTPATIENT)
Dept: EDUCATION SERVICES | Facility: CLINIC | Age: 81
End: 2021-11-02
Payer: COMMERCIAL

## 2021-11-02 DIAGNOSIS — E11.21 TYPE 2 DIABETES MELLITUS WITH DIABETIC NEPHROPATHY, WITHOUT LONG-TERM CURRENT USE OF INSULIN (H): Primary | ICD-10-CM

## 2021-11-02 PROCEDURE — G0108 DIAB MANAGE TRN  PER INDIV: HCPCS | Performed by: DIETITIAN, REGISTERED

## 2021-11-02 NOTE — PATIENT INSTRUCTIONS
1.  Make sure you have a large glass of water with each meal.    2.  Get some glucose tablets to carry with you when are gone from the house.  I sent you a form on low blood sugars.    3.  Continue to watch meal portions.

## 2021-11-02 NOTE — PROGRESS NOTES
"Diabetes Self-Management Education & Support    Presents for: Individual review, via phone    SUBJECTIVE/OBJECTIVE:  Presents for: Individual review  Accompanied by: Self  Diabetes type: Type 2  Cultural Influences/Ethnic Background:  Not  or       Diabetes Symptoms & Complications:          Patient Problem List and Family Medical History reviewed for relevant medical history, current medical status, and diabetes risk factors.    Vitals:  There were no vitals taken for this visit.  Estimated body mass index is 29.44 kg/m  as calculated from the following:    Height as of 10/11/21: 1.702 m (5' 7\").    Weight as of 10/11/21: 85.3 kg (188 lb).   Last 3 BP:   BP Readings from Last 3 Encounters:   10/11/21 128/72   10/01/21 126/81   09/22/21 122/70       History   Smoking Status     Never Smoker   Smokeless Tobacco     Never Used       Labs:  Lab Results   Component Value Date    A1C 6.3 09/22/2021    A1C 7.0 05/10/2021     Lab Results   Component Value Date     09/22/2021     11/07/2019     Lab Results   Component Value Date    LDL 35 09/16/2020     HDL Cholesterol   Date Value Ref Range Status   09/16/2020 43 >39 mg/dL Final   ]  GFR Estimate   Date Value Ref Range Status   09/22/2021 81 >60 mL/min/1.73m2 Final     Comment:     As of July 11, 2021, eGFR is calculated by the CKD-EPI creatinine equation, without race adjustment. eGFR can be influenced by muscle mass, exercise, and diet. The reported eGFR is an estimation only and is only applicable if the renal function is stable.   11/07/2019 82 >60 mL/min/[1.73_m2] Final     Comment:     Non  GFR Calc  Starting 12/18/2018, serum creatinine based estimated GFR (eGFR) will be   calculated using the Chronic Kidney Disease Epidemiology Collaboration   (CKD-EPI) equation.       GFR Estimate If Black   Date Value Ref Range Status   11/07/2019 >90 >60 mL/min/[1.73_m2] Final     Comment:      GFR Calc  Starting " 12/18/2018, serum creatinine based estimated GFR (eGFR) will be   calculated using the Chronic Kidney Disease Epidemiology Collaboration   (CKD-EPI) equation.       Lab Results   Component Value Date    CR 0.90 09/22/2021    CR 0.90 11/07/2019     No results found for: MICROALBUMIN    Healthy Eating:  Healthy Eating Assessed Today: Yes  Breakfast: cereal or eggs sunday and saturday oatmeal  Lunch: soup: broccoli cheese, 2 crackers, diet pop  Dinner: steak, tomatoes, bean salad, cauliflower.  Other: 137, 157, 151, 128, 155, 148, 156, 139, 158, 140, 137, 155, 161, 153, 158, 136, 124, 130, 124, 114, 123, ,125, 131, 131, 137, 129, 139, 184, 138, 151    Being Active:   mowing lawns, puttering outside, helping neighbors    Monitoring:   see above        Taking Medications:  Diabetes Medication(s)     Biguanides       metFORMIN (GLUCOPHAGE) 1000 MG tablet    Take 1 tablet (1,000 mg) by mouth 2 times daily (with meals)    Sulfonylureas       glipiZIDE (GLUCOTROL XL) 10 MG 24 hr tablet    Take 1 tablet (10 mg) by mouth daily    Incretin Mimetic Agents (GLP-1 Receptor Agonists)       Semaglutide (RYBELSUS) 7 MG TABS    Take 7 mg by mouth daily Take in am on empty stomach, no other meds/food for 30 minutes.               Problem Solving:   not assessed              Reducing Risks:   not assessed    Healthy Coping:     Patient Activation Measure Survey Score:  REMINGTON Score (Last Two) 11/15/2010   REMINGTON Raw Score 52   Activation Score 100   REMINGTON Level 4       Diabetes knowledge and skills assessment:   Patient is knowledgeable in diabetes management concepts related to: Healthy Eating, Being Active, Monitoring and Taking Medication    Patient needs further education on the following diabetes management concepts: Healthy Eating, Being Active, Monitoring, Taking Medication, Problem Solving, Reducing Risks and Healthy Coping    Based on learning assessment above, most appropriate setting for further diabetes education would be:  Individual setting.      INTERVENTIONS:    Education provided today on:  AADE Self-Care Behaviors:  Healthy Eating: consistency in amount, composition, and timing of food intake  Being Active: relationship to blood glucose and describe appropriate activity program  Problem Solving: low blood glucose - causes, signs/symptoms, treatment and prevention and carrying a carbohydrate source at all times    Opportunities for ongoing education and support in diabetes-self management were discussed.    Pt verbalized understanding of concepts discussed and recommendations provided today.       Education Materials Provided:  Hypoglycemia Signs and Symptoms      ASSESSMENT:  -having some spell of dizziness, he will check a BG level when feeling dizzy.  Went over how to use glucose tablets, sent paper on low blood sugar to pt.  -a1c 6.3%, wt 188#, doing well.  -still watching portion and trying to do more vegetables with meals.        Patient's most recent   Lab Results   Component Value Date    A1C 6.3 09/22/2021    A1C 7.0 05/10/2021    is meeting goal of <7.0    PLAN  See Patient Instructions for co-developed, patient-stated behavior change goals.  AVS printed and provided to patient today. See Follow-Up section for recommended follow-up.      Time Spent: 60 minutes  Encounter Type: Individual    Any diabetes medication dose changes were made via the CDE Protocol and Collaborative Practice Agreement with the patient's primary care provider. A copy of this encounter was shared with the provider.

## 2021-11-18 DIAGNOSIS — N40.1 BPH WITH OBSTRUCTION/LOWER URINARY TRACT SYMPTOMS: ICD-10-CM

## 2021-11-18 DIAGNOSIS — N13.8 BPH WITH OBSTRUCTION/LOWER URINARY TRACT SYMPTOMS: ICD-10-CM

## 2021-11-18 RX ORDER — TAMSULOSIN HYDROCHLORIDE 0.4 MG/1
CAPSULE ORAL
Qty: 30 CAPSULE | Refills: 5 | Status: SHIPPED | OUTPATIENT
Start: 2021-11-18 | End: 2022-05-19

## 2022-01-06 ENCOUNTER — TRANSFERRED RECORDS (OUTPATIENT)
Dept: HEALTH INFORMATION MANAGEMENT | Facility: CLINIC | Age: 82
End: 2022-01-06
Payer: COMMERCIAL

## 2022-01-06 LAB
RETINOPATHY: NORMAL
RETINOPATHY: POSITIVE

## 2022-02-01 ENCOUNTER — OFFICE VISIT (OUTPATIENT)
Dept: DERMATOLOGY | Facility: CLINIC | Age: 82
End: 2022-02-01
Payer: COMMERCIAL

## 2022-02-01 VITALS — DIASTOLIC BLOOD PRESSURE: 82 MMHG | OXYGEN SATURATION: 100 % | HEART RATE: 72 BPM | SYSTOLIC BLOOD PRESSURE: 137 MMHG

## 2022-02-01 DIAGNOSIS — D23.9 DERMAL NEVUS: ICD-10-CM

## 2022-02-01 DIAGNOSIS — D18.01 ANGIOMA OF SKIN: ICD-10-CM

## 2022-02-01 DIAGNOSIS — L82.1 SEBORRHEIC KERATOSIS: ICD-10-CM

## 2022-02-01 DIAGNOSIS — Z85.828 HISTORY OF SKIN CANCER: ICD-10-CM

## 2022-02-01 DIAGNOSIS — L57.0 AK (ACTINIC KERATOSIS): ICD-10-CM

## 2022-02-01 DIAGNOSIS — L81.4 LENTIGO: Primary | ICD-10-CM

## 2022-02-01 PROCEDURE — 17003 DESTRUCT PREMALG LES 2-14: CPT | Performed by: DERMATOLOGY

## 2022-02-01 PROCEDURE — 99213 OFFICE O/P EST LOW 20 MIN: CPT | Mod: 25 | Performed by: DERMATOLOGY

## 2022-02-01 PROCEDURE — 17000 DESTRUCT PREMALG LESION: CPT | Performed by: DERMATOLOGY

## 2022-02-01 NOTE — LETTER
2/1/2022         RE: Cecilio Navarro  [address] [facility] [id number]        Dear Colleague,    Thank you for referring your patient, Cecilio Navarro, to the [facility]. Please see a copy of my visit note below.    Cecilio Navarro is an extremely pleasant 81 year old year old male patient here today for hx of non-melanoma skin cancer.  HE notes rough spots on face.   .   Patient states this has been present for a while.  Patient reports the following symptoms:  scale.  Patient reports the following previous treatments none.  These treatments did not work.  Patient reports the following modifying factors none.  Associated symptoms: none.  Patient has no other skin complaints today.  Remainder of the HPI, Meds, PMH, Allergies, FH, and SH was reviewed in chart.      Past Medical History:   Diagnosis Date     Actinic keratoses      Basal cell carcinoma      Type II or unspecified type diabetes mellitus without mention of complication, not stated as uncontrolled      Unspecified disorder of male genital organs      Unspecified essential hypertension        Past Surgical History:   Procedure Laterality Date     COLONOSCOPY  10/16/2013    Diverticulosis in sigmoid colon; repeat in 5 years; Surgeon: Garry Ambrose MD;  Location: [facility]     HC REMOVE TONSILS/ADENOIDS,<13 Y/O          Family History   Problem Relation Age of Onset     Hypertension Mother      Cerebrovascular Disease Mother         mentally affected     Cancer - colorectal Father         mets to liver  age76     Gastrointestinal Disease Brother         multilple colon polyps     Hypertension Brother      Gastrointestinal Disease Sister         reflux     Obesity Son      Allergies Daughter      Arthritis Brother      Melanoma No family hx of        Social History     Socioeconomic History     Marital status:      Spouse name: Not on file     Number of children: Not on file     Years of education: Not on file      Highest education level: Not on file   Occupational History     Not on file   Tobacco Use     Smoking status: Never Smoker     Smokeless tobacco: Never Used   Substance and Sexual Activity     Alcohol use: Yes     Comment: occasional social     Drug use: No     Sexual activity: Yes     Partners: Female   Other Topics Concern      Service No     Blood Transfusions No     Caffeine Concern Yes     Comment: 1-2 day     Occupational Exposure No     Hobby Hazards Yes     Comment: [employer]     Sleep Concern No     Stress Concern No     Weight Concern No     Special Diet Yes     Comment: Diabetic and low fat     Back Care No     Exercise Yes     Bike Helmet Not Asked     Seat Belt Yes     Self-Exams Yes     Comment: check blood sugars     Parent/sibling w/ CABG, MI or angioplasty before 65F 55M? Not Asked   Social History Narrative     Not on file     Social Determinants of Health     Financial Resource Strain: Not on file   Food Insecurity: Not on file   Transportation Needs: Not on file   Physical Activity: Not on file   Stress: Not on file   Social Connections: Not on file   Intimate Partner Violence: Not on file   Housing Stability: Not on file       Outpatient Encounter Medications as of 2/1/2022   Medication Sig Dispense Refill     aspirin 81 MG tablet Take 81 mg by mouth daily       atorvastatin (LIPITOR) 40 MG tablet Take 1 tablet (40 mg) by mouth daily 90 tablet 3     blood glucose (NO BRAND SPECIFIED) test strip Use to test blood sugar 1 times daily.  Accu-chek guide me strips. 100 strip 5     blood glucose monitoring (SOFTCLIX) lancets Use to test blood sugar 1 times daily 100 each 5     glipiZIDE (GLUCOTROL XL) 10 MG 24 hr tablet Take 1 tablet (10 mg) by mouth daily 90 tablet 3     hydrochlorothiazide (HYDRODIURIL) 25 MG tablet Take 1 tablet (25 mg) by mouth daily 90 tablet 3     Ibuprofen (ADVIL) 200 MG capsule Take 400 mg by mouth 2 times daily as needed.       lisinopril (ZESTRIL) 40 MG tablet Take 1  tablet (40 mg) by mouth daily 90 tablet 3     metFORMIN (GLUCOPHAGE) 1000 MG tablet Take 1 tablet (1,000 mg) by mouth 2 times daily (with meals) 180 tablet 3     metoprolol succinate ER (TOPROL-XL) 50 MG 24 hr tablet Take 1 tablet (50 mg) by mouth daily 90 tablet 3     Semaglutide (RYBELSUS) 7 MG TABS Take 7 mg by mouth daily Take in am on empty stomach, no other meds/food for 30 minutes. 90 tablet 3     sildenafil (VIAGRA) 100 MG cap/tab Take 1 tablet (100 mg) by mouth daily as needed for erectile dysfunction 3 tablet 11     tamsulosin (FLOMAX) 0.4 MG capsule Take 1 capsule by mouth once daily 30 capsule 5     vardenafil (LEVITRA) 20 MG tablet Take 1 tablet (20 mg) by mouth daily as needed for erectile dysfunction 5 tablet 11     ciclopirox (LOPROX) 0.77 % cream Daily at bedtime to face (Patient not taking: Reported on 5/10/2021) 90 g 3     desonide (DESOWEN) 0.05 % external cream Every morning to face (Patient not taking: Reported on 5/10/2021) 60 g 3     No facility-administered encounter medications on file as of 2/1/2022.             O:   NAD, WDWN, Alert & Oriented, Mood & Affect wnl, Vitals stable   Here today alone   /82   Pulse 72   SpO2 100%    General appearance normal   Vitals stable   Alert, oriented and in no acute distress      Following lymph nodes palpated: Occipital, Cervical, Supraclavicular no lad  R scalp x2, R lip x2, L cheek x1 gritty scaly papule      Stuck on papules and brown macules on trunk and ext   Red papules on trunk  Flesh colored papules on trunk     The remainder of the full exam was normal; the following areas were examined:  conjunctiva/lids, oral mucosa, neck, peripheral vascular system, abdomen, lymph nodes, digits/nails, eccrine and apocrine glands, scalp/hair, face, neck, chest, abdomen, buttocks, back, RUE, LUE, RLE, LLE       Eyes: Conjunctivae/lids:Normal     ENT: Lips, buccal mucosa, tongue: normal    MSK:Normal    Cardiovascular: peripheral edema none    Pulm:  Breathing Normal    Lymph Nodes: No Head and Neck Lymphadenopathy     Neuro/Psych: Orientation:Alert and Orientedx3 ; Mood/Affect:normal       A/P:  1. Seborrheic keratosis, lentigo, angioma, dermal nevus, hx of non-melanoma skin cancer   2. Actinic keratosis x5  R scalp x2, R lip x2, L cheek x1 gritty scaly papule   LN2:  Treated with LN2 for 5s for 1-2 cycles. Warned risks of blistering, pain, pigment change, scarring, and incomplete resolution.  Advised patient to return if lesions do not completely resolve.  Wound care sheet given.    It was a pleasure speaking to Cecilio Navarro today.  Previous clinic notes and pertinent laboratory tests were reviewed prior to Cecilio Navarro's visit.  Nature and genetics of benign skin lesions dicussed with patient.  Signs and Symptoms of skin cancer discussed with patient.  Patient encouraged to perform monthly skin exams.  UV precautions reviewed with patient.  Risks of non-melanoma skin cancer discussed with patient   Return to clinic 12 months        Again, thank you for allowing me to participate in the care of your patient.        Sincerely,        Tejinder Jarvis MD

## 2022-02-01 NOTE — PROGRESS NOTES
Cecilio Navarro is an extremely pleasant 81 year old year old male patient here today for hx of non-melanoma skin cancer.  HE notes rough spots on face.   .   Patient states this has been present for a while.  Patient reports the following symptoms:  scale.  Patient reports the following previous treatments none.  These treatments did not work.  Patient reports the following modifying factors none.  Associated symptoms: none.  Patient has no other skin complaints today.  Remainder of the HPI, Meds, PMH, Allergies, FH, and SH was reviewed in chart.      Past Medical History:   Diagnosis Date     Actinic keratoses      Basal cell carcinoma      Type II or unspecified type diabetes mellitus without mention of complication, not stated as uncontrolled      Unspecified disorder of male genital organs      Unspecified essential hypertension        Past Surgical History:   Procedure Laterality Date     COLONOSCOPY  10/16/2013    Diverticulosis in sigmoid colon; repeat in 5 years; Surgeon: Garry Ambrose MD;  Location: [facility]     HC REMOVE TONSILS/ADENOIDS,<13 Y/O          Family History   Problem Relation Age of Onset     Hypertension Mother      Cerebrovascular Disease Mother         mentally affected     Cancer - colorectal Father         mets to liver  age76     Gastrointestinal Disease Brother         multilple colon polyps     Hypertension Brother      Gastrointestinal Disease Sister         reflux     Obesity Son      Allergies Daughter      Arthritis Brother      Melanoma No family hx of        Social History     Socioeconomic History     Marital status:      Spouse name: Not on file     Number of children: Not on file     Years of education: Not on file     Highest education level: Not on file   Occupational History     Not on file   Tobacco Use     Smoking status: Never Smoker     Smokeless tobacco: Never Used   Substance and Sexual Activity     Alcohol use: Yes     Comment: occasional social      Drug use: No     Sexual activity: Yes     Partners: Female   Other Topics Concern      Service No     Blood Transfusions No     Caffeine Concern Yes     Comment: 1-2 day     Occupational Exposure No     Hobby Hazards Yes     Comment: [employer]     Sleep Concern No     Stress Concern No     Weight Concern No     Special Diet Yes     Comment: Diabetic and low fat     Back Care No     Exercise Yes     Bike Helmet Not Asked     Seat Belt Yes     Self-Exams Yes     Comment: check blood sugars     Parent/sibling w/ CABG, MI or angioplasty before 65F 55M? Not Asked   Social History Narrative     Not on file     Social Determinants of Health     Financial Resource Strain: Not on file   Food Insecurity: Not on file   Transportation Needs: Not on file   Physical Activity: Not on file   Stress: Not on file   Social Connections: Not on file   Intimate Partner Violence: Not on file   Housing Stability: Not on file       Outpatient Encounter Medications as of 2/1/2022   Medication Sig Dispense Refill     aspirin 81 MG tablet Take 81 mg by mouth daily       atorvastatin (LIPITOR) 40 MG tablet Take 1 tablet (40 mg) by mouth daily 90 tablet 3     blood glucose (NO BRAND SPECIFIED) test strip Use to test blood sugar 1 times daily.  Accu-chek guide me strips. 100 strip 5     blood glucose monitoring (SOFTCLIX) lancets Use to test blood sugar 1 times daily 100 each 5     glipiZIDE (GLUCOTROL XL) 10 MG 24 hr tablet Take 1 tablet (10 mg) by mouth daily 90 tablet 3     hydrochlorothiazide (HYDRODIURIL) 25 MG tablet Take 1 tablet (25 mg) by mouth daily 90 tablet 3     Ibuprofen (ADVIL) 200 MG capsule Take 400 mg by mouth 2 times daily as needed.       lisinopril (ZESTRIL) 40 MG tablet Take 1 tablet (40 mg) by mouth daily 90 tablet 3     metFORMIN (GLUCOPHAGE) 1000 MG tablet Take 1 tablet (1,000 mg) by mouth 2 times daily (with meals) 180 tablet 3     metoprolol succinate ER (TOPROL-XL) 50 MG 24 hr tablet Take 1 tablet (50 mg) by mouth  daily 90 tablet 3     Semaglutide (RYBELSUS) 7 MG TABS Take 7 mg by mouth daily Take in am on empty stomach, no other meds/food for 30 minutes. 90 tablet 3     sildenafil (VIAGRA) 100 MG cap/tab Take 1 tablet (100 mg) by mouth daily as needed for erectile dysfunction 3 tablet 11     tamsulosin (FLOMAX) 0.4 MG capsule Take 1 capsule by mouth once daily 30 capsule 5     vardenafil (LEVITRA) 20 MG tablet Take 1 tablet (20 mg) by mouth daily as needed for erectile dysfunction 5 tablet 11     ciclopirox (LOPROX) 0.77 % cream Daily at bedtime to face (Patient not taking: Reported on 5/10/2021) 90 g 3     desonide (DESOWEN) 0.05 % external cream Every morning to face (Patient not taking: Reported on 5/10/2021) 60 g 3     No facility-administered encounter medications on file as of 2/1/2022.             O:   NAD, WDWN, Alert & Oriented, Mood & Affect wnl, Vitals stable   Here today alone   /82   Pulse 72   SpO2 100%    General appearance normal   Vitals stable   Alert, oriented and in no acute distress      Following lymph nodes palpated: Occipital, Cervical, Supraclavicular no lad  R scalp x2, R lip x2, L cheek x1 gritty scaly papule      Stuck on papules and brown macules on trunk and ext   Red papules on trunk  Flesh colored papules on trunk     The remainder of the full exam was normal; the following areas were examined:  conjunctiva/lids, oral mucosa, neck, peripheral vascular system, abdomen, lymph nodes, digits/nails, eccrine and apocrine glands, scalp/hair, face, neck, chest, abdomen, buttocks, back, RUE, LUE, RLE, LLE       Eyes: Conjunctivae/lids:Normal     ENT: Lips, buccal mucosa, tongue: normal    MSK:Normal    Cardiovascular: peripheral edema none    Pulm: Breathing Normal    Lymph Nodes: No Head and Neck Lymphadenopathy     Neuro/Psych: Orientation:Alert and Orientedx3 ; Mood/Affect:normal       A/P:  1. Seborrheic keratosis, lentigo, angioma, dermal nevus, hx of non-melanoma skin cancer   2. Actinic  keratosis x5  R scalp x2, R lip x2, L cheek x1 gritty scaly papule   LN2:  Treated with LN2 for 5s for 1-2 cycles. Warned risks of blistering, pain, pigment change, scarring, and incomplete resolution.  Advised patient to return if lesions do not completely resolve.  Wound care sheet given.    It was a pleasure speaking to Cecilio Navarro today.  Previous clinic notes and pertinent laboratory tests were reviewed prior to Cecilio Navarro's visit.  Nature and genetics of benign skin lesions dicussed with patient.  Signs and Symptoms of skin cancer discussed with patient.  Patient encouraged to perform monthly skin exams.  UV precautions reviewed with patient.  Risks of non-melanoma skin cancer discussed with patient   Return to clinic 12 months

## 2022-02-08 ENCOUNTER — TELEPHONE (OUTPATIENT)
Dept: EDUCATION SERVICES | Facility: CLINIC | Age: 82
End: 2022-02-08
Payer: COMMERCIAL

## 2022-02-08 NOTE — TELEPHONE ENCOUNTER
Diabetes Education Contact:    See Dr Morales in March and we will have an appt in April.    Donny Norwood RD, [facility]

## 2022-02-27 ENCOUNTER — HEALTH MAINTENANCE LETTER (OUTPATIENT)
Age: 82
End: 2022-02-27

## 2022-03-18 ENCOUNTER — OFFICE VISIT (OUTPATIENT)
Dept: FAMILY MEDICINE | Facility: CLINIC | Age: 82
End: 2022-03-18
Payer: COMMERCIAL

## 2022-03-18 VITALS
SYSTOLIC BLOOD PRESSURE: 134 MMHG | DIASTOLIC BLOOD PRESSURE: 80 MMHG | HEIGHT: 67 IN | BODY MASS INDEX: 29.66 KG/M2 | RESPIRATION RATE: 16 BRPM | OXYGEN SATURATION: 100 % | WEIGHT: 189 LBS | TEMPERATURE: 97.5 F | HEART RATE: 74 BPM

## 2022-03-18 DIAGNOSIS — I10 HTN, GOAL BELOW 140/90: ICD-10-CM

## 2022-03-18 DIAGNOSIS — E11.21 TYPE 2 DIABETES MELLITUS WITH DIABETIC NEPHROPATHY, WITHOUT LONG-TERM CURRENT USE OF INSULIN (H): Primary | ICD-10-CM

## 2022-03-18 DIAGNOSIS — Z11.59 NEED FOR HEPATITIS C SCREENING TEST: ICD-10-CM

## 2022-03-18 DIAGNOSIS — Z00.00 MEDICARE ANNUAL WELLNESS VISIT, SUBSEQUENT: ICD-10-CM

## 2022-03-18 DIAGNOSIS — E78.5 HYPERLIPIDEMIA LDL GOAL <100: ICD-10-CM

## 2022-03-18 DIAGNOSIS — Z13.220 SCREENING FOR HYPERLIPIDEMIA: ICD-10-CM

## 2022-03-18 LAB
CHOLEST SERPL-MCNC: 109 MG/DL
FASTING STATUS PATIENT QL REPORTED: YES
HBA1C MFR BLD: 6.2 % (ref 0–5.6)
HDLC SERPL-MCNC: 48 MG/DL
LDLC SERPL CALC-MCNC: 49 MG/DL
NONHDLC SERPL-MCNC: 61 MG/DL
TRIGL SERPL-MCNC: 58 MG/DL

## 2022-03-18 PROCEDURE — 99214 OFFICE O/P EST MOD 30 MIN: CPT | Mod: 25 | Performed by: FAMILY MEDICINE

## 2022-03-18 PROCEDURE — 36415 COLL VENOUS BLD VENIPUNCTURE: CPT | Performed by: FAMILY MEDICINE

## 2022-03-18 PROCEDURE — 86803 HEPATITIS C AB TEST: CPT | Performed by: FAMILY MEDICINE

## 2022-03-18 PROCEDURE — 99397 PER PM REEVAL EST PAT 65+ YR: CPT | Performed by: FAMILY MEDICINE

## 2022-03-18 PROCEDURE — 83036 HEMOGLOBIN GLYCOSYLATED A1C: CPT | Performed by: FAMILY MEDICINE

## 2022-03-18 PROCEDURE — 80061 LIPID PANEL: CPT | Performed by: FAMILY MEDICINE

## 2022-03-18 RX ORDER — ORAL SEMAGLUTIDE 7 MG/1
7 TABLET ORAL DAILY
Qty: 30 TABLET | Refills: 6 | Status: SHIPPED | OUTPATIENT
Start: 2022-03-18 | End: 2022-09-28

## 2022-03-18 ASSESSMENT — ENCOUNTER SYMPTOMS
DYSURIA: 0
FREQUENCY: 1
HEMATURIA: 0
HEARTBURN: 0
DIARRHEA: 0
HEADACHES: 0
NERVOUS/ANXIOUS: 0
EYE PAIN: 0
CONSTIPATION: 0
NAUSEA: 0
FEVER: 0
SHORTNESS OF BREATH: 0
MYALGIAS: 0
SORE THROAT: 0
COUGH: 0
PALPITATIONS: 0
CHILLS: 0
JOINT SWELLING: 0
ABDOMINAL PAIN: 0
PARESTHESIAS: 0
WEAKNESS: 0
ARTHRALGIAS: 0
DIZZINESS: 0
HEMATOCHEZIA: 0

## 2022-03-18 ASSESSMENT — PAIN SCALES - GENERAL: PAINLEVEL: NO PAIN (0)

## 2022-03-18 ASSESSMENT — ACTIVITIES OF DAILY LIVING (ADL): CURRENT_FUNCTION: NO ASSISTANCE NEEDED

## 2022-03-18 NOTE — PROGRESS NOTES
"SUBJECTIVE:   Cecilio Navarro is a 81 year old male who presents for Preventive Visit.      Patient has been advised of split billing requirements and indicates understanding: Yes  Are you in the first 12 months of your Medicare coverage?  No    Healthy Habits:     In general, how would you rate your overall health?  Good    Frequency of exercise:  6-7 days/week    Duration of exercise:  15-30 minutes    Do you usually eat at least 4 servings of fruit and vegetables a day, include whole grains    & fiber and avoid regularly eating high fat or \"junk\" foods?  Yes    Taking medications regularly:  Yes    Medication side effects:  Not applicable    Ability to successfully perform activities of daily living:  No assistance needed    Home Safety:  Lack of grab bars in the bathroom    Hearing Impairment:  No hearing concerns    In the past 6 months, have you been bothered by leaking of urine?  No    In general, how would you rate your overall mental or emotional health?  Good      PHQ-2 Total Score: 0    Additional concerns today:  Yes    Do you feel safe in your environment? Yes    Have you ever done Advance Care Planning? (For example, a Health Directive, POLST, or a discussion with a medical provider or your loved ones about your wishes)      Fall risk  Fallen 2 or more times in the past year?: No  Any fall with injury in the past year?: No    Cognitive Screening   1) Repeat 3 items (Leader, Season, Table)    2) Clock draw: NORMAL  3) 3 item recall: Recalls 1 object   Results: NORMAL clock, 1-2 items recalled: COGNITIVE IMPAIRMENT LESS LIKELY    Mini-CogTM Copyright DOMINGA Braag. Licensed by the author for use in [facility]; reprinted with permission (flor@.[email or web address]). All rights reserved.      Do you have sleep apnea, excessive snoring or daytime drowsiness?: no    Reviewed and updated as needed this visit by clinical staff   Tobacco  Allergies  Meds   Med Hx  Surg Hx  Fam Hx  Soc Hx        Reviewed and " updated as needed this visit by Provider                 Social History     Tobacco Use     Smoking status: Never Smoker     Smokeless tobacco: Never Used   Substance Use Topics     Alcohol use: Yes     Comment: occasional social     If you drink alcohol do you typically have >3 drinks per day or >7 drinks per week? No    Alcohol Use 3/18/2022   Prescreen: >3 drinks/day or >7 drinks/week? No   Prescreen: >3 drinks/day or >7 drinks/week? -   No flowsheet data found.        Diabetes Follow-up    How often are you checking your blood sugar? One time daily  What time of day are you checking your blood sugars (select all that apply)?  Before and after meals  Have you had any blood sugars above 200?  No  Have you had any blood sugars below 70?  No    What symptoms do you notice when your blood sugar is low?  None    What concerns do you have today about your diabetes? None     Do you have any of these symptoms? (Select all that apply)  No numbness or tingling in feet.  No redness, sores or blisters on feet.  No complaints of excessive thirst.  No reports of blurry vision.  No significant changes to weight.      BP Readings from Last 2 Encounters:   03/18/22 134/80   02/01/22 137/82     Hemoglobin A1C POCT (%)   Date Value   05/10/2021 7.0 (H)   12/30/2020 10.2 (H)     Hemoglobin A1C (%)   Date Value   09/22/2021 6.3 (H)     LDL Cholesterol Calculated (mg/dL)   Date Value   09/16/2020 35   04/24/2018 50           Current providers sharing in care for this patient include:   Patient Care Team:  Damion Morales MD as PCP - General  Damion Morales MD as Assigned PCP  Tejinder Jarvis MD as Assigned Surgical Provider    The following health maintenance items are reviewed in Epic and correct as of today:  Health Maintenance Due   Topic Date Due     ANNUAL REVIEW OF HM ORDERS  Never done     HEPATITIS C SCREENING  Never done     ZOSTER IMMUNIZATION (1 of 2) Never done     DIABETIC FOOT EXAM  12/16/2014     ADVANCE CARE  "PLANNING  02/28/2019     MEDICARE ANNUAL WELLNESS VISIT  12/20/2019     LIPID  09/16/2021     FALL RISK ASSESSMENT  09/16/2021     A1C  03/22/2022     DM2 with nephropathy: meds.  Controlled.  Recheck.    Htn: stable.    Hyperlipidemia: stable  BpH: flomax, stabl      Review of Systems   Constitutional: Negative for chills and fever.   HENT: Negative for congestion, ear pain, hearing loss and sore throat.    Eyes: Negative for pain and visual disturbance.   Respiratory: Negative for cough and shortness of breath.    Cardiovascular: Negative for chest pain, palpitations and peripheral edema.   Gastrointestinal: Negative for abdominal pain, constipation, diarrhea, heartburn, hematochezia and nausea.   Genitourinary: Positive for frequency. Negative for dysuria, genital sores, hematuria, impotence, penile discharge and urgency.   Musculoskeletal: Negative for arthralgias, joint swelling and myalgias.   Skin: Negative for rash.   Neurological: Negative for dizziness, weakness, headaches and paresthesias.   Psychiatric/Behavioral: Negative for mood changes. The patient is not nervous/anxious.          OBJECTIVE:   /80   Pulse 74   Temp 97.5  F (36.4  C) (Tympanic)   Resp 16   Ht 1.702 m (5' 7\")   Wt 85.7 kg (189 lb)   SpO2 100%   BMI 29.60 kg/m   Estimated body mass index is 29.6 kg/m  as calculated from the following:    Height as of this encounter: 1.702 m (5' 7\").    Weight as of this encounter: 85.7 kg (189 lb).  Physical Exam  Gen: alert and oriented, in no acute distress, affect within normal limits  Neck: supple with no masses or nodes  Throat: oropharynx clear, no exudate or tonsillar/palate asymmetry.    CV: RRR, no murmur  Lungs: clear bilaterally with good effort  Abd: nontender, no mass  Ext: no edema or lesions   Neuro: moving all extremities, gait normal, no focal deficts noted      ASSESSMENT / PLAN:   Wellness visit   DM2 with nephropathy: meds.  Controlled.  Recheck.    Htn: stable.  " "  Hyperlipidemia: stable  BpH: flomax, stable    Needed new rybelsus script.  Otherwise continue meds.  Update labs.  Back in 6 mo         COUNSELING:  Reviewed preventive health counseling, as reflected in patient instructions       Regular exercise       Healthy diet/nutrition    Estimated body mass index is 29.6 kg/m  as calculated from the following:    Height as of this encounter: 1.702 m (5' 7\").    Weight as of this encounter: 85.7 kg (189 lb).    Weight management plan: diet/exercise     He reports that he has never smoked. He has never used smokeless tobacco.      Appropriate preventive services were discussed with this patient, including applicable screening as appropriate for cardiovascular disease, diabetes, osteopenia/osteoporosis, and glaucoma.  As appropriate for age/gender, discussed screening for colorectal cancer, prostate cancer, breast cancer, and cervical cancer. Checklist reviewing preventive services available has been given to the patient.    Reviewed patients plan of care and provided an AVS. The Basic Care Plan (routine screening as documented in Health Maintenance) for Cecilio meets the Care Plan requirement. This Care Plan has been established and reviewed with the Patient.        Damion Morales MD  [facility]    Identified Health Risks:  "

## 2022-03-21 LAB — HCV AB SERPL QL IA: NONREACTIVE

## 2022-04-07 ENCOUNTER — IMMUNIZATION (OUTPATIENT)
Dept: FAMILY MEDICINE | Facility: CLINIC | Age: 82
End: 2022-04-07
Payer: COMMERCIAL

## 2022-04-07 DIAGNOSIS — Z23 HIGH PRIORITY FOR 2019-NCOV VACCINE: Primary | ICD-10-CM

## 2022-04-07 PROCEDURE — 0054A COVID-19,PF,PFIZER (12+ YRS): CPT

## 2022-04-07 PROCEDURE — 91305 COVID-19,PF,PFIZER (12+ YRS): CPT

## 2022-04-19 ENCOUNTER — VIRTUAL VISIT (OUTPATIENT)
Dept: EDUCATION SERVICES | Facility: CLINIC | Age: 82
End: 2022-04-19
Payer: COMMERCIAL

## 2022-04-19 DIAGNOSIS — E11.21 TYPE 2 DIABETES MELLITUS WITH DIABETIC NEPHROPATHY, WITHOUT LONG-TERM CURRENT USE OF INSULIN (H): Primary | ICD-10-CM

## 2022-04-19 PROCEDURE — G0108 DIAB MANAGE TRN  PER INDIV: HCPCS | Performed by: DIETITIAN, REGISTERED

## 2022-04-19 NOTE — PROGRESS NOTES
"Diabetes Self-Management Education & Support    Presents for: Individual review    SUBJECTIVE/OBJECTIVE:  Presents for: Individual review  Diabetes type: Type 2  Cultural Influences/Ethnic Background:  No      Diabetes Symptoms & Complications:          Patient Problem List and Family Medical History reviewed for relevant medical history, current medical status, and diabetes risk factors.    Vitals:  There were no vitals taken for this visit.  Estimated body mass index is 29.6 kg/m  as calculated from the following:    Height as of 3/18/22: 1.702 m (5' 7\").    Weight as of 3/18/22: 85.7 kg (189 lb).   Last 3 BP:   BP Readings from Last 3 Encounters:   03/18/22 134/80   02/01/22 137/82   10/11/21 128/72       History   Smoking Status     Never Smoker   Smokeless Tobacco     Never Used       Labs:  Lab Results   Component Value Date    A1C 6.2 03/18/2022    A1C 7.0 05/10/2021     Lab Results   Component Value Date     09/22/2021     11/07/2019     Lab Results   Component Value Date    LDL 49 03/18/2022    LDL 35 09/16/2020     HDL Cholesterol   Date Value Ref Range Status   09/16/2020 43 >39 mg/dL Final     Direct Measure HDL   Date Value Ref Range Status   03/18/2022 48 >=40 mg/dL Final   ]  GFR Estimate   Date Value Ref Range Status   09/22/2021 81 >60 mL/min/1.73m2 Final     Comment:     As of July 11, 2021, eGFR is calculated by the CKD-EPI creatinine equation, without race adjustment. eGFR can be influenced by muscle mass, exercise, and diet. The reported eGFR is an estimation only and is only applicable if the renal function is stable.   11/07/2019 82 >60 mL/min/[1.73_m2] Final     Comment:     Non  GFR Calc  Starting 12/18/2018, serum creatinine based estimated GFR (eGFR) will be   calculated using the Chronic Kidney Disease Epidemiology Collaboration   (CKD-EPI) equation.       GFR Estimate If Black   Date Value Ref Range Status   11/07/2019 >90 >60 mL/min/[1.73_m2] Final     " Comment:      GFR Calc  Starting 12/18/2018, serum creatinine based estimated GFR (eGFR) will be   calculated using the Chronic Kidney Disease Epidemiology Collaboration   (CKD-EPI) equation.       Lab Results   Component Value Date    CR 0.90 09/22/2021    CR 0.90 11/07/2019     No results found for: MICROALBUMIN    Healthy Eating:  Healthy Eating Assessed Today: Yes  Meal planning/habits: Smaller portions  Breakfast: cereal or oatmeal or you and eggs on sunday  Lunch: ham sandwich, fruit or hard boiled egg  Dinner: ham, jello, cauliflower last night.  He does not eat heavy meals, appetite is not there with the Rybelsus.  Snacks: grapes, oranges and blueberries on cereal or canned apricots, peaches  Other: walking dog 2-3 times a day    Being Active:  Being Active Assessed Today: Yes  Exercise:: Yes (walks dog three times daily)    Monitoring:   breakfast: 140-148  Not testing as much  Last a1c: 6.2%        Taking Medications:  Diabetes Medication(s)     Biguanides       metFORMIN (GLUCOPHAGE) 1000 MG tablet    Take 1 tablet (1,000 mg) by mouth 2 times daily (with meals)    Sulfonylureas       glipiZIDE (GLUCOTROL XL) 10 MG 24 hr tablet    Take 1 tablet (10 mg) by mouth daily    Incretin Mimetic Agents (GLP-1 Receptor Agonists)       Semaglutide (RYBELSUS) 7 MG TABS    Take 7 mg by mouth daily Take in am on empty stomach, no other meds/food for 30 minutes.               Problem Solving:   not assessed              Reducing Risks:   Lipids: look good, BP good, wt 189,     Healthy Coping:     Patient Activation Measure Survey Score:  REMINGTON Score (Last Two) 11/15/2010   REMINGTON Raw Score 52   Activation Score 100   REMINGTON Level 4       Diabetes knowledge and skills assessment:   Patient is knowledgeable in diabetes management concepts related to: Healthy Eating, Being Active, Monitoring and Taking Medication    Patient needs further education on the following diabetes management concepts: Healthy Eating,  Being Active, Monitoring, Taking Medication, Problem Solving, Reducing Risks and Healthy Coping    Based on learning assessment above, most appropriate setting for further diabetes education would be: Individual setting.      INTERVENTIONS:    Education provided today on:  AADE Self-Care Behaviors:  Healthy Eating: carbohydrate counting, consistency in amount, composition, and timing of food intake and portion control  Being Active: relationship to blood glucose and describe appropriate activity program  Monitoring: individual blood glucose targets and frequency of monitoring  Taking Medication: action of prescribed medication and when to take medications    Opportunities for ongoing education and support in diabetes-self management were discussed.    Pt verbalized understanding of concepts discussed and recommendations provided today.       Education Materials Provided:  No new materials provided today      ASSESSMENT:  -doing well, staying active  -meal plan is going well, appetite is doing good.  -no concerns.      Patient's most recent   Lab Results   Component Value Date    A1C 6.2 03/18/2022    A1C 7.0 05/10/2021    is meeting goal of <7.0    PLAN  See Patient Instructions for co-developed, patient-stated behavior change goals.  AVS printed and provided to patient today. See Follow-Up section for recommended follow-up.      Time Spent: 50 minutes  Encounter Type: Individual    Any diabetes medication dose changes were made via the CDE Protocol and Collaborative Practice Agreement with the patient's primary care provider. A copy of this encounter was shared with the provider.

## 2022-05-18 DIAGNOSIS — N13.8 BPH WITH OBSTRUCTION/LOWER URINARY TRACT SYMPTOMS: ICD-10-CM

## 2022-05-18 DIAGNOSIS — N40.1 BPH WITH OBSTRUCTION/LOWER URINARY TRACT SYMPTOMS: ICD-10-CM

## 2022-05-19 RX ORDER — TAMSULOSIN HYDROCHLORIDE 0.4 MG/1
CAPSULE ORAL
Qty: 90 CAPSULE | Refills: 1 | Status: SHIPPED | OUTPATIENT
Start: 2022-05-19 | End: 2022-09-28

## 2022-09-06 DIAGNOSIS — E11.21 TYPE 2 DIABETES MELLITUS WITH DIABETIC NEPHROPATHY, WITHOUT LONG-TERM CURRENT USE OF INSULIN (H): ICD-10-CM

## 2022-09-07 RX ORDER — GLIPIZIDE 10 MG/1
TABLET, FILM COATED, EXTENDED RELEASE ORAL
Qty: 90 TABLET | Refills: 0 | Status: SHIPPED | OUTPATIENT
Start: 2022-09-07 | End: 2022-09-28

## 2022-09-13 DIAGNOSIS — I10 HTN, GOAL BELOW 140/90: ICD-10-CM

## 2022-09-14 RX ORDER — HYDROCHLOROTHIAZIDE 25 MG/1
TABLET ORAL
Qty: 90 TABLET | Refills: 0 | Status: SHIPPED | OUTPATIENT
Start: 2022-09-14 | End: 2022-09-28

## 2022-09-14 RX ORDER — METOPROLOL SUCCINATE 50 MG/1
TABLET, EXTENDED RELEASE ORAL
Qty: 90 TABLET | Refills: 0 | Status: SHIPPED | OUTPATIENT
Start: 2022-09-14 | End: 2022-09-28

## 2022-09-14 NOTE — TELEPHONE ENCOUNTER
Diuretics (Including Combos) Protocol Passed 09/13/2022 09:35 AM   Protocol Details  Blood pressure under 140/90 in past 12 months    Recent (12 mo) or future (30 days) visit within the authorizing provider's specialty    Medication is active on med list    Patient is age 18 or older    Normal serum creatinine on file in past 12 months    Normal serum potassium on file in past 12 months    Normal serum sodium on file in past 12 months     Beta-Blockers Protocol Passed 09/13/2022 09:35 AM   Protocol Details  Blood pressure under 140/90 in past 12 months    Patient is age 6 or older    Recent (12 mo) or future (30 days) visit within the authorizing provider's specialty    Medication is active on med list

## 2022-09-26 DIAGNOSIS — E78.5 HYPERLIPIDEMIA LDL GOAL <100: ICD-10-CM

## 2022-09-26 RX ORDER — ATORVASTATIN CALCIUM 40 MG/1
TABLET, FILM COATED ORAL
Qty: 90 TABLET | Refills: 1 | Status: SHIPPED | OUTPATIENT
Start: 2022-09-26 | End: 2022-09-28

## 2022-09-28 ENCOUNTER — OFFICE VISIT (OUTPATIENT)
Dept: FAMILY MEDICINE | Facility: CLINIC | Age: 82
End: 2022-09-28
Payer: COMMERCIAL

## 2022-09-28 VITALS
WEIGHT: 190 LBS | HEIGHT: 67 IN | SYSTOLIC BLOOD PRESSURE: 132 MMHG | HEART RATE: 68 BPM | OXYGEN SATURATION: 99 % | RESPIRATION RATE: 16 BRPM | BODY MASS INDEX: 29.82 KG/M2 | TEMPERATURE: 97.8 F | DIASTOLIC BLOOD PRESSURE: 68 MMHG

## 2022-09-28 DIAGNOSIS — E78.5 HYPERLIPIDEMIA LDL GOAL <100: ICD-10-CM

## 2022-09-28 DIAGNOSIS — N40.1 BPH WITH OBSTRUCTION/LOWER URINARY TRACT SYMPTOMS: ICD-10-CM

## 2022-09-28 DIAGNOSIS — I10 HTN, GOAL BELOW 140/90: ICD-10-CM

## 2022-09-28 DIAGNOSIS — E11.21 TYPE 2 DIABETES MELLITUS WITH DIABETIC NEPHROPATHY, WITHOUT LONG-TERM CURRENT USE OF INSULIN (H): ICD-10-CM

## 2022-09-28 DIAGNOSIS — Z23 HIGH PRIORITY FOR 2019-NCOV VACCINE: ICD-10-CM

## 2022-09-28 DIAGNOSIS — N13.8 BPH WITH OBSTRUCTION/LOWER URINARY TRACT SYMPTOMS: ICD-10-CM

## 2022-09-28 DIAGNOSIS — Z00.00 MEDICARE ANNUAL WELLNESS VISIT, SUBSEQUENT: Primary | ICD-10-CM

## 2022-09-28 DIAGNOSIS — Z23 NEED FOR PROPHYLACTIC VACCINATION AND INOCULATION AGAINST INFLUENZA: ICD-10-CM

## 2022-09-28 LAB
ANION GAP SERPL CALCULATED.3IONS-SCNC: 8 MMOL/L (ref 7–15)
BUN SERPL-MCNC: 12 MG/DL (ref 8–23)
CALCIUM SERPL-MCNC: 9.4 MG/DL (ref 8.8–10.2)
CHLORIDE SERPL-SCNC: 99 MMOL/L (ref 98–107)
CREAT SERPL-MCNC: 0.99 MG/DL (ref 0.67–1.17)
CREAT UR-MCNC: 87.1 MG/DL
DEPRECATED HCO3 PLAS-SCNC: 30 MMOL/L (ref 22–29)
GFR SERPL CREATININE-BSD FRML MDRD: 77 ML/MIN/1.73M2
GLUCOSE SERPL-MCNC: 150 MG/DL (ref 70–99)
HBA1C MFR BLD: 6.5 % (ref 0–5.6)
MICROALBUMIN UR-MCNC: <12 MG/L
MICROALBUMIN/CREAT UR: NORMAL MG/G{CREAT}
POTASSIUM SERPL-SCNC: 4.3 MMOL/L (ref 3.4–5.3)
SODIUM SERPL-SCNC: 137 MMOL/L (ref 136–145)

## 2022-09-28 PROCEDURE — 90662 IIV NO PRSV INCREASED AG IM: CPT | Performed by: FAMILY MEDICINE

## 2022-09-28 PROCEDURE — 91312 COVID-19,PF,PFIZER BOOSTER BIVALENT: CPT | Performed by: FAMILY MEDICINE

## 2022-09-28 PROCEDURE — 82043 UR ALBUMIN QUANTITATIVE: CPT | Performed by: FAMILY MEDICINE

## 2022-09-28 PROCEDURE — 99214 OFFICE O/P EST MOD 30 MIN: CPT | Mod: 25 | Performed by: FAMILY MEDICINE

## 2022-09-28 PROCEDURE — 36415 COLL VENOUS BLD VENIPUNCTURE: CPT | Performed by: FAMILY MEDICINE

## 2022-09-28 PROCEDURE — 83036 HEMOGLOBIN GLYCOSYLATED A1C: CPT | Performed by: FAMILY MEDICINE

## 2022-09-28 PROCEDURE — 0124A COVID-19,PF,PFIZER BOOSTER BIVALENT: CPT | Performed by: FAMILY MEDICINE

## 2022-09-28 PROCEDURE — 80048 BASIC METABOLIC PNL TOTAL CA: CPT | Performed by: FAMILY MEDICINE

## 2022-09-28 PROCEDURE — G0008 ADMIN INFLUENZA VIRUS VAC: HCPCS | Mod: 59 | Performed by: FAMILY MEDICINE

## 2022-09-28 PROCEDURE — G0439 PPPS, SUBSEQ VISIT: HCPCS | Performed by: FAMILY MEDICINE

## 2022-09-28 RX ORDER — TAMSULOSIN HYDROCHLORIDE 0.4 MG/1
0.4 CAPSULE ORAL DAILY
Qty: 90 CAPSULE | Refills: 3 | Status: SHIPPED | OUTPATIENT
Start: 2022-09-28 | End: 2023-11-13

## 2022-09-28 RX ORDER — HYDROCHLOROTHIAZIDE 25 MG/1
25 TABLET ORAL DAILY
Qty: 90 TABLET | Refills: 3 | Status: SHIPPED | OUTPATIENT
Start: 2022-09-28 | End: 2023-11-21

## 2022-09-28 RX ORDER — ORAL SEMAGLUTIDE 7 MG/1
7 TABLET ORAL DAILY
Qty: 30 TABLET | Refills: 11 | Status: SHIPPED | OUTPATIENT
Start: 2022-09-28 | End: 2023-10-19

## 2022-09-28 RX ORDER — METFORMIN HYDROCHLORIDE 750 MG/1
750 TABLET, EXTENDED RELEASE ORAL 2 TIMES DAILY WITH MEALS
Qty: 180 TABLET | Refills: 3 | Status: SHIPPED | OUTPATIENT
Start: 2022-09-28 | End: 2023-12-27

## 2022-09-28 RX ORDER — LISINOPRIL 40 MG/1
40 TABLET ORAL DAILY
Qty: 90 TABLET | Refills: 3 | Status: SHIPPED | OUTPATIENT
Start: 2022-09-28 | End: 2023-11-28

## 2022-09-28 RX ORDER — LANCETS
EACH MISCELLANEOUS
Qty: 100 EACH | Refills: 5 | Status: SHIPPED | OUTPATIENT
Start: 2022-09-28

## 2022-09-28 RX ORDER — ATORVASTATIN CALCIUM 40 MG/1
40 TABLET, FILM COATED ORAL DAILY
Qty: 90 TABLET | Refills: 3 | Status: SHIPPED | OUTPATIENT
Start: 2022-09-28 | End: 2023-10-02

## 2022-09-28 RX ORDER — GLIPIZIDE 10 MG/1
10 TABLET, FILM COATED, EXTENDED RELEASE ORAL DAILY
Qty: 90 TABLET | Refills: 3 | Status: SHIPPED | OUTPATIENT
Start: 2022-09-28 | End: 2023-11-28

## 2022-09-28 RX ORDER — METOPROLOL SUCCINATE 50 MG/1
50 TABLET, EXTENDED RELEASE ORAL DAILY
Qty: 90 TABLET | Refills: 3 | Status: SHIPPED | OUTPATIENT
Start: 2022-09-28 | End: 2023-12-20

## 2022-09-28 ASSESSMENT — ENCOUNTER SYMPTOMS
PARESTHESIAS: 0
DIARRHEA: 0
EYE PAIN: 0
CHILLS: 0
DYSURIA: 0
JOINT SWELLING: 0
CONSTIPATION: 0
NAUSEA: 0
ARTHRALGIAS: 0
HEMATOCHEZIA: 0
HEMATURIA: 0
PALPITATIONS: 0
COUGH: 0
FEVER: 0
FREQUENCY: 0
WEAKNESS: 0
HEARTBURN: 0
MYALGIAS: 0
SORE THROAT: 0
NERVOUS/ANXIOUS: 0
ABDOMINAL PAIN: 0
DIZZINESS: 0
HEADACHES: 0
SHORTNESS OF BREATH: 0

## 2022-09-28 ASSESSMENT — PAIN SCALES - GENERAL: PAINLEVEL: NO PAIN (0)

## 2022-09-28 ASSESSMENT — ACTIVITIES OF DAILY LIVING (ADL): CURRENT_FUNCTION: NO ASSISTANCE NEEDED

## 2022-09-28 NOTE — PROGRESS NOTES
"SUBJECTIVE:   Cecilio Navarro is a 82 year old who presents for Preventive Visit.      Patient has been advised of split billing requirements and indicates understanding: Yes  Are you in the first 12 months of your Medicare coverage?  No    Healthy Habits:     In general, how would you rate your overall health?  Excellent    Frequency of exercise:  6-7 days/week    Duration of exercise:  15-30 minutes    Do you usually eat at least 4 servings of fruit and vegetables a day, include whole grains    & fiber and avoid regularly eating high fat or \"junk\" foods?  Yes    Taking medications regularly:  Yes    Medication side effects:  Not applicable    Ability to successfully perform activities of daily living:  No assistance needed    Home Safety:  No safety concerns identified    Hearing Impairment:  Difficulty following a conversation in a noisy restaurant or crowded room    In the past 6 months, have you been bothered by leaking of urine?  No    In general, how would you rate your overall mental or emotional health?  Excellent      PHQ-2 Total Score: 0    Additional concerns today:  Yes    Do you feel safe in your environment? Yes    Have you ever done Advance Care Planning? (For example, a Health Directive, POLST, or a discussion with a medical provider or your loved ones about your wishes)      Fall risk  Fallen 2 or more times in the past year?: No  Any fall with injury in the past year?: No    Cognitive Screening   1) Repeat 3 items (Leader, Season, Table)    2) Clock draw: NORMAL  3) 3 item recall: Recalls 1 object   Results: NORMAL clock, 1-2 items recalled: COGNITIVE IMPAIRMENT LESS LIKELY    Mini-CogTM Copyright DOMINGA Braga. Licensed by the author for use in [facility]; reprinted with permission ([email or web address]). All rights reserved.      Do you have sleep apnea, excessive snoring or daytime drowsiness?: no    Reviewed and updated as needed this visit by clinical staff                    Reviewed and " updated as needed this visit by Provider                   Social History     Tobacco Use     Smoking status: Never Smoker     Smokeless tobacco: Never Used   Substance Use Topics     Alcohol use: Yes     Comment: occasional social     If you drink alcohol do you typically have >3 drinks per day or >7 drinks per week? No    Alcohol Use 9/28/2022   Prescreen: >3 drinks/day or >7 drinks/week? No   Prescreen: >3 drinks/day or >7 drinks/week? -   No flowsheet data found.      Diabetes Follow-up  Only taking one metformin     How often are you checking your blood sugar? A few times a week  What time of day are you checking your blood sugars (select all that apply)?  Before meals  Have you had any blood sugars above 200?  No  Have you had any blood sugars below 70?  No    What symptoms do you notice when your blood sugar is low?  None    What concerns do you have today about your diabetes? None     Do you have any of these symptoms? (Select all that apply)  No numbness or tingling in feet.  No redness, sores or blisters on feet.  No complaints of excessive thirst.  No reports of blurry vision.  No significant changes to weight.    DM2 with nephropathy: stable.  Recheck.  Would like maybe long acting metformin due to looser stools?   BPH: flomax.  Stable.  Fills  Hyperlipdemia: statin, stable  Htn: stable on meds.  Labs and fills        BP Readings from Last 2 Encounters:   03/18/22 134/80   02/01/22 137/82     Hemoglobin A1C (%)   Date Value   03/18/2022 6.2 (H)   09/22/2021 6.3 (H)   05/10/2021 7.0 (H)   12/30/2020 10.2 (H)     LDL Cholesterol Calculated (mg/dL)   Date Value   03/18/2022 49   09/16/2020 35   04/24/2018 50           Current providers sharing in care for this patient include: Patient Care Team:  Damion Morales MD as PCP - General  Damion Morales MD as Assigned PCP  Tejinder Jarvis MD as Assigned Surgical Provider    The following health maintenance items are reviewed in Epic and correct as of  "today:  Health Maintenance   Topic Date Due     ANNUAL REVIEW OF HM ORDERS  Never done     ZOSTER IMMUNIZATION (1 of 2) Never done     DIABETIC FOOT EXAM  12/16/2014     COVID-19 Vaccine (5 - Booster for Pfizer series) 06/02/2022     INFLUENZA VACCINE (1) 09/01/2022     A1C  09/18/2022     BMP  09/22/2022     MICROALBUMIN  09/22/2022     EYE EXAM  01/06/2023     MEDICARE ANNUAL WELLNESS VISIT  03/18/2023     LIPID  03/18/2023     FALL RISK ASSESSMENT  09/28/2023     ADVANCE CARE PLANNING  03/18/2027     DTAP/TDAP/TD IMMUNIZATION (4 - Td or Tdap) 10/01/2031     PHQ-2 (once per calendar year)  Completed     Pneumococcal Vaccine: 65+ Years  Completed     IPV IMMUNIZATION  Aged Out     MENINGITIS IMMUNIZATION  Aged Out           Review of Systems   Constitutional: Negative for chills and fever.   HENT: Negative for congestion, ear pain and sore throat.    Eyes: Negative for pain and visual disturbance.   Respiratory: Negative for cough and shortness of breath.    Cardiovascular: Negative for chest pain, palpitations and peripheral edema.   Gastrointestinal: Negative for abdominal pain, constipation, diarrhea, heartburn, hematochezia and nausea.   Genitourinary: Positive for impotence and urgency. Negative for dysuria, frequency, genital sores, hematuria and penile discharge.   Musculoskeletal: Negative for arthralgias, joint swelling and myalgias.   Neurological: Negative for dizziness, weakness, headaches and paresthesias.   Psychiatric/Behavioral: Negative for mood changes. The patient is not nervous/anxious.      OBJECTIVE:   /68   Pulse 68   Temp 97.8  F (36.6  C) (Tympanic)   Resp 16   Ht 1.702 m (5' 7\")   Wt 86.2 kg (190 lb)   SpO2 99%   BMI 29.76 kg/m   Estimated body mass index is 29.6 kg/m  as calculated from the following:    Height as of 3/18/22: 1.702 m (5' 7\").    Weight as of 3/18/22: 85.7 kg (189 lb).  Physical Exam  Gen: alert and oriented, in no acute distress, affect within normal " "limits  Neck: supple with no masses or nodes  Throat: oropharynx clear, no exudate or tonsillar/palate asymmetry.    CV: RRR, no murmur  Lungs: clear bilaterally with good effort  Abd: nontender, no mass  Ext: no edema or lesions   Neuro: moving all extremities, gait normal, no focal deficts noted      ASSESSMENT / PLAN:   Wellness visit   DM2 with nephropathy: stable.   BPH: flomax.  Stable.  Fills  Hyperlipdemia: statin, stable  Htn: stable on meds.  Labs and fills    Move to long acting metformin.  Update labs.  Fills on meds.      Back in 6 months.  Doing well.          COUNSELING:  Reviewed preventive health counseling, as reflected in patient instructions       Regular exercise       Healthy diet/nutrition    Estimated body mass index is 29.6 kg/m  as calculated from the following:    Height as of 3/18/22: 1.702 m (5' 7\").    Weight as of 3/18/22: 85.7 kg (189 lb).    Weight management plan: diet/exercise     He reports that he has never smoked. He has never used smokeless tobacco.      Appropriate preventive services were discussed with this patient, including applicable screening as appropriate for cardiovascular disease, diabetes, osteopenia/osteoporosis, and glaucoma.  As appropriate for age/gender, discussed screening for colorectal cancer, prostate cancer, breast cancer, and cervical cancer. Checklist reviewing preventive services available has been given to the patient.    Reviewed patients plan of care and provided an AVS. The Basic Care Plan (routine screening as documented in Health Maintenance) for Cecilio meets the Care Plan requirement. This Care Plan has been established and reviewed with the Patient.        Damion Morales MD  [facility]    Identified Health Risks:  "

## 2022-10-09 ENCOUNTER — TELEPHONE (OUTPATIENT)
Dept: FAMILY MEDICINE | Facility: CLINIC | Age: 82
End: 2022-10-09

## 2022-10-09 DIAGNOSIS — E11.21 TYPE 2 DIABETES MELLITUS WITH DIABETIC NEPHROPATHY, WITHOUT LONG-TERM CURRENT USE OF INSULIN (H): ICD-10-CM

## 2022-10-09 NOTE — TELEPHONE ENCOUNTER
[facility] is needing up to date PPN and Doctor's notes that reflect a current encounter date as the information they have has . Please fax required information to [phone]. (Per pharmacy request)

## 2022-10-20 ENCOUNTER — ALLIED HEALTH/NURSE VISIT (OUTPATIENT)
Dept: FAMILY MEDICINE | Facility: CLINIC | Age: 82
End: 2022-10-20
Payer: COMMERCIAL

## 2022-10-20 DIAGNOSIS — H61.23 BILATERAL IMPACTED CERUMEN: Primary | ICD-10-CM

## 2022-10-20 PROCEDURE — 99207 PR NO CHARGE NURSE ONLY: CPT

## 2022-10-27 ENCOUNTER — VIRTUAL VISIT (OUTPATIENT)
Dept: EDUCATION SERVICES | Facility: CLINIC | Age: 82
End: 2022-10-27
Payer: COMMERCIAL

## 2022-10-27 DIAGNOSIS — E11.21 TYPE 2 DIABETES MELLITUS WITH DIABETIC NEPHROPATHY, WITHOUT LONG-TERM CURRENT USE OF INSULIN (H): Primary | ICD-10-CM

## 2022-10-27 PROCEDURE — G0108 DIAB MANAGE TRN  PER INDIV: HCPCS | Performed by: DIETITIAN, REGISTERED

## 2022-10-27 NOTE — PATIENT INSTRUCTIONS
Test the blood sugars a couple times a week, if they are running higher    then test more often.      2.    Continue to watch portions at meals.       3.    Keep walking the dog good for both of you!

## 2022-10-27 NOTE — PROGRESS NOTES
Diabetes Self-Management Education & Support    Presents for: Individual review    Type of Service: Telephone Visit    Originating Location (Patient Location): Home  Distant Location (Provider Location): [facility]  Mode of Communication:  Telephone      Assessment Type:   ASSESSMENT:  -doing well a1c is excellent  -walks daily  -tries to watch food choices, they are eating less at noontime    Patient's most recent   Lab Results   Component Value Date    A1C 6.5 09/28/2022    A1C 7.0 05/10/2021     is meeting goal of <7.0    Diabetes knowledge and skills assessment:   Patient is knowledgeable in diabetes management concepts related to: Healthy Eating, Being Active and Monitoring    Continue education with the following diabetes management concepts: Healthy Eating, Being Active, Monitoring, Taking Medication, Problem Solving, Reducing Risks and Healthy Coping    Based on learning assessment above, most appropriate setting for further diabetes education would be: Individual setting.      PLAN  1.  Test the blood sugars a couple times a week, if they are running higher    then test more often.      2.    Continue to watch portions at meals.       3.    Keep walking the dog good for both of you!              Follow-up: in three months    See Care Plan for co-developed, patient-state behavior change goals.  AVS provided for patient today.    Education Materials Provided:  No new materials provided today      SUBJECTIVE/OBJECTIVE:  Presents for: Individual review  Diabetes type: Type 2  Cultural Influences/Ethnic Background:  Not  or       Diabetes Symptoms & Complications:          Patient Problem List and Family Medical History reviewed for relevant medical history, current medical status, and diabetes risk factors.    Vitals:  There were no vitals taken for this visit.  Estimated body mass index is 29.76 kg/m  as calculated from the following:    Height as of 9/28/22: 1.702 m  "(5' 7\").    Weight as of 9/28/22: 86.2 kg (190 lb).   Last 3 BP:   BP Readings from Last 3 Encounters:   09/28/22 132/68   03/18/22 134/80   02/01/22 137/82       History   Smoking Status     Never   Smokeless Tobacco     Never       Labs:  Lab Results   Component Value Date    A1C 6.5 09/28/2022    A1C 7.0 05/10/2021     Lab Results   Component Value Date     09/28/2022     09/22/2021     11/07/2019     Lab Results   Component Value Date    LDL 49 03/18/2022    LDL 35 09/16/2020     HDL Cholesterol   Date Value Ref Range Status   09/16/2020 43 >39 mg/dL Final     Direct Measure HDL   Date Value Ref Range Status   03/18/2022 48 >=40 mg/dL Final   ]  GFR Estimate   Date Value Ref Range Status   09/28/2022 77 >60 mL/min/1.73m2 Final     Comment:     Effective December 21, 2021 eGFRcr in adults is calculated using the 2021 CKD-EPI creatinine equation which includes age and gender (Yamil et al., NEJ, DOI: 10.1056/HICOyh2093676)   11/07/2019 82 >60 mL/min/[1.73_m2] Final     Comment:     Non  GFR Calc  Starting 12/18/2018, serum creatinine based estimated GFR (eGFR) will be   calculated using the Chronic Kidney Disease Epidemiology Collaboration   (CKD-EPI) equation.       GFR Estimate If Black   Date Value Ref Range Status   11/07/2019 >90 >60 mL/min/[1.73_m2] Final     Comment:      GFR Calc  Starting 12/18/2018, serum creatinine based estimated GFR (eGFR) will be   calculated using the Chronic Kidney Disease Epidemiology Collaboration   (CKD-EPI) equation.       Lab Results   Component Value Date    CR 0.99 09/28/2022    CR 0.90 11/07/2019     No results found for: MICROALBUMIN    Healthy Eating:  Healthy Eating Assessed Today: Yes  Breakfast: cereal or oatmeal or egg on sunday with you and toast  Lunch: can of soup or sandwich: turkey on 1 slice bread.  has been splitting sandwich at noon  Dinner: beef roast in crock pot, with mashed potates and gravy  Snacks: " halloween candy, grapes, popcorn  Other: 154 in the am.   has not been testing all the time.  they bought halloween candy and have been eating some of them.  Getting ready for hunting weekends.    Being Active:  Exercise:: Yes (walking your dog twice a day)    Monitoring:   a1c 6.5%        Taking Medications:  Diabetes Medication(s)     Biguanides       metFORMIN (GLUCOPHAGE) 1000 MG tablet    Take 1 tablet (1,000 mg) by mouth 2 times daily (with meals)     metFORMIN (GLUCOPHAGE-XR) 750 MG 24 hr tablet    Take 1 tablet (750 mg) by mouth 2 times daily (with meals)    Sulfonylureas       glipiZIDE (GLUCOTROL XL) 10 MG 24 hr tablet    Take 1 tablet (10 mg) by mouth daily    Incretin Mimetic Agents (GLP-1 Receptor Agonists)       Semaglutide (RYBELSUS) 7 MG TABS    Take 7 mg by mouth daily Take in am on empty stomach, no other meds/food for 30 minutes.               Problem Solving:   not assessed              Reducing Risks:   microalbumin done, lipids done in march, a1c 6.5%  Healthy Coping:     Patient Activation Measure Survey Score:  REMINGTON Score (Last Two) 11/15/2010   REMINGTON Raw Score 52   Activation Score 100   REMINGTON Level 4         Care Plan and Education Provided:  Care Plan: Diabetes   Updates made by Bertha Norwood RD since 10/27/2022 12:00 AM      Problem: HbA1C Not In Goal       Goal: Establish Regular Follow-Ups with PCP       Task: Discuss with PCP the recommended timing for patient's next follow up visit(s)    Responsible User: Bertha Norwood RD      Task: Discuss schedule for PCP visits with patient    Responsible User: Bertha Norwood RD      Goal: Get HbA1C Level in Goal       Task: Educate patient on diabetes education self-management topics    Responsible User: Bertha Norwood RD      Task: Educate patient on benefits of regular glucose monitoring    Responsible User: Bertha Norwood RD      Task: Refer patient to appropriate extended care team member, as needed (Medication Therapy Management, Behavioral Health,  Physical Therapy, etc.)    Responsible User: Bertha Norwood RD      Task: Discuss diabetes treatment plan with patient    Responsible User: Bertha Norwood RD      Problem: Diabetes Self-Management Education Needed to Optimize Self-Care Behaviors       Goal: Understand diabetes pathophysiology and disease progression       Task: Provide education on diabetes pathophysiology and disease progression specfic to patient's diabetes type    Responsible User: Bertha Norwood RD      Goal: Healthy Eating - follow a healthy eating pattern for diabetes       Task: Provide education on portion control and consistency in amount, composition and timing of food intake    Responsible User: Bertha Norwood RD      Task: Provide education on managing carbohydrate intake (carbohydrate counting, plate planning method, etc.)    Responsible User: Bertha Norwood RD      Task: Provide education on weight management    Responsible User: Bertha Norwood RD      Task: Provide education on heart healthy eating    Responsible User: Bertha Norwood RD      Task: Provide education on eating out    Responsible User: Bertha Norwood RD      Task: Develop individualized healthy eating plan with patient    Responsible User: Bertha Norwood RD      Goal: Being Active - get regular physical activity, working up to at least 150 minutes per week       Task: Provide education on relationship of activity to glucose and precautions to take if at risk for low glucose    Responsible User: Bertha Norwood RD      Task: Discuss barriers to physical activity with patient    Responsible User: Bertha Norwood RD      Task: Develop physical activity plan with patient    Responsible User: Bertha Norwood RD      Task: Explore community resources including walking groups, assistance programs, and home videos    Responsible User: Bertha Norwood RD      Goal: Monitoring - monitor glucose and ketones as directed    Note:    Test blood sugars at least 2-3 times a week     Task: Provide education on blood  glucose monitoring (purpose, proper technique, frequency, glucose targets, interpreting results, when to use glucose control solution, sharps disposal)    Responsible User: Bertha Norwood RD      Task: Provide education on continuous glucose monitoring (sensor placement, use of guy or /reader, understanding glucose trends, alerts and alarms, differences between sensor glucose and blood glucose)    Responsible User: Bertha Norwood RD      Task: Provide education on ketone monitoring (when to monitor, frequency, etc.)    Responsible User: Bertha Norwood RD      Goal: Taking Medication - patient is consistently taking medications as directed       Task: Provide education on action of prescribed medication, including when to take and possible side effects    Responsible User: Bertha Norwood RD      Task: Provide education on insulin and injectable diabetes medications, including administration, storage, site selection and rotation for injection sites    Responsible User: Bertha Norwood RD      Task: Discuss barriers to medication adherence with patient and provide management technique ideas as appropriate    Responsible User: Bertha Norwood RD      Task: Provide education on frequency and refill details of medications    Responsible User: Bertha Norwood RD      Goal: Problem Solving - know how to prevent and manage short-term diabetes complications       Task: Provide education on high blood glucose - causes, signs/symptoms, prevention and treatment    Responsible User: Bertha Norwood RD      Task: Provide education on low blood glucose - causes, signs/symptoms, prevention, treatment, carrying a carbohydrate source at all times, and medical identification    Responsible User: Bertha Norwood RD      Task: Provide education on safe travel with diabetes    Responsible User: Bertha Norwood RD      Task: Provide education on how to care for diabetes on sick days    Responsible User: Bertha Norwood RD      Task: Provide education on when  to call a health care provider    Responsible User: Bertha Norwood RD      Goal: Reducing Risks - know how to prevent and treat long-term diabetes complications       Task: Provide education on major complications of diabetes, prevention, early diagnostic measures and treatment of complications    Responsible User: Bertha Norwood RD      Task: Provide education on recommended care for dental, eye and foot health    Responsible User: Bertha Norwood RD      Task: Provide education on Hemoglobin A1c - goals and relationship to blood glucose levels    Responsible User: Bertha Norwood RD      Task: Provide education on recommendations for heart health - lipid levels and goals, blood pressure and goals, and aspirin therapy, if indicated    Responsible User: Bertha Norwood RD      Task: Provide education on tobacco cessation    Responsible User: Bertha Norwood RD      Goal: Healthy Coping - use available resources to cope with the challenges of managing diabetes       Task: Discuss recognizing feelings about having diabetes    Responsible User: Bertha Norwood RD      Task: Provide education on the benefits of making appropriate lifestyle changes    Responsible User: Bertha Norwood RD      Task: Provide education on benefits of utilizing support systems    Responsible User: Bertha Norwood RD      Task: Discuss methods for coping with stress    Responsible User: Bertha Norwood RD      Task: Provide education on when to seek professional counseling    Responsible User: Bertha Norwood RD              Time Spent: 30 minutes  Encounter Type: Individual    Any diabetes medication dose changes were made via the CDE Protocol per the patient's primary care provider. A copy of this encounter was shared with the provider.

## 2022-11-01 ENCOUNTER — OFFICE VISIT (OUTPATIENT)
Dept: AUDIOLOGY | Facility: CLINIC | Age: 82
End: 2022-11-01

## 2022-11-01 DIAGNOSIS — H90.3 SENSORINEURAL HEARING LOSS, ASYMMETRICAL: Primary | ICD-10-CM

## 2022-11-01 PROCEDURE — 99207 PR NO CHARGE LOS: CPT | Performed by: AUDIOLOGIST

## 2022-11-01 PROCEDURE — V5299 HEARING SERVICE: HCPCS | Mod: RT | Performed by: AUDIOLOGIST

## 2022-11-01 NOTE — PROGRESS NOTES
[facility]        SUBJECTIVE:  Cecilio Navarro, 82 year old male comes in with his wife for a recheck of his 2019 Phonak Audeo M50-R hearing aids. He reports he is not hearing as well with his right hearing aid. He states his ears were recently cleaned.    OBJECTIVE:  Cleaned hearing aids and replaced the wax guard, retention tail and domes bilaterally.    Verified hearing aid functionality.      ASSESSMENT/PLAN:    Return to clinic for updated hearing evaluation and possible hearing aid programming and verification.     Tamika BRENNAN-RAMON, #[id number]

## 2022-11-09 NOTE — TELEPHONE ENCOUNTER
Forms completed and signed by Dr. Morales. Faxed back. Sent to scanning.    Ivelisse Calixto Patient

## 2023-02-01 ENCOUNTER — TELEPHONE (OUTPATIENT)
Dept: DERMATOLOGY | Facility: CLINIC | Age: 83
End: 2023-02-01

## 2023-02-01 ENCOUNTER — OFFICE VISIT (OUTPATIENT)
Dept: DERMATOLOGY | Facility: CLINIC | Age: 83
End: 2023-02-01
Payer: COMMERCIAL

## 2023-02-01 DIAGNOSIS — L82.1 SEBORRHEIC KERATOSIS: ICD-10-CM

## 2023-02-01 DIAGNOSIS — D18.01 ANGIOMA OF SKIN: ICD-10-CM

## 2023-02-01 DIAGNOSIS — Z85.828 HISTORY OF SKIN CANCER: ICD-10-CM

## 2023-02-01 DIAGNOSIS — D23.9 DERMAL NEVUS: ICD-10-CM

## 2023-02-01 DIAGNOSIS — L81.4 LENTIGO: Primary | ICD-10-CM

## 2023-02-01 DIAGNOSIS — C44.212 BASAL CELL CARCINOMA (BCC) OF RIGHT EAR: ICD-10-CM

## 2023-02-01 PROCEDURE — 69100 BIOPSY OF EXTERNAL EAR: CPT | Performed by: DERMATOLOGY

## 2023-02-01 PROCEDURE — 99213 OFFICE O/P EST LOW 20 MIN: CPT | Mod: 25 | Performed by: DERMATOLOGY

## 2023-02-01 PROCEDURE — 88331 PATH CONSLTJ SURG 1 BLK 1SPC: CPT | Performed by: DERMATOLOGY

## 2023-02-01 NOTE — PATIENT INSTRUCTIONS
Open Wound Care     for ______________        No strenuous activity for 48 hours    Take Tylenol as needed for discomfort.                                                .         Do not drink alcoholic beverages for 48 hours.    Keep the pressure bandage in place for 24 hours. If the bandage becomes blood tinged or loose, reinforce it with gauze and tape.        (Refer to the reverse side of this page for management of bleeding).    Remove bandage in 24 hours and begin wound care as follows:     Clean area with tap water using a Q tip or gauze pad, (shower / bathe normally)  Dry wound with Q tip or gauze pad  Apply Aquaphor, Vaseline, Polysporin or Bacitracin Ointment with a Q tip  Do NOT use Neosporin Ointment *  Cover the wound with a band-aid or nonstick gauze pad and paper tape.  Repeat wound care once a day until wound is completely healed.    It is an old wives tale that a wound heals better when it is exposed to air and allowed to dry out. The wound will heal faster with a better cosmetic result if it is kept moist with ointment and covered with a bandage.  Do not let the wound dry out.      Supplies Needed:                Qtips or gauze pads                Polysporin or Bacitracin Ointment                Bandaids or nonstick gauze pads and paper tape    Wound care kits and brown paper tape are available for purchase at   the pharmacy.    BLEEDING:    Use tightly rolled up gauze or cloth to apply direct pressure over the bandage for 20   minutes.  Reapply pressure for an additional 20 minutes if necessary  Call the office or go to the nearest emergency room if pressure fails to stop the bleeding.  Use additional gauze and tape to maintain pressure once the bleeding has stopped.  Begin wound care 24 hours after surgery as directed.                  WOUND HEALING    One week after surgery a pink / red halo will form around the outside of the wound.   This is new skin.  The center of the wound will appear  yellowish white and produce some drainage.  The pink halo will slowly migrate in toward the center of the wound until the wound is covered with new shiny pink skin.  There will be no more drainage when the wound is completely healed.  It will take six months to one year for the redness to fade.  The scar may be itchy, tight and sensitive to extreme temperatures for a year after the surgery.  Massaging the area several times a day for several minutes after the wound is completely healed will help the scar soften and normalize faster. Begin massage only after healing is complete.      In case of emergency call: Dr Jarvis: [phone]    [facility]: [phone]    [facility]:[phone]

## 2023-02-01 NOTE — LETTER
2/1/2023         RE: Cecilio Navarro  [address] [facility] [address]        Dear Colleague,    Thank you for referring your patient, Cecilio Navarro, to the [facility]. Please see a copy of my visit note below.    Cecilio Navarro is an extremely pleasant 82 year old year old male patient here today for hx of non-melanoma skin cancer.  He denies any new or changing skin lesions.  Patient has no other skin complaints today.  Remainder of the HPI, Meds, PMH, Allergies, FH, and SH was reviewed in chart.      Past Medical History:   Diagnosis Date     Actinic keratoses      Basal cell carcinoma      Type II or unspecified type diabetes mellitus without mention of complication, not stated as uncontrolled      Unspecified disorder of male genital organs      Unspecified essential hypertension        Past Surgical History:   Procedure Laterality Date     COLONOSCOPY  10/16/2013    Diverticulosis in sigmoid colon; repeat in 5 years; Surgeon: Garry Ambrose MD;  Location: [facility]     HC REMOVE TONSILS/ADENOIDS,<13 Y/O          Family History   Problem Relation Age of Onset     Hypertension Mother      Cerebrovascular Disease Mother         mentally affected     Cancer - colorectal Father         mets to liver  age74     Gastrointestinal Disease Brother         multilple colon polyps     Hypertension Brother      Gastrointestinal Disease Sister         reflux     Obesity Son      Allergies Daughter      Arthritis Brother      Melanoma No family hx of        Social History     Socioeconomic History     Marital status:      Spouse name: Not on file     Number of children: Not on file     Years of education: Not on file     Highest education level: Not on file   Occupational History     Not on file   Tobacco Use     Smoking status: Never     Smokeless tobacco: Never   Substance and Sexual Activity     Alcohol use: Yes     Comment: occasional social     Drug use: No     Sexual  activity: Yes     Partners: Female   Other Topics Concern      Service No     Blood Transfusions No     Caffeine Concern Yes     Comment: 1-2 day     Occupational Exposure No     Hobby Hazards Yes     Comment: [place]     Sleep Concern No     Stress Concern No     Weight Concern No     Special Diet Yes     Comment: Diabetic and low fat     Back Care No     Exercise Yes     Bike Helmet Not Asked     Seat Belt Yes     Self-Exams Yes     Comment: check blood sugars     Parent/sibling w/ CABG, MI or angioplasty before 65F 55M? Not Asked   Social History Narrative     Not on file     Social Determinants of Health     Financial Resource Strain: Not on file   Food Insecurity: Not on file   Transportation Needs: Not on file   Physical Activity: Not on file   Stress: Not on file   Social Connections: Not on file   Intimate Partner Violence: Not on file   Housing Stability: Not on file       Outpatient Encounter Medications as of 2/1/2023   Medication Sig Dispense Refill     aspirin 81 MG tablet Take 81 mg by mouth daily       atorvastatin (LIPITOR) 40 MG tablet Take 1 tablet (40 mg) by mouth daily 90 tablet 3     blood glucose (NO BRAND SPECIFIED) test strip Use to test blood sugar 1 times daily.  Accu-chek guide me strips. 100 strip 3     blood glucose monitoring (SOFTCLIX) lancets Use to test blood sugar 1 times daily 100 each 5     ciclopirox (LOPROX) 0.77 % cream Daily at bedtime to face (Patient not taking: No sig reported) 90 g 3     desonide (DESOWEN) 0.05 % external cream Every morning to face (Patient not taking: No sig reported) 60 g 3     glipiZIDE (GLUCOTROL XL) 10 MG 24 hr tablet Take 1 tablet (10 mg) by mouth daily 90 tablet 3     hydrochlorothiazide (HYDRODIURIL) 25 MG tablet Take 1 tablet (25 mg) by mouth daily 90 tablet 3     ibuprofen (ADVIL/MOTRIN) 200 MG capsule Take 400 mg by mouth 2 times daily as needed.       lisinopril (ZESTRIL) 40 MG tablet Take 1 tablet (40 mg) by mouth daily 90 tablet 3      metFORMIN (GLUCOPHAGE) 1000 MG tablet Take 1 tablet (1,000 mg) by mouth 2 times daily (with meals) 180 tablet 3     metFORMIN (GLUCOPHAGE-XR) 750 MG 24 hr tablet Take 1 tablet (750 mg) by mouth 2 times daily (with meals) 180 tablet 3     metoprolol succinate ER (TOPROL XL) 50 MG 24 hr tablet Take 1 tablet (50 mg) by mouth daily 90 tablet 3     Semaglutide (RYBELSUS) 7 MG TABS Take 7 mg by mouth daily Take in am on empty stomach, no other meds/food for 30 minutes. 30 tablet 11     sildenafil (VIAGRA) 100 MG cap/tab Take 1 tablet (100 mg) by mouth daily as needed for erectile dysfunction 3 tablet 11     tamsulosin (FLOMAX) 0.4 MG capsule Take 1 capsule (0.4 mg) by mouth daily 90 capsule 3     vardenafil (LEVITRA) 20 MG tablet Take 1 tablet (20 mg) by mouth daily as needed for erectile dysfunction 5 tablet 11     No facility-administered encounter medications on file as of 2/1/2023.             O:   NAD, WDWN, Alert & Oriented, Mood & Affect wnl, Vitals stable   Here today alone   General appearance normal   Vitals stable   Alert, oriented and in no acute distress      Following lymph nodes palpated: Occipital, Cervical, Supraclavicular no lad  R post ear 5mm pink pearly papule      Stuck on papules and brown macules on trunk and ext   Red papules on trunk  Flesh colored papules on trunk     The remainder of the full exam was normal; the following areas were examined:  conjunctiva/lids, oral mucosa, neck, peripheral vascular system, abdomen, lymph nodes, digits/nails, eccrine and apocrine glands, scalp/hair, face, neck, chest, abdomen, buttocks, back, RUE, LUE, RLE, LLE       Eyes: Conjunctivae/lids:Normal     ENT: Lips, buccal mucosa, tongue: normal    MSK:Normal    Cardiovascular: peripheral edema none    Pulm: Breathing Normal    Lymph Nodes: No Head and Neck Lymphadenopathy     Neuro/Psych: Orientation:Alert and Orientedx3 ; Mood/Affect:normal       MICRO:   R post ear:Orthokeratosis of epidermis with a  proliferation of nests of basaloid cells, with peripheral palisading and a haphazard arrangement in the center extending into the dermis, forming nodules.  The tumor cells have hyperchromatic nuclei. Poor cytoplasm and intercellular bridging.    A/P:  1. Seborrheic keratosis, lentigo, angioma, dermal nevus, hx of non-melanoma skin cancer   2. R post ear r/o basal cell carcinoma   TANGENTIAL BIOPSY IN HOUSE:  After consent, anesthesia with LEC and prep, tangential excision performed and dx above confirmed with frozen section histology.  No complications and routine wound care.  Patient is on asa  anticoagulants and risk of bleeding discussed with patient.       I have personally reviewed all specimens and/or slides and used them with my medical judgement to determine or confirm the final diagnosis.     Patient told result basal cell carcinoma schedule excision .    It was a pleasure speaking to Cecilio Navarro today.  Previous clinic notes and pertinent laboratory tests were reviewed prior to Cecilio Navarro's visit.  Nature and genetics of benign skin lesions dicussed with patient.  Signs and Symptoms of skin cancer discussed with patient.  Patient encouraged to perform monthly skin exams.  UV precautions reviewed with patient.  Risks of non-melanoma skin cancer discussed with patient   Return to clinic next appt        Again, thank you for allowing me to participate in the care of your patient.        Sincerely,        Tejinder Jarvis MD

## 2023-02-01 NOTE — TELEPHONE ENCOUNTER
Called patient and gave results. All questions were answered. Patient was scheduled for Mohs with Dr. Jarvis. Information was sent in the mail as well.   Aleisha Crocker LPN

## 2023-02-01 NOTE — TELEPHONE ENCOUNTER
----- Message from Tejinder Jarvis MD sent at 2/1/2023 11:40 AM CST -----  R posterior ear basal cell carcinoma schedule excision

## 2023-02-01 NOTE — PROGRESS NOTES
Cecilio Navarro is an extremely pleasant 82 year old year old male patient here today for hx of non-melanoma skin cancer.  He denies any new or changing skin lesions.  Patient has no other skin complaints today.  Remainder of the HPI, Meds, PMH, Allergies, FH, and SH was reviewed in chart.      Past Medical History:   Diagnosis Date     Actinic keratoses      Basal cell carcinoma      Type II or unspecified type diabetes mellitus without mention of complication, not stated as uncontrolled      Unspecified disorder of male genital organs      Unspecified essential hypertension        Past Surgical History:   Procedure Laterality Date     COLONOSCOPY  10/16/2013    Diverticulosis in sigmoid colon; repeat in 5 years; Surgeon: Garry Ambrose MD;  Location: [facility]     HC REMOVE TONSILS/ADENOIDS,<13 Y/O          Family History   Problem Relation Age of Onset     Hypertension Mother      Cerebrovascular Disease Mother         mentally affected     Cancer - colorectal Father         mets to liver  age76     Gastrointestinal Disease Brother         multilple colon polyps     Hypertension Brother      Gastrointestinal Disease Sister         reflux     Obesity Son      Allergies Daughter      Arthritis Brother      Melanoma No family hx of        Social History     Socioeconomic History     Marital status:      Spouse name: Not on file     Number of children: Not on file     Years of education: Not on file     Highest education level: Not on file   Occupational History     Not on file   Tobacco Use     Smoking status: Never     Smokeless tobacco: Never   Substance and Sexual Activity     Alcohol use: Yes     Comment: occasional social     Drug use: No     Sexual activity: Yes     Partners: Female   Other Topics Concern      Service No     Blood Transfusions No     Caffeine Concern Yes     Comment: 1-2 day     Occupational Exposure No     Hobby Hazards Yes     Comment: [employer]     Sleep Concern No     Stress  Concern No     Weight Concern No     Special Diet Yes     Comment: Diabetic and low fat     Back Care No     Exercise Yes     Bike Helmet Not Asked     Seat Belt Yes     Self-Exams Yes     Comment: check blood sugars     Parent/sibling w/ CABG, MI or angioplasty before 65F 55M? Not Asked   Social History Narrative     Not on file     Social Determinants of Health     Financial Resource Strain: Not on file   Food Insecurity: Not on file   Transportation Needs: Not on file   Physical Activity: Not on file   Stress: Not on file   Social Connections: Not on file   Intimate Partner Violence: Not on file   Housing Stability: Not on file       Outpatient Encounter Medications as of 2/1/2023   Medication Sig Dispense Refill     aspirin 81 MG tablet Take 81 mg by mouth daily       atorvastatin (LIPITOR) 40 MG tablet Take 1 tablet (40 mg) by mouth daily 90 tablet 3     blood glucose (NO BRAND SPECIFIED) test strip Use to test blood sugar 1 times daily.  Accu-chek guide me strips. 100 strip 3     blood glucose monitoring (SOFTCLIX) lancets Use to test blood sugar 1 times daily 100 each 5     ciclopirox (LOPROX) 0.77 % cream Daily at bedtime to face (Patient not taking: No sig reported) 90 g 3     desonide (DESOWEN) 0.05 % external cream Every morning to face (Patient not taking: No sig reported) 60 g 3     glipiZIDE (GLUCOTROL XL) 10 MG 24 hr tablet Take 1 tablet (10 mg) by mouth daily 90 tablet 3     hydrochlorothiazide (HYDRODIURIL) 25 MG tablet Take 1 tablet (25 mg) by mouth daily 90 tablet 3     ibuprofen (ADVIL/MOTRIN) 200 MG capsule Take 400 mg by mouth 2 times daily as needed.       lisinopril (ZESTRIL) 40 MG tablet Take 1 tablet (40 mg) by mouth daily 90 tablet 3     metFORMIN (GLUCOPHAGE) 1000 MG tablet Take 1 tablet (1,000 mg) by mouth 2 times daily (with meals) 180 tablet 3     metFORMIN (GLUCOPHAGE-XR) 750 MG 24 hr tablet Take 1 tablet (750 mg) by mouth 2 times daily (with meals) 180 tablet 3     metoprolol  succinate ER (TOPROL XL) 50 MG 24 hr tablet Take 1 tablet (50 mg) by mouth daily 90 tablet 3     Semaglutide (RYBELSUS) 7 MG TABS Take 7 mg by mouth daily Take in am on empty stomach, no other meds/food for 30 minutes. 30 tablet 11     sildenafil (VIAGRA) 100 MG cap/tab Take 1 tablet (100 mg) by mouth daily as needed for erectile dysfunction 3 tablet 11     tamsulosin (FLOMAX) 0.4 MG capsule Take 1 capsule (0.4 mg) by mouth daily 90 capsule 3     vardenafil (LEVITRA) 20 MG tablet Take 1 tablet (20 mg) by mouth daily as needed for erectile dysfunction 5 tablet 11     No facility-administered encounter medications on file as of 2/1/2023.             O:   NAD, WDWN, Alert & Oriented, Mood & Affect wnl, Vitals stable   Here today alone   General appearance normal   Vitals stable   Alert, oriented and in no acute distress      Following lymph nodes palpated: Occipital, Cervical, Supraclavicular no lad  R post ear 5mm pink pearly papule      Stuck on papules and brown macules on trunk and ext   Red papules on trunk  Flesh colored papules on trunk     The remainder of the full exam was normal; the following areas were examined:  conjunctiva/lids, oral mucosa, neck, peripheral vascular system, abdomen, lymph nodes, digits/nails, eccrine and apocrine glands, scalp/hair, face, neck, chest, abdomen, buttocks, back, RUE, LUE, RLE, LLE       Eyes: Conjunctivae/lids:Normal     ENT: Lips, buccal mucosa, tongue: normal    MSK:Normal    Cardiovascular: peripheral edema none    Pulm: Breathing Normal    Lymph Nodes: No Head and Neck Lymphadenopathy     Neuro/Psych: Orientation:Alert and Orientedx3 ; Mood/Affect:normal       MICRO:   R post ear:Orthokeratosis of epidermis with a proliferation of nests of basaloid cells, with peripheral palisading and a haphazard arrangement in the center extending into the dermis, forming nodules.  The tumor cells have hyperchromatic nuclei. Poor cytoplasm and intercellular bridging.    A/P:  1.  Seborrheic keratosis, lentigo, angioma, dermal nevus, hx of non-melanoma skin cancer   2. R post ear r/o basal cell carcinoma   TANGENTIAL BIOPSY IN HOUSE:  After consent, anesthesia with LEC and prep, tangential excision performed and dx above confirmed with frozen section histology.  No complications and routine wound care.  Patient is on asa  anticoagulants and risk of bleeding discussed with patient.       I have personally reviewed all specimens and/or slides and used them with my medical judgement to determine or confirm the final diagnosis.     Patient told result basal cell carcinoma schedule excision .    It was a pleasure speaking to Cecilio Navarro today.  Previous clinic notes and pertinent laboratory tests were reviewed prior to Cecilio Navarro's visit.  Nature and genetics of benign skin lesions dicussed with patient.  Signs and Symptoms of skin cancer discussed with patient.  Patient encouraged to perform monthly skin exams.  UV precautions reviewed with patient.  Risks of non-melanoma skin cancer discussed with patient   Return to clinic next appt

## 2023-03-01 ENCOUNTER — OFFICE VISIT (OUTPATIENT)
Dept: DERMATOLOGY | Facility: CLINIC | Age: 83
End: 2023-03-01
Payer: COMMERCIAL

## 2023-03-01 VITALS — OXYGEN SATURATION: 100 % | HEART RATE: 73 BPM | DIASTOLIC BLOOD PRESSURE: 95 MMHG | SYSTOLIC BLOOD PRESSURE: 151 MMHG

## 2023-03-01 DIAGNOSIS — C44.212 BASAL CELL CARCINOMA (BCC) OF RIGHT EAR: Primary | ICD-10-CM

## 2023-03-01 PROCEDURE — 12051 INTMD RPR FACE/MM 2.5 CM/<: CPT | Performed by: DERMATOLOGY

## 2023-03-01 PROCEDURE — 17311 MOHS 1 STAGE H/N/HF/G: CPT | Performed by: DERMATOLOGY

## 2023-03-01 ASSESSMENT — PAIN SCALES - GENERAL: PAINLEVEL: NO PAIN (0)

## 2023-03-01 NOTE — NURSING NOTE
Chief Complaint   Patient presents with     Derm Problem     Mohs- R post ear       Vitals:    03/01/23 0821   BP: (!) 151/95   BP Location: Right arm   Patient Position: Sitting   Cuff Size: Adult Large   Pulse: 73   SpO2: 100%     Wt Readings from Last 1 Encounters:   09/28/22 86.2 kg (190 lb)       Aleisha Crocker LPN .................3/1/2023

## 2023-03-01 NOTE — PROGRESS NOTES
Cecilio Navarro is an extremely pleasant 82 year old year old male patient here today for evaluation and managment of basal cell carcinoma on right post ear.  Patient has no other skin complaints today.  Remainder of the HPI, Meds, PMH, Allergies, FH, and SH was reviewed in chart.      Past Medical History:   Diagnosis Date     Actinic keratoses      Basal cell carcinoma      Type II or unspecified type diabetes mellitus without mention of complication, not stated as uncontrolled      Unspecified disorder of male genital organs      Unspecified essential hypertension        Past Surgical History:   Procedure Laterality Date     COLONOSCOPY  10/16/2013    Diverticulosis in sigmoid colon; repeat in 5 years; Surgeon: Garry Ambrose MD;  Location: [facility]     HC REMOVE TONSILS/ADENOIDS,<11 Y/O          Family History   Problem Relation Age of Onset     Hypertension Mother      Cerebrovascular Disease Mother         mentally affected     Cancer - colorectal Father         mets to liver  age74     Gastrointestinal Disease Brother         multilple colon polyps     Hypertension Brother      Gastrointestinal Disease Sister         reflux     Obesity Son      Allergies Daughter      Arthritis Brother      Melanoma No family hx of        Social History     Socioeconomic History     Marital status:      Spouse name: Not on file     Number of children: Not on file     Years of education: Not on file     Highest education level: Not on file   Occupational History     Not on file   Tobacco Use     Smoking status: Never     Smokeless tobacco: Never   Substance and Sexual Activity     Alcohol use: Yes     Comment: occasional social     Drug use: No     Sexual activity: Yes     Partners: Female   Other Topics Concern      Service No     Blood Transfusions No     Caffeine Concern Yes     Comment: 1-2 day     Occupational Exposure No     Hobby Hazards Yes     Comment: [employer]     Sleep Concern No     Stress Concern  No     Weight Concern No     Special Diet Yes     Comment: Diabetic and low fat     Back Care No     Exercise Yes     Bike Helmet Not Asked     Seat Belt Yes     Self-Exams Yes     Comment: check blood sugars     Parent/sibling w/ CABG, MI or angioplasty before 65F 55M? Not Asked   Social History Narrative     Not on file     Social Determinants of Health     Financial Resource Strain: Not on file   Food Insecurity: Not on file   Transportation Needs: Not on file   Physical Activity: Not on file   Stress: Not on file   Social Connections: Not on file   Intimate Partner Violence: Not on file   Housing Stability: Not on file       Outpatient Encounter Medications as of 3/1/2023   Medication Sig Dispense Refill     aspirin 81 MG tablet Take 81 mg by mouth daily       atorvastatin (LIPITOR) 40 MG tablet Take 1 tablet (40 mg) by mouth daily 90 tablet 3     blood glucose (NO BRAND SPECIFIED) test strip Use to test blood sugar 1 times daily.  Accu-chek guide me strips. 100 strip 3     blood glucose monitoring (SOFTCLIX) lancets Use to test blood sugar 1 times daily 100 each 5     ciclopirox (LOPROX) 0.77 % cream Daily at bedtime to face (Patient not taking: No sig reported) 90 g 3     desonide (DESOWEN) 0.05 % external cream Every morning to face (Patient not taking: No sig reported) 60 g 3     glipiZIDE (GLUCOTROL XL) 10 MG 24 hr tablet Take 1 tablet (10 mg) by mouth daily 90 tablet 3     hydrochlorothiazide (HYDRODIURIL) 25 MG tablet Take 1 tablet (25 mg) by mouth daily 90 tablet 3     ibuprofen (ADVIL/MOTRIN) 200 MG capsule Take 400 mg by mouth 2 times daily as needed.       lisinopril (ZESTRIL) 40 MG tablet Take 1 tablet (40 mg) by mouth daily 90 tablet 3     metFORMIN (GLUCOPHAGE) 1000 MG tablet Take 1 tablet (1,000 mg) by mouth 2 times daily (with meals) 180 tablet 3     metFORMIN (GLUCOPHAGE-XR) 750 MG 24 hr tablet Take 1 tablet (750 mg) by mouth 2 times daily (with meals) 180 tablet 3     metoprolol succinate ER  (TOPROL XL) 50 MG 24 hr tablet Take 1 tablet (50 mg) by mouth daily 90 tablet 3     Semaglutide (RYBELSUS) 7 MG TABS Take 7 mg by mouth daily Take in am on empty stomach, no other meds/food for 30 minutes. 30 tablet 11     sildenafil (VIAGRA) 100 MG cap/tab Take 1 tablet (100 mg) by mouth daily as needed for erectile dysfunction 3 tablet 11     tamsulosin (FLOMAX) 0.4 MG capsule Take 1 capsule (0.4 mg) by mouth daily 90 capsule 3     vardenafil (LEVITRA) 20 MG tablet Take 1 tablet (20 mg) by mouth daily as needed for erectile dysfunction 5 tablet 11     No facility-administered encounter medications on file as of 3/1/2023.             O:   NAD, WDWN, Alert & Oriented, Mood & Affect wnl, Vitals stable   Here today alone   BP (!) 151/95 (BP Location: Right arm, Patient Position: Sitting, Cuff Size: Adult Large)   Pulse 73   SpO2 100%    General appearance normal   Vitals stable   Alert, oriented and in no acute distress     R post ear 5mm pink pearly papule       Eyes: Conjunctivae/lids:Normal     ENT: Lips, buccal mucosa, tongue: normal    MSK:Normal    Cardiovascular: peripheral edema none    Pulm: Breathing Normal    Neuro/Psych: Orientation:Alert and Orientedx3 ; Mood/Affect:normal       A/P:  1. R post ear basal cell carcinoma   MOHS:   Location    The rationale for Mohs surgery was discussed with the patient and consent was obtained.  The risks and benefits as well as alternatives to therapy were discussed, in detail.  Specifically, the risks of infection, scarring, bleeding, prolonged wound healing, incomplete removal, allergy to anesthesia, nerve injury and recurrence were addressed.  Indication for Mohs was Location. Prior to the procedure, the treatment site was clearly identified and, if available, confirmed with previous photos and confirmed by the patient   All components of the Universal Protocol/PAUSE rule were completed.  The Mohs surgeon operated in two distinct and integrated capacities as the  surgeon and pathologist.      The area was prepped with Betasept.  A rim of normal appearing skin was marked circumferentially around the lesion.  The area was infiltrated with local anesthesia.  The tumor was first debulked to remove all clinically apparent tumor.  An incision following the standard Mohs approach was done and the specimen was oriented,mapped and placed in 1 block(s).  Each specimen was then chromacoded and processed in the Mohs laboratory using standard Mohs technique and submitted for frozen section histology.  Frozen section analysis showed no residual tumor but CLEAR MARGINS.      The tumor was excised using standard Mohs technique in 1 stages(s).  CLEAR MARGINS OBTAINED and Final defect size was 1.1 cm.     We discussed the options for wound management in full with the patient including risks/benefits/ possible outcomes.        Because of the relatively superficial nature of the wound and patient s preference, an intermediate repair was planned.  Guiding sutures were carefully placed across the wound to control the direction of wound closure, to prevent distortion from wound contraction, and to minimize wound size to facilitate wound care and healing.  No complications; EBL minimal.  Routine wound care discussed with patient.    It was a pleasure speaking to Cecilio Navarro today.  Previous clinic notes and pertinent laboratory tests were reviewed prior to Cecilio Navarro's visit.  Signs and Symptoms of skin cancer discussed with patient.  Patient encouraged to perform monthly skin exams.  UV precautions reviewed with patient.  Risks of non-melanoma skin cancer discussed with patient   Return to clinic 6 months

## 2023-03-01 NOTE — LETTER
3/1/2023         RE: Cecilio Navarro  [address] [facility] [id number]        Dear Colleague,    Thank you for referring your patient, Cecilio Navarro, to the [facility]. Please see a copy of my visit note below.    Surgical Office Location :   [facility]  [address] [place] [address]      Cecilio Navarro is an extremely pleasant 82 year old year old male patient here today for evaluation and managment of basal cell carcinoma on right post ear.  Patient has no other skin complaints today.  Remainder of the HPI, Meds, PMH, Allergies, FH, and SH was reviewed in chart.      Past Medical History:   Diagnosis Date     Actinic keratoses      Basal cell carcinoma      Type II or unspecified type diabetes mellitus without mention of complication, not stated as uncontrolled      Unspecified disorder of male genital organs      Unspecified essential hypertension        Past Surgical History:   Procedure Laterality Date     COLONOSCOPY  10/16/2013    Diverticulosis in sigmoid colon; repeat in 5 years; Surgeon: Garry Ambrose MD;  Location: [facility]     HC REMOVE TONSILS/ADENOIDS,<11 Y/O          Family History   Problem Relation Age of Onset     Hypertension Mother      Cerebrovascular Disease Mother         mentally affected     Cancer - colorectal Father         mets to liver  age76     Gastrointestinal Disease Brother         multilple colon polyps     Hypertension Brother      Gastrointestinal Disease Sister         reflux     Obesity Son      Allergies Daughter      Arthritis Brother      Melanoma No family hx of        Social History     Socioeconomic History     Marital status:      Spouse name: Not on file     Number of children: Not on file     Years of education: Not on file     Highest education level: Not on file   Occupational History     Not on file   Tobacco Use     Smoking status: Never     Smokeless tobacco: Never   Substance and Sexual  Activity     Alcohol use: Yes     Comment: occasional social     Drug use: No     Sexual activity: Yes     Partners: Female   Other Topics Concern      Service No     Blood Transfusions No     Caffeine Concern Yes     Comment: 1-2 day     Occupational Exposure No     Hobby Hazards Yes     Comment: [employer]     Sleep Concern No     Stress Concern No     Weight Concern No     Special Diet Yes     Comment: Diabetic and low fat     Back Care No     Exercise Yes     Bike Helmet Not Asked     Seat Belt Yes     Self-Exams Yes     Comment: check blood sugars     Parent/sibling w/ CABG, MI or angioplasty before 65F 55M? Not Asked   Social History Narrative     Not on file     Social Determinants of Health     Financial Resource Strain: Not on file   Food Insecurity: Not on file   Transportation Needs: Not on file   Physical Activity: Not on file   Stress: Not on file   Social Connections: Not on file   Intimate Partner Violence: Not on file   Housing Stability: Not on file       Outpatient Encounter Medications as of 3/1/2023   Medication Sig Dispense Refill     aspirin 81 MG tablet Take 81 mg by mouth daily       atorvastatin (LIPITOR) 40 MG tablet Take 1 tablet (40 mg) by mouth daily 90 tablet 3     blood glucose (NO BRAND SPECIFIED) test strip Use to test blood sugar 1 times daily.  Accu-chek guide me strips. 100 strip 3     blood glucose monitoring (SOFTCLIX) lancets Use to test blood sugar 1 times daily 100 each 5     ciclopirox (LOPROX) 0.77 % cream Daily at bedtime to face (Patient not taking: No sig reported) 90 g 3     desonide (DESOWEN) 0.05 % external cream Every morning to face (Patient not taking: No sig reported) 60 g 3     glipiZIDE (GLUCOTROL XL) 10 MG 24 hr tablet Take 1 tablet (10 mg) by mouth daily 90 tablet 3     hydrochlorothiazide (HYDRODIURIL) 25 MG tablet Take 1 tablet (25 mg) by mouth daily 90 tablet 3     ibuprofen (ADVIL/MOTRIN) 200 MG capsule Take 400 mg by mouth 2 times daily as needed.        lisinopril (ZESTRIL) 40 MG tablet Take 1 tablet (40 mg) by mouth daily 90 tablet 3     metFORMIN (GLUCOPHAGE) 1000 MG tablet Take 1 tablet (1,000 mg) by mouth 2 times daily (with meals) 180 tablet 3     metFORMIN (GLUCOPHAGE-XR) 750 MG 24 hr tablet Take 1 tablet (750 mg) by mouth 2 times daily (with meals) 180 tablet 3     metoprolol succinate ER (TOPROL XL) 50 MG 24 hr tablet Take 1 tablet (50 mg) by mouth daily 90 tablet 3     Semaglutide (RYBELSUS) 7 MG TABS Take 7 mg by mouth daily Take in am on empty stomach, no other meds/food for 30 minutes. 30 tablet 11     sildenafil (VIAGRA) 100 MG cap/tab Take 1 tablet (100 mg) by mouth daily as needed for erectile dysfunction 3 tablet 11     tamsulosin (FLOMAX) 0.4 MG capsule Take 1 capsule (0.4 mg) by mouth daily 90 capsule 3     vardenafil (LEVITRA) 20 MG tablet Take 1 tablet (20 mg) by mouth daily as needed for erectile dysfunction 5 tablet 11     No facility-administered encounter medications on file as of 3/1/2023.             O:   NAD, WDWN, Alert & Oriented, Mood & Affect wnl, Vitals stable   Here today alone   BP (!) 151/95 (BP Location: Right arm, Patient Position: Sitting, Cuff Size: Adult Large)   Pulse 73   SpO2 100%    General appearance normal   Vitals stable   Alert, oriented and in no acute distress     R post ear 5mm pink pearly papule       Eyes: Conjunctivae/lids:Normal     ENT: Lips, buccal mucosa, tongue: normal    MSK:Normal    Cardiovascular: peripheral edema none    Pulm: Breathing Normal    Neuro/Psych: Orientation:Alert and Orientedx3 ; Mood/Affect:normal       A/P:  1. R post ear basal cell carcinoma   MOHS:   Location    The rationale for Mohs surgery was discussed with the patient and consent was obtained.  The risks and benefits as well as alternatives to therapy were discussed, in detail.  Specifically, the risks of infection, scarring, bleeding, prolonged wound healing, incomplete removal, allergy to anesthesia, nerve injury and  recurrence were addressed.  Indication for Mohs was Location. Prior to the procedure, the treatment site was clearly identified and, if available, confirmed with previous photos and confirmed by the patient   All components of the Universal Protocol/PAUSE rule were completed.  The Mohs surgeon operated in two distinct and integrated capacities as the surgeon and pathologist.      The area was prepped with Betasept.  A rim of normal appearing skin was marked circumferentially around the lesion.  The area was infiltrated with local anesthesia.  The tumor was first debulked to remove all clinically apparent tumor.  An incision following the standard Mohs approach was done and the specimen was oriented,mapped and placed in 1 block(s).  Each specimen was then chromacoded and processed in the Mohs laboratory using standard Mohs technique and submitted for frozen section histology.  Frozen section analysis showed no residual tumor but CLEAR MARGINS.      The tumor was excised using standard Mohs technique in 1 stages(s).  CLEAR MARGINS OBTAINED and Final defect size was 1.1 cm.     We discussed the options for wound management in full with the patient including risks/benefits/ possible outcomes.        Because of the relatively superficial nature of the wound and patient s preference, an intermediate repair was planned.  Guiding sutures were carefully placed across the wound to control the direction of wound closure, to prevent distortion from wound contraction, and to minimize wound size to facilitate wound care and healing.  No complications; EBL minimal.  Routine wound care discussed with patient.    It was a pleasure speaking to Cecilio Navarro today.  Previous clinic notes and pertinent laboratory tests were reviewed prior to Cecilio Navarro's visit.  Signs and Symptoms of skin cancer discussed with patient.  Patient encouraged to perform monthly skin exams.  UV precautions reviewed with patient.  Risks of  non-melanoma skin cancer discussed with patient   Return to clinic 6 months        Again, thank you for allowing me to participate in the care of your patient.        Sincerely,        Tejinder Jarvis MD

## 2023-03-01 NOTE — PATIENT INSTRUCTIONS
Open Wound Care   Right ear  for ______________        No strenuous activity for 48 hours    Take Tylenol as needed for discomfort.                                                .         Do not drink alcoholic beverages for 48 hours.    Keep the pressure bandage in place for 24 hours. If the bandage becomes blood tinged or loose, reinforce it with gauze and tape.        (Refer to the reverse side of this page for management of bleeding).    Remove bandage in 24 hours and begin wound care as follows:     Clean area with tap water using a Q tip or gauze pad, (shower / bathe normally)  Dry wound with Q tip or gauze pad  Apply Aquaphor, Vaseline, Polysporin or Bacitracin Ointment with a Q tip  Do NOT use Neosporin Ointment *  Cover the wound with a band-aid or nonstick gauze pad and paper tape.  Repeat wound care once a day until wound is completely healed.    It is an old wives tale that a wound heals better when it is exposed to air and allowed to dry out. The wound will heal faster with a better cosmetic result if it is kept moist with ointment and covered with a bandage.  Do not let the wound dry out.      Supplies Needed:                Qtips or gauze pads                Polysporin or Bacitracin Ointment                Bandaids or nonstick gauze pads and paper tape    Wound care kits and brown paper tape are available for purchase at   the pharmacy.    BLEEDING:    Use tightly rolled up gauze or cloth to apply direct pressure over the bandage for 20   minutes.  Reapply pressure for an additional 20 minutes if necessary  Call the office or go to the nearest emergency room if pressure fails to stop the bleeding.  Use additional gauze and tape to maintain pressure once the bleeding has stopped.  Begin wound care 24 hours after surgery as directed.                  WOUND HEALING    One week after surgery a pink / red halo will form around the outside of the wound.   This is new skin.  The center of the wound will  appear yellowish white and produce some drainage.  The pink halo will slowly migrate in toward the center of the wound until the wound is covered with new shiny pink skin.  There will be no more drainage when the wound is completely healed.  It will take six months to one year for the redness to fade.  The scar may be itchy, tight and sensitive to extreme temperatures for a year after the surgery.  Massaging the area several times a day for several minutes after the wound is completely healed will help the scar soften and normalize faster. Begin massage only after healing is complete.      In case of emergency call: Dr Jarvis: [phone]    [facility]: [phone]    [facility]:[phone]

## 2023-04-09 ENCOUNTER — HEALTH MAINTENANCE LETTER (OUTPATIENT)
Age: 83
End: 2023-04-09

## 2023-04-27 ENCOUNTER — VIRTUAL VISIT (OUTPATIENT)
Dept: EDUCATION SERVICES | Facility: CLINIC | Age: 83
End: 2023-04-27
Payer: COMMERCIAL

## 2023-04-27 DIAGNOSIS — E11.21 TYPE 2 DIABETES MELLITUS WITH DIABETIC NEPHROPATHY, WITHOUT LONG-TERM CURRENT USE OF INSULIN (H): Primary | ICD-10-CM

## 2023-04-27 PROCEDURE — G0108 DIAB MANAGE TRN  PER INDIV: HCPCS | Mod: 93 | Performed by: DIETITIAN, REGISTERED

## 2023-04-27 NOTE — PROGRESS NOTES
Diabetes Self-Management Education & Support    Presents for: Individual review    Type of Service: Telephone Visit    Originating Location (Patient Location): Home  Distant Location (Provider Location): [facility]  Mode of Communication:  Telephone      How would patient like to obtain AVS?  Not needed they will make appt with PCP  Assessment Type:   ASSESSMENT:  -struggling with diarrhea some, down to 1- 750 mg tablet of metformin (he will try taking it at supper).  -doing great with exercise  -due for PCP appt and a1c, lipid. They will make appt.  -struggled some with jellybeans over easter    Patient's most recent   Lab Results   Component Value Date    A1C 6.5 09/28/2022    A1C 7.0 05/10/2021     is meeting goal of <7.0    Diabetes knowledge and skills assessment:   Patient is knowledgeable in diabetes management concepts related to: Healthy Eating, Being Active and Monitoring    Continue education with the following diabetes management concepts: Healthy Eating, Being Active, Monitoring, Taking Medication, Problem Solving, Reducing Risks and Healthy Coping    Based on learning assessment above, most appropriate setting for further diabetes education would be: Individual setting.      PLAN  1. Try taking your metformin at supper.    2.  Stay active, doing well with that.  Watch the candy.      Follow-up: in 6 months    See Care Plan for co-developed, patient-state behavior change goals.  AVS provided for patient today.    Education Materials Provided:  No new materials provided today      SUBJECTIVE/OBJECTIVE:  Presents for: Individual review  Diabetes type: Type 2  Cultural Influences/Ethnic Background:  Not  or       Diabetes Symptoms & Complications:          Patient Problem List and Family Medical History reviewed for relevant medical history, current medical status, and diabetes risk factors.    Vitals:  There were no vitals taken for this visit.  Estimated body mass  "index is 29.76 kg/m  as calculated from the following:    Height as of 9/28/22: 1.702 m (5' 7\").    Weight as of 9/28/22: 86.2 kg (190 lb).   Last 3 BP:   BP Readings from Last 3 Encounters:   03/01/23 (!) 151/95   09/28/22 132/68   03/18/22 134/80       History   Smoking Status     Never   Smokeless Tobacco     Never       Labs:  Lab Results   Component Value Date    A1C 6.5 09/28/2022    A1C 7.0 05/10/2021     Lab Results   Component Value Date     09/28/2022     09/22/2021     11/07/2019     Lab Results   Component Value Date    LDL 49 03/18/2022    LDL 35 09/16/2020     HDL Cholesterol   Date Value Ref Range Status   09/16/2020 43 >39 mg/dL Final     Direct Measure HDL   Date Value Ref Range Status   03/18/2022 48 >=40 mg/dL Final   ]  GFR Estimate   Date Value Ref Range Status   09/28/2022 77 >60 mL/min/1.73m2 Final     Comment:     Effective December 21, 2021 eGFRcr in adults is calculated using the 2021 CKD-EPI creatinine equation which includes age and gender (Yamil et al., NEJM, DOI: 10.1056/TQRLvu2133330)   11/07/2019 82 >60 mL/min/[1.73_m2] Final     Comment:     Non  GFR Calc  Starting 12/18/2018, serum creatinine based estimated GFR (eGFR) will be   calculated using the Chronic Kidney Disease Epidemiology Collaboration   (CKD-EPI) equation.       GFR Estimate If Black   Date Value Ref Range Status   11/07/2019 >90 >60 mL/min/[1.73_m2] Final     Comment:      GFR Calc  Starting 12/18/2018, serum creatinine based estimated GFR (eGFR) will be   calculated using the Chronic Kidney Disease Epidemiology Collaboration   (CKD-EPI) equation.       Lab Results   Component Value Date    CR 0.99 09/28/2022    CR 0.90 11/07/2019     No results found for: MICROALBUMIN    Healthy Eating:  Healthy Eating Assessed Today: Yes  Breakfast: cereal, blueberries or you and eggs  Lunch: light lunch: granola bar or broccoli cheese soup or burger  Dinner: chuck, " bagel  Snacks: grapes green or  Other: B/17: 159, 159, 156, 160, 156, 148          Has not been taking the metformin every day due to diarrhea.  He will try taking it at suppertime instead of in am see if it helps.    Being Active:  Being Active Assessed Today: Yes (walking the dog, out to [employer] walked out 3/4 mile out and back.)    Monitoring:   see above        Taking Medications:  Diabetes Medication(s)     Biguanides       metFORMIN (GLUCOPHAGE) 1000 MG tablet    Take 1 tablet (1,000 mg) by mouth 2 times daily (with meals)     metFORMIN (GLUCOPHAGE-XR) 750 MG 24 hr tablet    Take 1 tablet (750 mg) by mouth 2 times daily (with meals)    Sulfonylureas       glipiZIDE (GLUCOTROL XL) 10 MG 24 hr tablet    Take 1 tablet (10 mg) by mouth daily    Incretin Mimetic Agents       Semaglutide (RYBELSUS) 7 MG TABS    Take 7 mg by mouth daily Take in am on empty stomach, no other meds/food for 30 minutes.                 Healthy Coping:     Patient Activation Measure Survey Score:      11/15/2010    10:00 AM   REMINGTON Score (Last Two)   REMINGTON Raw Score 52   Activation Score 100   REMINGTON Level 4         Care Plan and Education Provided:  Care Plan: Diabetes   Updates made by Bertha Norwood RD since 2023 12:00 AM      Problem: Diabetes Self-Management Education Needed to Optimize Self-Care Behaviors       Goal: Being Active - get regular physical activity, working up to at least 150 minutes per week    This Visit's Progress: 100%   Note:    Continue to walk dog daily             Time Spent: 45 minutes  Encounter Type: Individual    Any diabetes medication dose changes were made via the CDE Protocol per the patient's primary care provider. A copy of this encounter was shared with the provider.

## 2023-05-22 ENCOUNTER — TELEPHONE (OUTPATIENT)
Dept: FAMILY MEDICINE | Facility: CLINIC | Age: 83
End: 2023-05-22

## 2023-05-22 ENCOUNTER — OFFICE VISIT (OUTPATIENT)
Dept: FAMILY MEDICINE | Facility: CLINIC | Age: 83
End: 2023-05-22
Payer: COMMERCIAL

## 2023-05-22 VITALS
TEMPERATURE: 97.8 F | DIASTOLIC BLOOD PRESSURE: 64 MMHG | SYSTOLIC BLOOD PRESSURE: 104 MMHG | RESPIRATION RATE: 16 BRPM | OXYGEN SATURATION: 99 % | HEART RATE: 74 BPM | WEIGHT: 190 LBS | HEIGHT: 67 IN | BODY MASS INDEX: 29.82 KG/M2

## 2023-05-22 DIAGNOSIS — E78.5 HYPERLIPIDEMIA LDL GOAL <100: ICD-10-CM

## 2023-05-22 DIAGNOSIS — E11.21 TYPE 2 DIABETES MELLITUS WITH DIABETIC NEPHROPATHY, WITHOUT LONG-TERM CURRENT USE OF INSULIN (H): ICD-10-CM

## 2023-05-22 DIAGNOSIS — I10 HTN, GOAL BELOW 140/90: ICD-10-CM

## 2023-05-22 DIAGNOSIS — N40.1 BPH WITH OBSTRUCTION/LOWER URINARY TRACT SYMPTOMS: Primary | ICD-10-CM

## 2023-05-22 DIAGNOSIS — N13.8 BPH WITH OBSTRUCTION/LOWER URINARY TRACT SYMPTOMS: Primary | ICD-10-CM

## 2023-05-22 LAB — HBA1C MFR BLD: 6.8 % (ref 0–5.6)

## 2023-05-22 PROCEDURE — 36415 COLL VENOUS BLD VENIPUNCTURE: CPT | Performed by: FAMILY MEDICINE

## 2023-05-22 PROCEDURE — 83036 HEMOGLOBIN GLYCOSYLATED A1C: CPT | Performed by: FAMILY MEDICINE

## 2023-05-22 PROCEDURE — 99214 OFFICE O/P EST MOD 30 MIN: CPT | Performed by: FAMILY MEDICINE

## 2023-05-22 ASSESSMENT — PAIN SCALES - GENERAL: PAINLEVEL: NO PAIN (0)

## 2023-05-22 NOTE — PROGRESS NOTES
Subjective   Cecilio Navarro is a 82 year old, presenting for the following health issues:  Diabetes        5/22/2023     3:44 PM   Additional Questions   Roomed by wil SAUCEDO   Accompanied by wife     History of Present Illness       Diabetes:   He presents for follow up of diabetes.  He is checking home blood glucose a few times a month. He checks blood glucose before meals.  Blood glucose is never over 200 and never under 70. When his blood glucose is low, the patient is asymptomatic for confusion, blurred vision, lethargy and reports not feeling dizzy, shaky, or weak.  He has no concerns regarding his diabetes at this time.  He is not experiencing numbness or burning in feet, excessive thirst, blurry vision, weight changes or redness, sores or blisters on feet. The patient has not had a diabetic eye exam in the last 12 months.         Hyperlipidemia:  He presents for follow up of hyperlipidemia.  He is taking medication to lower cholesterol. He is not having myalgia or other side effects to statin medications.    He eats 2-3 servings of fruits and vegetables daily.He consumes 0 sweetened beverage(s) daily.He exercises with enough effort to increase his heart rate 20 to 29 minutes per day.  He exercises with enough effort to increase his heart rate 7 days per week.   He is taking medications regularly.       DM2 with some nephropathy: recheck.  Mild protein in urine in past.  Not increasing.  Moved to just once/day on metformin due to GI upset, happier with that.    Htn: stable on meds  Hyperlipidemia: statin, stable  BPH :fomax helps, stable    Current Outpatient Medications   Medication     aspirin 81 MG tablet     atorvastatin (LIPITOR) 40 MG tablet     blood glucose (NO BRAND SPECIFIED) test strip     blood glucose monitoring (SOFTCLIX) lancets     glipiZIDE (GLUCOTROL XL) 10 MG 24 hr tablet     hydrochlorothiazide (HYDRODIURIL) 25 MG tablet     lisinopril (ZESTRIL) 40 MG tablet     metFORMIN  "(GLUCOPHAGE-XR) 750 MG 24 hr tablet     metoprolol succinate ER (TOPROL XL) 50 MG 24 hr tablet     Semaglutide (RYBELSUS) 7 MG TABS     sildenafil (VIAGRA) 100 MG cap/tab     tamsulosin (FLOMAX) 0.4 MG capsule     vardenafil (LEVITRA) 20 MG tablet     No current facility-administered medications for this visit.        O:/64   Pulse 74   Temp 97.8  F (36.6  C) (Tympanic)   Resp 16   Ht 1.702 m (5' 7\")   Wt 86.2 kg (190 lb)   SpO2 99%   BMI 29.76 kg/m    GEN: Alert and oriented, in no acute distress  CV: RRR, no murmur  EXT: no edema or lesions noted in lower extremities    A: DM2 with some nephropathy: recheck.  Htn: stable on meds  Hyperlipidemia: statin, stable  BPH :fomax helps, stable        P:  Labs ordered as appropriate for monitoring the listed medical conditions, see what A1C is doing  Continue meds as above for his chronic issues as discussed    Back this fall for fills on everything.  He understands.    Weight management plan: diet/exercise          "

## 2023-05-22 NOTE — TELEPHONE ENCOUNTER
Patient Quality Outreach    Patient is due for the following:   Diabetes -  Diabetic Follow-Up Visit  Hypertension -  Hypertension follow-up visit    Next Steps:   Schedule a office visit for Bp    Type of outreach:    Phone, spoke to patient/parent. has appointment scheduled       Questions for provider review:    None           Kimberly Pedroza MA

## 2023-10-01 DIAGNOSIS — E78.5 HYPERLIPIDEMIA LDL GOAL <100: ICD-10-CM

## 2023-10-02 RX ORDER — ATORVASTATIN CALCIUM 40 MG/1
40 TABLET, FILM COATED ORAL DAILY
Qty: 30 TABLET | Refills: 1 | Status: SHIPPED | OUTPATIENT
Start: 2023-10-02 | End: 2023-11-28

## 2023-10-18 DIAGNOSIS — E11.21 TYPE 2 DIABETES MELLITUS WITH DIABETIC NEPHROPATHY, WITHOUT LONG-TERM CURRENT USE OF INSULIN (H): ICD-10-CM

## 2023-10-18 NOTE — TELEPHONE ENCOUNTER
"Requested Prescriptions   Pending Prescriptions Disp Refills    RYBELSUS 7 MG tablet [Pharmacy Med Name: Rybelsus 7 MG Oral Tablet] 30 tablet 0     Sig: TAKE 1 TABLET BY MOUTH ONCE DAILY IN THE MORNING ON  EMPTY  STOMACH  NO  OTHER  MEDS/FOOD  FOR  30  MINS       GLP-1 Agonists Protocol Failed - 10/18/2023  8:21 AM        Failed - Normal serum creatinine on file in past 12 months     Recent Labs   Lab Test 09/28/22  0957   CR 0.99       Ok to refill medication if creatinine is low          Passed - HgbA1C in past 3 or 6 months     If HgbA1C is 8 or greater, it needs to be on file within the past 3 months.  If less than 8, must be on file within the past 6 months.     Recent Labs   Lab Test 05/22/23  1626   A1C 6.8*             Passed - Medication is active on med list        Passed - Patient is age 18 or older        Passed - Recent (6 mo) or future (30 days) visit within the authorizing provider's specialty     Patient had office visit in the last 6 months or has a visit in the next 30 days with authorizing provider.  See \"Patient Info\" tab in inbasket, or \"Choose Columns\" in Meds & Orders section of the refill encounter.                 " 18

## 2023-10-19 RX ORDER — ORAL SEMAGLUTIDE 7 MG/1
TABLET ORAL
Qty: 30 TABLET | Refills: 0 | Status: SHIPPED | OUTPATIENT
Start: 2023-10-19 | End: 2023-11-22

## 2023-10-25 ENCOUNTER — TELEPHONE (OUTPATIENT)
Dept: FAMILY MEDICINE | Facility: CLINIC | Age: 83
End: 2023-10-25
Payer: COMMERCIAL

## 2023-10-25 NOTE — TELEPHONE ENCOUNTER
On 10/25/2023 I spoke with Cecilio, he is in need of financial assistance for medication.    We reviewed the Pharmacy Assistance Fund for $500, the Prescription Assistance Program for  assistance programs, gross income, insurance and RX list.      assistance application will soon be completed for Rybelsus during the 2024 New Enrollment Period.    If approved, Cecilio will receive this medication at no cost through December 2024.     Genie Winchester  Prescription   Please send responses to [email or web address]

## 2023-11-13 DIAGNOSIS — N13.8 BPH WITH OBSTRUCTION/LOWER URINARY TRACT SYMPTOMS: ICD-10-CM

## 2023-11-13 DIAGNOSIS — N40.1 BPH WITH OBSTRUCTION/LOWER URINARY TRACT SYMPTOMS: ICD-10-CM

## 2023-11-13 RX ORDER — TAMSULOSIN HYDROCHLORIDE 0.4 MG/1
0.4 CAPSULE ORAL DAILY
Qty: 30 CAPSULE | Refills: 0 | Status: SHIPPED | OUTPATIENT
Start: 2023-11-13 | End: 2023-12-13

## 2023-11-19 ENCOUNTER — HEALTH MAINTENANCE LETTER (OUTPATIENT)
Age: 83
End: 2023-11-19

## 2023-11-20 ENCOUNTER — IMMUNIZATION (OUTPATIENT)
Dept: FAMILY MEDICINE | Facility: CLINIC | Age: 83
End: 2023-11-20
Payer: COMMERCIAL

## 2023-11-20 DIAGNOSIS — Z23 HIGH PRIORITY FOR 2019-NCOV VACCINE: ICD-10-CM

## 2023-11-20 DIAGNOSIS — Z23 NEED FOR PROPHYLACTIC VACCINATION AND INOCULATION AGAINST INFLUENZA: Primary | ICD-10-CM

## 2023-11-20 PROCEDURE — 90480 ADMN SARSCOV2 VAC 1/ONLY CMP: CPT

## 2023-11-20 PROCEDURE — G0008 ADMIN INFLUENZA VIRUS VAC: HCPCS

## 2023-11-20 PROCEDURE — 91320 SARSCV2 VAC 30MCG TRS-SUC IM: CPT

## 2023-11-20 PROCEDURE — 90662 IIV NO PRSV INCREASED AG IM: CPT

## 2023-11-21 DIAGNOSIS — I10 HTN, GOAL BELOW 140/90: ICD-10-CM

## 2023-11-21 DIAGNOSIS — E11.21 TYPE 2 DIABETES MELLITUS WITH DIABETIC NEPHROPATHY, WITHOUT LONG-TERM CURRENT USE OF INSULIN (H): ICD-10-CM

## 2023-11-21 RX ORDER — HYDROCHLOROTHIAZIDE 25 MG/1
25 TABLET ORAL DAILY
Qty: 90 TABLET | Refills: 0 | Status: SHIPPED | OUTPATIENT
Start: 2023-11-21 | End: 2023-12-27

## 2023-11-22 RX ORDER — ORAL SEMAGLUTIDE 7 MG/1
TABLET ORAL
Qty: 60 TABLET | Refills: 0 | Status: SHIPPED | OUTPATIENT
Start: 2023-11-22 | End: 2023-12-27

## 2023-11-22 NOTE — TELEPHONE ENCOUNTER
"Has appointment scheduled for 12/27/23      Requested Prescriptions   Pending Prescriptions Disp Refills    RYBELSUS 7 MG tablet [Pharmacy Med Name: Rybelsus 7 MG Oral Tablet] 30 tablet 0     Sig: TAKE 1 TABLET BY MOUTH IN THE MORNING ON AN EMPTY STOMACH. NO OTHER MEDICATION/FOOD FOR 30 MINUTES.       GLP-1 Agonists Protocol Failed - 11/21/2023  6:03 PM        Failed - HgbA1C in past 3 or 6 months     If HgbA1C is 8 or greater, it needs to be on file within the past 3 months.  If less than 8, must be on file within the past 6 months.     Recent Labs   Lab Test 05/22/23  1626   A1C 6.8*             Failed - Normal serum creatinine on file in past 12 months     Recent Labs   Lab Test 09/28/22  0957   CR 0.99       Ok to refill medication if creatinine is low          Failed - Recent (6 mo) or future (30 days) visit within the authorizing provider's specialty     Patient had office visit in the last 6 months or has a visit in the next 30 days with authorizing provider.  See \"Patient Info\" tab in inbasket, or \"Choose Columns\" in Meds & Orders section of the refill encounter.            Passed - Medication is active on med list        Passed - Patient is age 18 or older             "

## 2023-11-27 ENCOUNTER — VIRTUAL VISIT (OUTPATIENT)
Dept: EDUCATION SERVICES | Facility: CLINIC | Age: 83
End: 2023-11-27
Payer: COMMERCIAL

## 2023-11-27 DIAGNOSIS — E11.21 TYPE 2 DIABETES MELLITUS WITH DIABETIC NEPHROPATHY, WITHOUT LONG-TERM CURRENT USE OF INSULIN (H): Primary | ICD-10-CM

## 2023-11-27 PROCEDURE — G0108 DIAB MANAGE TRN  PER INDIV: HCPCS | Mod: 93 | Performed by: DIETITIAN, REGISTERED

## 2023-11-27 NOTE — PROGRESS NOTES
Diabetes Self-Management Education & Support    Presents for: Individual review    Type of Service: Telephone Visit    Originating Location (Patient Location): Home  Distant Location (Provider Location): [facility]  Mode of Communication:  Telephone    Telephone Visit Start Time: 11:03 am  Telephone Visit End Time (telephone visit stop time): 11:50 am    How would patient like to obtain AVS? [facility]      ASSESSMENT:  -having some higher numbers, he was not taking both metformin tablets he will work on taking that faithfully until guy with Dr Morales the end of December  -if A1c way up suggest going up on Rybelsus  -he has had some treats: fruit cake, halloween candy.  -still feels he is getting full feeling    Patient's most recent   Lab Results   Component Value Date    A1C 6.8 05/22/2023    A1C 7.0 05/10/2021     is meeting goal of <7.0    Diabetes knowledge and skills assessment:   Patient is knowledgeable in diabetes management concepts related to: Healthy Eating, Being Active, and Monitoring    Continue education with the following diabetes management concepts: Healthy Eating, Being Active, Monitoring, Taking Medication, Problem Solving, Reducing Risks, and Healthy Coping    Based on learning assessment above, most appropriate setting for further diabetes education would be: Individual setting.      PLAN  1 make sure you are taking your Metformin 750 mg twice daily  2.Make sure you are spreading out the fruit cake and doing small portions.      Follow-up: in 6 months    See Care Plan for co-developed, patient-state behavior change goals.  AVS provided for patient today.    Education Materials Provided:  No new materials provided today      SUBJECTIVE/OBJECTIVE:  Presents for: Individual review  Accompanied by: Self  Diabetes type: Type 2  Cultural Influences/Ethnic Background:  Not  or       Diabetes Symptoms & Complications:          Patient Problem List and Family Medical  "History reviewed for relevant medical history, current medical status, and diabetes risk factors.    Vitals:  There were no vitals taken for this visit.  Estimated body mass index is 29.76 kg/m  as calculated from the following:    Height as of 5/22/23: 1.702 m (5' 7\").    Weight as of 5/22/23: 86.2 kg (190 lb).   Last 3 BP:   BP Readings from Last 3 Encounters:   05/22/23 104/64   03/01/23 (!) 151/95   09/28/22 132/68       History   Smoking Status    Never   Smokeless Tobacco    Never       Labs:  Lab Results   Component Value Date    A1C 6.8 05/22/2023    A1C 7.0 05/10/2021     Lab Results   Component Value Date     09/28/2022     09/22/2021     11/07/2019     Lab Results   Component Value Date    LDL 49 03/18/2022    LDL 35 09/16/2020     HDL Cholesterol   Date Value Ref Range Status   09/16/2020 43 >39 mg/dL Final     Direct Measure HDL   Date Value Ref Range Status   03/18/2022 48 >=40 mg/dL Final   ]  GFR Estimate   Date Value Ref Range Status   09/28/2022 77 >60 mL/min/1.73m2 Final     Comment:     Effective December 21, 2021 eGFRcr in adults is calculated using the 2021 CKD-EPI creatinine equation which includes age and gender (Yamil et al., NEJ, DOI: 10.1056/YRAAto0388650)   11/07/2019 82 >60 mL/min/[1.73_m2] Final     Comment:     Non  GFR Calc  Starting 12/18/2018, serum creatinine based estimated GFR (eGFR) will be   calculated using the Chronic Kidney Disease Epidemiology Collaboration   (CKD-EPI) equation.       GFR Estimate If Black   Date Value Ref Range Status   11/07/2019 >90 >60 mL/min/[1.73_m2] Final     Comment:      GFR Calc  Starting 12/18/2018, serum creatinine based estimated GFR (eGFR) will be   calculated using the Chronic Kidney Disease Epidemiology Collaboration   (CKD-EPI) equation.       Lab Results   Component Value Date    CR 0.99 09/28/2022    CR 0.90 11/07/2019     No results found for: \"MICROALBUMIN\"    Healthy " Eating:  Healthy Eating Assessed Today: Yes  Breakfast: cereal, coffee or toast, butter  Lunch: ham, turkey, leftovers  Dinner: ham, mashed potatoe and gravy, corn  Snacks: fruit cake, halloween candy  Other: B/8 165, 10/21: 155, : 152, 152 : 160 : 148 this am 180.  He was only doing the 750 mg daily, will go back to doing twice a day    Being Active:  Being Active Assessed Today: Yes (walks dog)    Monitoring:   See above        Taking Medications:  Diabetes Medication(s)       Biguanides       metFORMIN (GLUCOPHAGE-XR) 750 MG 24 hr tablet    Take 1 tablet (750 mg) by mouth 2 times daily (with meals)      Sulfonylureas       glipiZIDE (GLUCOTROL XL) 10 MG 24 hr tablet    Take 1 tablet (10 mg) by mouth daily      Incretin Mimetic Agents       Semaglutide (RYBELSUS) 7 MG tablet    TAKE 1 TABLET BY MOUTH IN THE MORNING ON AN EMPTY STOMACH. NO OTHER MEDICATION/FOOD FOR 30 MINUTES.                    Healthy Coping:     Patient Activation Measure Survey Score:      11/15/2010    10:00 AM   REMINGTON Score (Last Two)   REMINGTNO Raw Score 52   Activation Score 100   REMINGTON Level 4         Care Plan and Education Provided:  There are no care plans that you recently modified to display for this patient.          Time Spent: 60 minutes  Encounter Type: Individual    Any diabetes medication dose changes were made via the CDE Protocol per the patient's primary care provider. A copy of this encounter was shared with the provider.

## 2023-11-28 DIAGNOSIS — E11.21 TYPE 2 DIABETES MELLITUS WITH DIABETIC NEPHROPATHY, WITHOUT LONG-TERM CURRENT USE OF INSULIN (H): ICD-10-CM

## 2023-11-28 DIAGNOSIS — E78.5 HYPERLIPIDEMIA LDL GOAL <100: ICD-10-CM

## 2023-11-28 DIAGNOSIS — I10 HTN, GOAL BELOW 140/90: ICD-10-CM

## 2023-11-28 RX ORDER — GLIPIZIDE 10 MG/1
10 TABLET, FILM COATED, EXTENDED RELEASE ORAL DAILY
Qty: 90 TABLET | Refills: 0 | Status: SHIPPED | OUTPATIENT
Start: 2023-11-28 | End: 2023-12-27

## 2023-11-28 RX ORDER — ATORVASTATIN CALCIUM 40 MG/1
40 TABLET, FILM COATED ORAL DAILY
Qty: 30 TABLET | Refills: 0 | Status: SHIPPED | OUTPATIENT
Start: 2023-11-28 | End: 2023-12-27

## 2023-11-28 RX ORDER — LISINOPRIL 40 MG/1
40 TABLET ORAL DAILY
Qty: 90 TABLET | Refills: 0 | Status: SHIPPED | OUTPATIENT
Start: 2023-11-28 | End: 2023-12-27

## 2023-12-12 DIAGNOSIS — N13.8 BPH WITH OBSTRUCTION/LOWER URINARY TRACT SYMPTOMS: ICD-10-CM

## 2023-12-12 DIAGNOSIS — N40.1 BPH WITH OBSTRUCTION/LOWER URINARY TRACT SYMPTOMS: ICD-10-CM

## 2023-12-12 NOTE — TELEPHONE ENCOUNTER
"Requested Prescriptions   Pending Prescriptions Disp Refills    tamsulosin (FLOMAX) 0.4 MG capsule [Pharmacy Med Name: Tamsulosin HCl 0.4 MG Oral Capsule] 30 capsule 0     Sig: Take 1 capsule by mouth once daily       Alpha Blockers Failed - 12/12/2023 10:53 AM        Failed - Patient does not have Tadalafil, Vardenafil, or Sildenafil on their medication list        Passed - Blood pressure under 140/90 in past 12 months     BP Readings from Last 3 Encounters:   05/22/23 104/64   03/01/23 (!) 151/95   09/28/22 132/68                 Passed - Recent (12 mo) or future (30 days) visit within the authorizing provider's specialty     Patient has had an office visit with the authorizing provider or a provider within the authorizing providers department within the previous 12 mos or has a future within next 30 days. See \"Patient Info\" tab in inbasket, or \"Choose Columns\" in Meds & Orders section of the refill encounter.              Passed - Medication is active on med list        Passed - Patient is 18 years of age or older             "

## 2023-12-13 RX ORDER — TAMSULOSIN HYDROCHLORIDE 0.4 MG/1
0.4 CAPSULE ORAL DAILY
Qty: 30 CAPSULE | Refills: 1 | Status: SHIPPED | OUTPATIENT
Start: 2023-12-13 | End: 2023-12-27

## 2023-12-20 DIAGNOSIS — I10 HTN, GOAL BELOW 140/90: ICD-10-CM

## 2023-12-20 RX ORDER — METOPROLOL SUCCINATE 50 MG/1
50 TABLET, EXTENDED RELEASE ORAL DAILY
Qty: 90 TABLET | Refills: 0 | Status: SHIPPED | OUTPATIENT
Start: 2023-12-20 | End: 2023-12-27

## 2023-12-27 ENCOUNTER — OFFICE VISIT (OUTPATIENT)
Dept: FAMILY MEDICINE | Facility: CLINIC | Age: 83
End: 2023-12-27
Payer: COMMERCIAL

## 2023-12-27 VITALS
SYSTOLIC BLOOD PRESSURE: 126 MMHG | DIASTOLIC BLOOD PRESSURE: 82 MMHG | OXYGEN SATURATION: 100 % | HEART RATE: 71 BPM | RESPIRATION RATE: 16 BRPM | HEIGHT: 67 IN | BODY MASS INDEX: 29.51 KG/M2 | WEIGHT: 188 LBS

## 2023-12-27 DIAGNOSIS — N13.8 BPH WITH OBSTRUCTION/LOWER URINARY TRACT SYMPTOMS: ICD-10-CM

## 2023-12-27 DIAGNOSIS — Z00.00 MEDICARE ANNUAL WELLNESS VISIT, SUBSEQUENT: Primary | ICD-10-CM

## 2023-12-27 DIAGNOSIS — N40.1 BPH WITH OBSTRUCTION/LOWER URINARY TRACT SYMPTOMS: ICD-10-CM

## 2023-12-27 DIAGNOSIS — I10 HTN, GOAL BELOW 140/90: ICD-10-CM

## 2023-12-27 DIAGNOSIS — E78.5 HYPERLIPIDEMIA LDL GOAL <100: ICD-10-CM

## 2023-12-27 DIAGNOSIS — Z00.00 ENCOUNTER FOR MEDICARE ANNUAL WELLNESS EXAM: ICD-10-CM

## 2023-12-27 DIAGNOSIS — E11.21 TYPE 2 DIABETES MELLITUS WITH DIABETIC NEPHROPATHY, WITHOUT LONG-TERM CURRENT USE OF INSULIN (H): ICD-10-CM

## 2023-12-27 LAB
ANION GAP SERPL CALCULATED.3IONS-SCNC: 11 MMOL/L (ref 7–15)
BUN SERPL-MCNC: 22.7 MG/DL (ref 8–23)
CALCIUM SERPL-MCNC: 9.5 MG/DL (ref 8.8–10.2)
CHLORIDE SERPL-SCNC: 101 MMOL/L (ref 98–107)
CHOLEST SERPL-MCNC: 128 MG/DL
CREAT SERPL-MCNC: 0.94 MG/DL (ref 0.67–1.17)
DEPRECATED HCO3 PLAS-SCNC: 27 MMOL/L (ref 22–29)
EGFRCR SERPLBLD CKD-EPI 2021: 81 ML/MIN/1.73M2
FASTING STATUS PATIENT QL REPORTED: NO
GLUCOSE SERPL-MCNC: 135 MG/DL (ref 70–99)
HBA1C MFR BLD: 6.9 % (ref 0–5.6)
HDLC SERPL-MCNC: 52 MG/DL
LDLC SERPL CALC-MCNC: 56 MG/DL
NONHDLC SERPL-MCNC: 76 MG/DL
POTASSIUM SERPL-SCNC: 4.2 MMOL/L (ref 3.4–5.3)
SODIUM SERPL-SCNC: 139 MMOL/L (ref 135–145)
TRIGL SERPL-MCNC: 101 MG/DL

## 2023-12-27 PROCEDURE — G0439 PPPS, SUBSEQ VISIT: HCPCS | Performed by: FAMILY MEDICINE

## 2023-12-27 PROCEDURE — 80061 LIPID PANEL: CPT | Performed by: FAMILY MEDICINE

## 2023-12-27 PROCEDURE — 36415 COLL VENOUS BLD VENIPUNCTURE: CPT | Performed by: FAMILY MEDICINE

## 2023-12-27 PROCEDURE — 80048 BASIC METABOLIC PNL TOTAL CA: CPT | Performed by: FAMILY MEDICINE

## 2023-12-27 PROCEDURE — 83036 HEMOGLOBIN GLYCOSYLATED A1C: CPT | Performed by: FAMILY MEDICINE

## 2023-12-27 PROCEDURE — 99214 OFFICE O/P EST MOD 30 MIN: CPT | Mod: 25 | Performed by: FAMILY MEDICINE

## 2023-12-27 RX ORDER — GLIPIZIDE 10 MG/1
10 TABLET, FILM COATED, EXTENDED RELEASE ORAL DAILY
Qty: 90 TABLET | Refills: 3 | Status: SHIPPED | OUTPATIENT
Start: 2023-12-27

## 2023-12-27 RX ORDER — ORAL SEMAGLUTIDE 7 MG/1
TABLET ORAL
Qty: 90 TABLET | Refills: 3 | Status: SHIPPED | OUTPATIENT
Start: 2023-12-27

## 2023-12-27 RX ORDER — HYDROCHLOROTHIAZIDE 25 MG/1
25 TABLET ORAL DAILY
Qty: 90 TABLET | Refills: 3 | Status: SHIPPED | OUTPATIENT
Start: 2023-12-27

## 2023-12-27 RX ORDER — ATORVASTATIN CALCIUM 40 MG/1
40 TABLET, FILM COATED ORAL DAILY
Qty: 90 TABLET | Refills: 3 | Status: SHIPPED | OUTPATIENT
Start: 2023-12-27

## 2023-12-27 RX ORDER — TAMSULOSIN HYDROCHLORIDE 0.4 MG/1
0.4 CAPSULE ORAL DAILY
Qty: 90 CAPSULE | Refills: 3 | Status: SHIPPED | OUTPATIENT
Start: 2023-12-27

## 2023-12-27 RX ORDER — LISINOPRIL 40 MG/1
40 TABLET ORAL DAILY
Qty: 90 TABLET | Refills: 3 | Status: SHIPPED | OUTPATIENT
Start: 2023-12-27

## 2023-12-27 RX ORDER — METFORMIN HYDROCHLORIDE 750 MG/1
750 TABLET, EXTENDED RELEASE ORAL 2 TIMES DAILY WITH MEALS
Qty: 180 TABLET | Refills: 3 | Status: SHIPPED | OUTPATIENT
Start: 2023-12-27

## 2023-12-27 RX ORDER — METOPROLOL SUCCINATE 50 MG/1
50 TABLET, EXTENDED RELEASE ORAL DAILY
Qty: 90 TABLET | Refills: 3 | Status: SHIPPED | OUTPATIENT
Start: 2023-12-27

## 2023-12-27 ASSESSMENT — ENCOUNTER SYMPTOMS
WEAKNESS: 0
PALPITATIONS: 0
SORE THROAT: 0
HEMATOCHEZIA: 0
MYALGIAS: 0
CHILLS: 0
JOINT SWELLING: 0
HEMATURIA: 0
EYE PAIN: 0
NAUSEA: 0
NERVOUS/ANXIOUS: 0
ABDOMINAL PAIN: 0
FREQUENCY: 0
HEARTBURN: 0
FEVER: 0
DIARRHEA: 0
DYSURIA: 0
PARESTHESIAS: 1
CONSTIPATION: 0
HEADACHES: 0
COUGH: 0
ARTHRALGIAS: 0

## 2023-12-27 ASSESSMENT — ACTIVITIES OF DAILY LIVING (ADL): CURRENT_FUNCTION: NO ASSISTANCE NEEDED

## 2023-12-27 ASSESSMENT — PAIN SCALES - GENERAL: PAINLEVEL: NO PAIN (0)

## 2023-12-27 NOTE — PATIENT INSTRUCTIONS
Patient Education   Personalized Prevention Plan  You are due for the preventive services outlined below.  Your care team is available to assist you in scheduling these services.  If you have already completed any of these items, please share that information with your care team to update in your medical record.  Health Maintenance Due   Topic Date Due     ANNUAL REVIEW OF HM ORDERS  Never done     Zoster (Shingles) Vaccine (1 of 2) Never done     RSV VACCINE (Pregnancy & 60+) (1 - 1-dose 60+ series) Never done     Diabetic Foot Exam  12/16/2014     Eye Exam  01/06/2023     Cholesterol Lab  03/18/2023     Basic Metabolic Panel  09/28/2023     Kidney Microalbumin Urine Test  09/28/2023     Hearing Loss: Care Instructions  Overview     Hearing loss is a sudden or slow decrease in how well you hear. It can range from slight to profound. Permanent hearing loss can occur with aging. It also can happen when you are exposed long-term to loud noise. Examples include listening to loud music, riding motorcycles, or being around other loud machines.  Hearing loss can affect your work and home life. It can make you feel lonely or depressed. You may feel that you have lost your independence. But hearing aids and other devices can help you hear better and feel connected to others.  Follow-up care is a key part of your treatment and safety. Be sure to make and go to all appointments, and call your doctor if you are having problems. It's also a good idea to know your test results and keep a list of the medicines you take.  How can you care for yourself at home?  Avoid loud noises whenever possible. This helps keep your hearing from getting worse.  Always wear hearing protection around loud noises.  Wear a hearing aid as directed.  A professional can help you pick a hearing aid that will work best for you.  You can also get hearing aids over the counter for mild to moderate hearing loss.  Have hearing tests as your doctor suggests.  "They can show whether your hearing has changed. Your hearing aid may need to be adjusted.  Use other devices as needed. These may include:  Telephone amplifiers and hearing aids that can connect to a television, stereo, radio, or microphone.  Devices that use lights or vibrations. These alert you to the doorbell, a ringing telephone, or a baby monitor.  Television closed-captioning. This shows the words at the bottom of the screen. Most new TVs can do this.  TTY (text telephone). This lets you type messages back and forth on the telephone instead of talking or listening. These devices are also called TDD. When messages are typed on the keyboard, they are sent over the phone line to a receiving TTY. The message is shown on a monitor.  Use text messaging, social media, and email if it is hard for you to communicate by telephone.  Try to learn a listening technique called speechreading. It is not lipreading. You pay attention to people's gestures, expressions, posture, and tone of voice. These clues can help you understand what a person is saying. Face the person you are talking to, and have them face you. Make sure the lighting is good. You need to see the other person's face clearly.  Think about counseling if you need help to adjust to your hearing loss.  When should you call for help?  Watch closely for changes in your health, and be sure to contact your doctor if:    You think your hearing is getting worse.     You have new symptoms, such as dizziness or nausea.   Where can you learn more?  Go to https://www.[employer].net/patiented  Enter R798 in the search box to learn more about \"Hearing Loss: Care Instructions.\"  Current as of: February 28, 2023               Content Version: 13.8    2790-0138 [employer].   Care instructions adapted under license by your healthcare professional. If you have questions about a medical condition or this instruction, always ask your healthcare professional. " [employer] disclaims any warranty or liability for your use of this information.

## 2023-12-27 NOTE — PROGRESS NOTES
"SUBJECTIVE:   Cecilio is a 83 year old, presenting for the following:  Wellness Visit        12/27/2023    11:09 AM   Additional Questions   Roomed by Allison MAYO   Accompanied by Self         12/27/2023    11:09 AM   Patient Reported Additional Medications   Patient reports taking the following new medications .       Are you in the first 12 months of your Medicare coverage?  No    Healthy Habits:     In general, how would you rate your overall health?  Excellent    Frequency of exercise:  6-7 days/week    Duration of exercise:  15-30 minutes    Do you usually eat at least 4 servings of fruit and vegetables a day, include whole grains    & fiber and avoid regularly eating high fat or \"junk\" foods?  Yes    Taking medications regularly:  Yes    Medication side effects:  None    Ability to successfully perform activities of daily living:  No assistance needed    Home Safety:  No safety concerns identified    Hearing Impairment:  Difficulty understanding soft or whispered speech    In the past 6 months, have you been bothered by leaking of urine?  No    In general, how would you rate your overall mental or emotional health?  Excellent    Additional concerns today:  No      Today's PHQ-2 Score:       12/27/2023    10:59 AM   PHQ-2 ( 1999 [employer])   Q1: Little interest or pleasure in doing things 0   Q2: Feeling down, depressed or hopeless 0   PHQ-2 Score 0   Q1: Little interest or pleasure in doing things Not at all   Q2: Feeling down, depressed or hopeless Not at all   PHQ-2 Score 0           Have you ever done Advance Care Planning? (For example, a Health Directive, POLST, or a discussion with a medical provider or your loved ones about your wishes): Yes - will bring copy in        Fall risk  Fallen 2 or more times in the past year?: No  Any fall with injury in the past year?: No    Cognitive Screening   1) Repeat 3 items (Leader, Season, Table)    2) Clock draw: NORMAL  3) 3 item recall: Recalls 2 objects   Results: NORMAL " clock, 1-2 items recalled: COGNITIVE IMPAIRMENT LESS LIKELY    Mini-CogTM Copyright DOMINGA Braga. Licensed by the author for use in [facility]; reprinted with permission (flor@.[facility]). All rights reserved.      Do you have sleep apnea, excessive snoring or daytime drowsiness? : no    Reviewed and updated as needed this visit by clinical staff   Tobacco  Allergies  Meds              Reviewed and updated as needed this visit by Provider                 Social History     Tobacco Use     Smoking status: Never     Smokeless tobacco: Never   Substance Use Topics     Alcohol use: Yes     Comment: occasional social             12/27/2023    10:58 AM   Alcohol Use   Prescreen: >3 drinks/day or >7 drinks/week? No     Do you have a current opioid prescription? No  Do you use any other controlled substances or medications that are not prescribed by a provider? None      DM2 with nephropathy: recheck.  Metformin and rybelsus.    Htn: stable on meds.  Fills and labs  Bph: stable on flomax, fills  Hyperlipidemia: statin, stable    Current Outpatient Medications   Medication     aspirin 81 MG tablet     atorvastatin (LIPITOR) 40 MG tablet     blood glucose (NO BRAND SPECIFIED) test strip     blood glucose monitoring (SOFTCLIX) lancets     glipiZIDE (GLUCOTROL XL) 10 MG 24 hr tablet     hydrochlorothiazide (HYDRODIURIL) 25 MG tablet     lisinopril (ZESTRIL) 40 MG tablet     metFORMIN (GLUCOPHAGE-XR) 750 MG 24 hr tablet     metoprolol succinate ER (TOPROL XL) 50 MG 24 hr tablet     Semaglutide (RYBELSUS) 7 MG tablet     sildenafil (VIAGRA) 100 MG cap/tab     tamsulosin (FLOMAX) 0.4 MG capsule     vardenafil (LEVITRA) 20 MG tablet     No current facility-administered medications for this visit.            Wt Readings from Last 4 Encounters:   12/27/23 85.3 kg (188 lb)   05/22/23 86.2 kg (190 lb)   09/28/22 86.2 kg (190 lb)   03/18/22 85.7 kg (189 lb)                 BP Readings from Last 2 Encounters:   12/27/23 126/82    05/22/23 104/64     Hemoglobin A1C (%)   Date Value   12/27/2023 6.9 (H)   05/22/2023 6.8 (H)   05/10/2021 7.0 (H)   12/30/2020 10.2 (H)     LDL Cholesterol Calculated (mg/dL)   Date Value   03/18/2022 49   09/16/2020 35   04/24/2018 50       Current providers sharing in care for this patient include:   Patient Care Team:  Damion Morales MD as PCP - General  Damion Morales MD as Assigned PCP  Tejinder Jarvis MD as Assigned Surgical Provider  Bertha Norwood RD as Diabetes Educator (Dietitian, Registered)    The following health maintenance items are reviewed in Epic and correct as of today:  Health Maintenance   Topic Date Due     ANNUAL REVIEW OF  ORDERS  Never done     ZOSTER IMMUNIZATION (1 of 2) Never done     RSV VACCINE (Pregnancy & 60+) (1 - 1-dose 60+ series) Never done     DIABETIC FOOT EXAM  12/16/2014     EYE EXAM  01/06/2023     LIPID  03/18/2023     BMP  09/28/2023     MICROALBUMIN  09/28/2023     A1C  06/27/2024     MEDICARE ANNUAL WELLNESS VISIT  12/27/2024     FALL RISK ASSESSMENT  12/27/2024     ADVANCE CARE PLANNING  03/18/2027     DTAP/TDAP/TD IMMUNIZATION (3 - Td or Tdap) 10/01/2031     PHQ-2 (once per calendar year)  Completed     INFLUENZA VACCINE  Completed     Pneumococcal Vaccine: 65+ Years  Completed     COVID-19 Vaccine  Completed     IPV IMMUNIZATION  Aged Out     HPV IMMUNIZATION  Aged Out     MENINGITIS IMMUNIZATION  Aged Out     RSV MONOCLONAL ANTIBODY  Aged Out           Review of Systems   Constitutional:  Negative for chills and fever.   HENT:  Negative for congestion, ear pain, hearing loss and sore throat.    Eyes:  Negative for pain and visual disturbance.   Respiratory:  Negative for cough.    Cardiovascular:  Negative for chest pain, palpitations and peripheral edema.   Gastrointestinal:  Negative for abdominal pain, constipation, diarrhea, heartburn, hematochezia and nausea.   Genitourinary:  Positive for impotence. Negative for dysuria, frequency, genital sores,  "hematuria and urgency.   Musculoskeletal:  Negative for arthralgias, joint swelling and myalgias.   Skin:  Negative for rash.   Neurological:  Positive for paresthesias. Negative for weakness and headaches.   Psychiatric/Behavioral:  Negative for mood changes. The patient is not nervous/anxious.          OBJECTIVE:   /82   Pulse 71   Resp 16   Ht 1.689 m (5' 6.5\")   Wt 85.3 kg (188 lb)   SpO2 100%   BMI 29.89 kg/m   Estimated body mass index is 29.89 kg/m  as calculated from the following:    Height as of this encounter: 1.689 m (5' 6.5\").    Weight as of this encounter: 85.3 kg (188 lb).  Physical Exam  Gen: alert and oriented, in no acute distress, affect within normal limits  Neck: supple with no masses or nodes  Throat: oropharynx clear, no exudate or tonsillar/palate asymmetry.    CV: RRR, no murmur  Lungs: clear bilaterally with good effort  Abd: nontender, no mass  Ext: no edema or lesions   Neuro: moving all extremities, gait normal, no focal deficts noted      ASSESSMENT / PLAN:   Wellness visit  DM2 with nephropathy: recheck.  Metformin and rybelsus.    Htn: stable on meds.  Fills and labs  Bph: stable on flomax, fills  Hyperlipidemia: statin, stable    fills on meds for chronic issues as above in med list and orders.   Labs ordered as appropriate for monitoring the listed medical conditions.    Continue medications for medical issues as above in med list and orders      Back in 6 months            COUNSELING:  Reviewed preventive health counseling, as reflected in patient instructions       Regular exercise       Healthy diet/nutrition      BMI:   Estimated body mass index is 29.89 kg/m  as calculated from the following:    Height as of this encounter: 1.689 m (5' 6.5\").    Weight as of this encounter: 85.3 kg (188 lb).   Weight management plan: diet/exercise       He reports that he has never smoked. He has never used smokeless tobacco.      Appropriate preventive services were discussed with " this patient, including applicable screening as appropriate for fall prevention, nutrition, physical activity, Tobacco-use cessation, weight loss and cognition.  Checklist reviewing preventive services available has been given to the patient.    Reviewed patients plan of care and provided an AVS. The Basic Care Plan (routine screening as documented in Health Maintenance) for Cecilio meets the Care Plan requirement. This Care Plan has been established and reviewed with the Patient.      Damion Morales MD  [facility]    Identified Health Risks:  I have reviewed Opioid Use Disorder and Substance Use Disorder risk factors and made any needed referrals.   The patient was provided with written information regarding signs of hearing loss.

## 2023-12-27 NOTE — NURSING NOTE
"Chief Complaint   Patient presents with    Wellness Visit     /82   Pulse 71   Resp 16   Ht 1.689 m (5' 6.5\")   Wt 85.3 kg (188 lb)   SpO2 100%   BMI 29.89 kg/m   Estimated body mass index is 29.89 kg/m  as calculated from the following:    Height as of this encounter: 1.689 m (5' 6.5\").    Weight as of this encounter: 85.3 kg (188 lb).  Patient presents to the clinic using No DME      Health Maintenance that is potentially due pending provider review:    Health Maintenance Due   Topic Date Due    ANNUAL REVIEW OF HM ORDERS  Never done    ZOSTER IMMUNIZATION (1 of 2) Never done    RSV VACCINE (Pregnancy & 60+) (1 - 1-dose 60+ series) Never done    DIABETIC FOOT EXAM  12/16/2014    EYE EXAM  01/06/2023    LIPID  03/18/2023    MEDICARE ANNUAL WELLNESS VISIT  09/28/2023    BMP  09/28/2023    MICROALBUMIN  09/28/2023    A1C  11/22/2023        n/a          "

## 2024-01-03 ENCOUNTER — TELEPHONE (OUTPATIENT)
Dept: FAMILY MEDICINE | Facility: CLINIC | Age: 84
End: 2024-01-03
Payer: COMMERCIAL

## 2024-01-03 NOTE — TELEPHONE ENCOUNTER
Lobito 3, 2024 I spoke with Migel, he is in need of financial assistance for medication.    We reviewed the Prescription Assistance Program for manfacturer assistance programs, gross income, insurance and Rx list.     assistance application will be completed for: Rybelsus    When approved, Migel will receive this medication at no cost through December 2024.    Migel is over income for the Pharmacy Assistance Fund [place] $500.    Question- Both Viagra & Levitra are on Migel's med list. Neither have an assistance program. [employer] does have Cialis available. If Cialis would be an appropriate alternative, I would need the dose information for the Janae application.    Let me know!    Thanks so much for your help!    Gabbi Maynard  Prescription Assistance Supervisor  Pharmacy Assistance

## 2024-02-13 ENCOUNTER — OFFICE VISIT (OUTPATIENT)
Dept: DERMATOLOGY | Facility: CLINIC | Age: 84
End: 2024-02-13
Payer: COMMERCIAL

## 2024-02-13 ENCOUNTER — TELEPHONE (OUTPATIENT)
Dept: DERMATOLOGY | Facility: CLINIC | Age: 84
End: 2024-02-13

## 2024-02-13 DIAGNOSIS — L57.0 AK (ACTINIC KERATOSIS): ICD-10-CM

## 2024-02-13 DIAGNOSIS — Z85.828 HISTORY OF SKIN CANCER: ICD-10-CM

## 2024-02-13 DIAGNOSIS — D23.9 DERMAL NEVUS: Primary | ICD-10-CM

## 2024-02-13 DIAGNOSIS — L81.4 LENTIGO: ICD-10-CM

## 2024-02-13 DIAGNOSIS — L73.9 FOLLICULITIS: ICD-10-CM

## 2024-02-13 DIAGNOSIS — L82.1 SEBORRHEIC KERATOSES: ICD-10-CM

## 2024-02-13 DIAGNOSIS — D18.01 ANGIOMA OF SKIN: ICD-10-CM

## 2024-02-13 DIAGNOSIS — L21.9 DERMATITIS, SEBORRHEIC: ICD-10-CM

## 2024-02-13 DIAGNOSIS — D23.9 DERMAL NEVUS: ICD-10-CM

## 2024-02-13 PROCEDURE — 17000 DESTRUCT PREMALG LESION: CPT | Performed by: DERMATOLOGY

## 2024-02-13 PROCEDURE — 17003 DESTRUCT PREMALG LES 2-14: CPT | Performed by: DERMATOLOGY

## 2024-02-13 PROCEDURE — 99213 OFFICE O/P EST LOW 20 MIN: CPT | Mod: 25 | Performed by: DERMATOLOGY

## 2024-02-13 RX ORDER — DOXYCYCLINE 100 MG/1
100 CAPSULE ORAL 2 TIMES DAILY
Qty: 60 CAPSULE | Refills: 6 | Status: SHIPPED | OUTPATIENT
Start: 2024-02-13

## 2024-02-13 RX ORDER — FLUOCINOLONE ACETONIDE 0.25 MG/G
CREAM TOPICAL 2 TIMES DAILY
Qty: 60 G | Refills: 6 | Status: SHIPPED | OUTPATIENT
Start: 2024-02-13 | End: 2024-02-14

## 2024-02-13 RX ORDER — CICLOPIROX OLAMINE 7.7 MG/G
CREAM TOPICAL AT BEDTIME
Qty: 90 G | Refills: 6 | Status: SHIPPED | OUTPATIENT
Start: 2024-02-13

## 2024-02-13 NOTE — TELEPHONE ENCOUNTER
[facility] Call Center    Phone Message    May a detailed message be left on voicemail: yes     Reason for Call: Medication Question or concern regarding medication   Prescription Clarification  Name of Medication: fluocinolone (SYNALAR) 0.025 % cream   Prescribing Provider: Dr. Jarvis   Pharmacy:      [facility] [address] [place] [address] [address]      What on the order needs clarification? Pharmacy is requesting an alternative since this medication is on back order. Thank you      Action Taken: Message routed to:  Other: [place] [facility]    Travel Screening: Not Applicable    Thank you!  [facility]

## 2024-02-13 NOTE — LETTER
2/13/2024         RE: Cecilio Navarro  [address] [facility] [id number]        Dear Colleague,    Thank you for referring your patient, Cecilio Navarro, to the [facility]. Please see a copy of my visit note below.    Cecilio Navarro is an extremely pleasant 83 year old year old male patient here today for rash on face and back.  .  Patient has no other skin complaints today.  Remainder of the HPI, Meds, PMH, Allergies, FH, and SH was reviewed in chart.      Past Medical History:   Diagnosis Date     Actinic keratoses      Basal cell carcinoma      Type II or unspecified type diabetes mellitus without mention of complication, not stated as uncontrolled      Unspecified disorder of male genital organs      Unspecified essential hypertension        Past Surgical History:   Procedure Laterality Date     COLONOSCOPY  10/16/2013    Diverticulosis in sigmoid colon; repeat in 5 years; Surgeon: Garry Ambrose MD;  Location: [facility]     HC REMOVE TONSILS/ADENOIDS,<13 Y/O          Family History   Problem Relation Age of Onset     Hypertension Mother      Cerebrovascular Disease Mother         mentally affected     Cancer - colorectal Father         mets to liver  age76     Gastrointestinal Disease Brother         multilple colon polyps     Hypertension Brother      Gastrointestinal Disease Sister         reflux     Obesity Son      Allergies Daughter      Arthritis Brother      Melanoma No family hx of        Social History     Socioeconomic History     Marital status:      Spouse name: Not on file     Number of children: Not on file     Years of education: Not on file     Highest education level: Not on file   Occupational History     Not on file   Tobacco Use     Smoking status: Never     Smokeless tobacco: Never   Vaping Use     Vaping Use: Never used   Substance and Sexual Activity     Alcohol use: Yes     Comment: occasional social     Drug use: No     Sexual activity: Yes      Partners: Female   Other Topics Concern      Service No     Blood Transfusions No     Caffeine Concern Yes     Comment: 1-2 day     Occupational Exposure No     Hobby Hazards Yes     Comment: [employer]     Sleep Concern No     Stress Concern No     Weight Concern No     Special Diet Yes     Comment: Diabetic and low fat     Back Care No     Exercise Yes     Bike Helmet Not Asked     Seat Belt Yes     Self-Exams Yes     Comment: check blood sugars     Parent/sibling w/ CABG, MI or angioplasty before 65F 55M? Not Asked   Social History Narrative     Not on file     Social Determinants of Health     Financial Resource Strain: Low Risk  (12/27/2023)    Financial Resource Strain      Within the past 12 months, have you or your family members you live with been unable to get utilities (heat, electricity) when it was really needed?: No   Food Insecurity: Low Risk  (12/27/2023)    Food Insecurity      Within the past 12 months, did you worry that your food would run out before you got money to buy more?: No      Within the past 12 months, did the food you bought just not last and you didn t have money to get more?: No   Transportation Needs: Low Risk  (12/27/2023)    Transportation Needs      Within the past 12 months, has lack of transportation kept you from medical appointments, getting your medicines, non-medical meetings or appointments, work, or from getting things that you need?: No   Physical Activity: Not on file   Stress: Not on file   Social Connections: Not on file   Interpersonal Safety: Not on file   Housing Stability: Low Risk  (12/27/2023)    Housing Stability      Do you have housing? : Yes      Are you worried about losing your housing?: No       Outpatient Encounter Medications as of 2/13/2024   Medication Sig Dispense Refill     aspirin 81 MG tablet Take 81 mg by mouth daily       atorvastatin (LIPITOR) 40 MG tablet Take 1 tablet (40 mg) by mouth daily 90 tablet 3     blood glucose (NO BRAND  SPECIFIED) test strip Use to test blood sugar 1 times daily.  Accu-chek guide me strips. 100 strip 3     blood glucose monitoring (SOFTCLIX) lancets Use to test blood sugar 1 times daily 100 each 5     glipiZIDE (GLUCOTROL XL) 10 MG 24 hr tablet Take 1 tablet (10 mg) by mouth daily 90 tablet 3     hydrochlorothiazide (HYDRODIURIL) 25 MG tablet Take 1 tablet (25 mg) by mouth daily 90 tablet 3     lisinopril (ZESTRIL) 40 MG tablet Take 1 tablet (40 mg) by mouth daily 90 tablet 3     metFORMIN (GLUCOPHAGE-XR) 750 MG 24 hr tablet Take 1 tablet (750 mg) by mouth 2 times daily (with meals) 180 tablet 3     metoprolol succinate ER (TOPROL XL) 50 MG 24 hr tablet Take 1 tablet (50 mg) by mouth daily 90 tablet 3     Semaglutide (RYBELSUS) 7 MG tablet TAKE 1 TABLET BY MOUTH IN THE MORNING ON AN EMPTY STOMACH. NO OTHER MEDICATION/FOOD FOR 30 MINUTES. 90 tablet 3     sildenafil (VIAGRA) 100 MG cap/tab Take 1 tablet (100 mg) by mouth daily as needed for erectile dysfunction 3 tablet 11     tamsulosin (FLOMAX) 0.4 MG capsule Take 1 capsule (0.4 mg) by mouth daily 90 capsule 3     vardenafil (LEVITRA) 20 MG tablet Take 1 tablet (20 mg) by mouth daily as needed for erectile dysfunction (Patient not taking: Reported on 12/27/2023) 5 tablet 11     No facility-administered encounter medications on file as of 2/13/2024.             O:   NAD, WDWN, Alert & Oriented, Mood & Affect wnl, Vitals stable   General appearance normal   Vitals stable   Alert, oriented and in no acute distress     NLF red greasy scaly plaques   Inflammatory papules on trunk  Gritty scaly papule on face   Stuck on papules and brown macules on trunk and ext   Red papules on trunk  Flesh colored papules on trunk     The remainder of the full exam was normal; the following areas were examined:  conjunctiva/lids, , neck, peripheral vascular system, abdomen, lymph nodes, digits/nails, eccrine and apocrine glands, scalp/hair, face, neck, chest, abdomen, buttocks, back,  RUE, LUE, RLE, LLE       Eyes: Conjunctivae/lids:Normal     ENT: Lips, buccal mucosa, tongue: normal    MSK:Normal    Cardiovascular: peripheral edema none    Pulm: Breathing Normal    Lymph Nodes: No Head and Neck Lymphadenopathy     Neuro/Psych: Orientation:Alert and Orientedx3 ; Mood/Affect:normal       A/P:  1. Seborrheic keratosis, lentigo, angioma, dermal nevus, hx of non-melanoma skin cancer   2. Actinic keratosis forehead x3  LN2:  Treated with LN2 for 5s for 1-2 cycles. Warned risks of blistering, pain, pigment change, scarring, and incomplete resolution.  Advised patient to return if lesions do not completely resolve.  Wound care sheet given.  3. Folliculitis   Doxy twice daily  4. Seb derm  Loprox bedtime  Desonide daily  Return to clinic 6 weeks  It was a pleasure speaking to Cecilio Navarro today.  Previous clinic notes and pertinent laboratory tests were reviewed prior to Cecilio Navarro's visit.  Signs and Symptoms of skin cancer discussed with patient.  Patient encouraged to perform monthly skin exams.  UV precautions reviewed with patient.  Return to clinic 12 months      Again, thank you for allowing me to participate in the care of your patient.        Sincerely,        Tejinder Jarvis MD

## 2024-02-13 NOTE — PROGRESS NOTES
Cecilio Navarro is an extremely pleasant 83 year old year old male patient here today for rash on face and back.  .  Patient has no other skin complaints today.  Remainder of the HPI, Meds, PMH, Allergies, FH, and SH was reviewed in chart.      Past Medical History:   Diagnosis Date    Actinic keratoses     Basal cell carcinoma     Type II or unspecified type diabetes mellitus without mention of complication, not stated as uncontrolled     Unspecified disorder of male genital organs     Unspecified essential hypertension        Past Surgical History:   Procedure Laterality Date    COLONOSCOPY  10/16/2013    Diverticulosis in sigmoid colon; repeat in 5 years; Surgeon: Garry Ambrose MD;  Location: [facility]    HC REMOVE TONSILS/ADENOIDS,<11 Y/O          Family History   Problem Relation Age of Onset    Hypertension Mother     Cerebrovascular Disease Mother         mentally affected    Cancer - colorectal Father         mets to liver  age76    Gastrointestinal Disease Brother         multilple colon polyps    Hypertension Brother     Gastrointestinal Disease Sister         reflux    Obesity Son     Allergies Daughter     Arthritis Brother     Melanoma No family hx of        Social History     Socioeconomic History    Marital status:      Spouse name: Not on file    Number of children: Not on file    Years of education: Not on file    Highest education level: Not on file   Occupational History    Not on file   Tobacco Use    Smoking status: Never    Smokeless tobacco: Never   Vaping Use    Vaping Use: Never used   Substance and Sexual Activity    Alcohol use: Yes     Comment: occasional social    Drug use: No    Sexual activity: Yes     Partners: Female   Other Topics Concern     Service No    Blood Transfusions No    Caffeine Concern Yes     Comment: 1-2 day    Occupational Exposure No    Hobby Hazards Yes     Comment: [employer]    Sleep Concern No    Stress Concern No    Weight Concern No    Special  Diet Yes     Comment: Diabetic and low fat    Back Care No    Exercise Yes    Bike Helmet Not Asked    Seat Belt Yes    Self-Exams Yes     Comment: check blood sugars    Parent/sibling w/ CABG, MI or angioplasty before 65F 55M? Not Asked   Social History Narrative    Not on file     Social Determinants of Health     Financial Resource Strain: Low Risk  (12/27/2023)    Financial Resource Strain     Within the past 12 months, have you or your family members you live with been unable to get utilities (heat, electricity) when it was really needed?: No   Food Insecurity: Low Risk  (12/27/2023)    Food Insecurity     Within the past 12 months, did you worry that your food would run out before you got money to buy more?: No     Within the past 12 months, did the food you bought just not last and you didn t have money to get more?: No   Transportation Needs: Low Risk  (12/27/2023)    Transportation Needs     Within the past 12 months, has lack of transportation kept you from medical appointments, getting your medicines, non-medical meetings or appointments, work, or from getting things that you need?: No   Physical Activity: Not on file   Stress: Not on file   Social Connections: Not on file   Interpersonal Safety: Not on file   Housing Stability: Low Risk  (12/27/2023)    Housing Stability     Do you have housing? : Yes     Are you worried about losing your housing?: No       Outpatient Encounter Medications as of 2/13/2024   Medication Sig Dispense Refill    aspirin 81 MG tablet Take 81 mg by mouth daily      atorvastatin (LIPITOR) 40 MG tablet Take 1 tablet (40 mg) by mouth daily 90 tablet 3    blood glucose (NO BRAND SPECIFIED) test strip Use to test blood sugar 1 times daily.  Accu-chek guide me strips. 100 strip 3    blood glucose monitoring (SOFTCLIX) lancets Use to test blood sugar 1 times daily 100 each 5    glipiZIDE (GLUCOTROL XL) 10 MG 24 hr tablet Take 1 tablet (10 mg) by mouth daily 90 tablet 3     hydrochlorothiazide (HYDRODIURIL) 25 MG tablet Take 1 tablet (25 mg) by mouth daily 90 tablet 3    lisinopril (ZESTRIL) 40 MG tablet Take 1 tablet (40 mg) by mouth daily 90 tablet 3    metFORMIN (GLUCOPHAGE-XR) 750 MG 24 hr tablet Take 1 tablet (750 mg) by mouth 2 times daily (with meals) 180 tablet 3    metoprolol succinate ER (TOPROL XL) 50 MG 24 hr tablet Take 1 tablet (50 mg) by mouth daily 90 tablet 3    Semaglutide (RYBELSUS) 7 MG tablet TAKE 1 TABLET BY MOUTH IN THE MORNING ON AN EMPTY STOMACH. NO OTHER MEDICATION/FOOD FOR 30 MINUTES. 90 tablet 3    sildenafil (VIAGRA) 100 MG cap/tab Take 1 tablet (100 mg) by mouth daily as needed for erectile dysfunction 3 tablet 11    tamsulosin (FLOMAX) 0.4 MG capsule Take 1 capsule (0.4 mg) by mouth daily 90 capsule 3    vardenafil (LEVITRA) 20 MG tablet Take 1 tablet (20 mg) by mouth daily as needed for erectile dysfunction (Patient not taking: Reported on 12/27/2023) 5 tablet 11     No facility-administered encounter medications on file as of 2/13/2024.             O:   NAD, WDWN, Alert & Oriented, Mood & Affect wnl, Vitals stable   General appearance normal   Vitals stable   Alert, oriented and in no acute distress     NLF red greasy scaly plaques   Inflammatory papules on trunk  Gritty scaly papule on face   Stuck on papules and brown macules on trunk and ext   Red papules on trunk  Flesh colored papules on trunk     The remainder of the full exam was normal; the following areas were examined:  conjunctiva/lids, , neck, peripheral vascular system, abdomen, lymph nodes, digits/nails, eccrine and apocrine glands, scalp/hair, face, neck, chest, abdomen, buttocks, back, RUE, LUE, RLE, LLE       Eyes: Conjunctivae/lids:Normal     ENT: Lips, buccal mucosa, tongue: normal    MSK:Normal    Cardiovascular: peripheral edema none    Pulm: Breathing Normal    Lymph Nodes: No Head and Neck Lymphadenopathy     Neuro/Psych: Orientation:Alert and Orientedx3 ; Mood/Affect:normal        A/P:  1. Seborrheic keratosis, lentigo, angioma, dermal nevus, hx of non-melanoma skin cancer   2. Actinic keratosis forehead x3  LN2:  Treated with LN2 for 5s for 1-2 cycles. Warned risks of blistering, pain, pigment change, scarring, and incomplete resolution.  Advised patient to return if lesions do not completely resolve.  Wound care sheet given.  3. Folliculitis   Doxy twice daily  4. Seb derm  Loprox bedtime  Desonide daily  Return to clinic 6 weeks  It was a pleasure speaking to Cecilio Navarro today.  Previous clinic notes and pertinent laboratory tests were reviewed prior to Cecilio Navarro's visit.  Signs and Symptoms of skin cancer discussed with patient.  Patient encouraged to perform monthly skin exams.  UV precautions reviewed with patient.  Return to clinic 12 months

## 2024-02-14 RX ORDER — MOMETASONE FUROATE 1 MG/G
CREAM TOPICAL DAILY
Qty: 60 G | Refills: 6 | Status: SHIPPED | OUTPATIENT
Start: 2024-02-14

## 2024-02-14 NOTE — TELEPHONE ENCOUNTER
Please advise if strength of Mometasone medication is a good substitute for 0.25 % Synalar?    Katie Ahumada RN

## 2024-02-18 ENCOUNTER — TELEPHONE (OUTPATIENT)
Dept: FAMILY MEDICINE | Facility: CLINIC | Age: 84
End: 2024-02-18
Payer: COMMERCIAL

## 2024-02-18 NOTE — TELEPHONE ENCOUNTER
Prior Authorization Retail Medication Request    Medication/Dose: rybelsus 7mg tabs  Diagnosis and ICD code (if different than what is on RX):     New/renewal/insurance change PA/secondary ins. PA:  Previously Tried and Failed:     Rationale:  pt is stable on this med since 2021    Insurance   Primary: covermymeds [id number]  Insurance ID:       Secondary (if applicable):   Insurance ID:       Pharmacy Information (if different than what is on RX)  Name:     Phone:     Fax:

## 2024-03-01 NOTE — TELEPHONE ENCOUNTER
PA Initiation    Medication: RYBELSUS 7 MG PO TABS  Insurance Company: [employer] ([facility]) - Phone [phone] Fax [phone]  Pharmacy Filling the Rx: [facility] [address] [place] [address] [address] [facility]  Filling Pharmacy Phone: [phone]  Filling Pharmacy Fax: [phone]  Start Date: 3/1/2024           Thank you,     Simone Wood [facility]  Pharmacy Clinic [facility]  [email or web address]   Phone: [phone]  Fax: [phone]

## 2024-03-01 NOTE — TELEPHONE ENCOUNTER
Prior Authorization Approval    Medication: RYBELSUS 7 MG PO TABS  Authorization Effective Date: 1/1/2024  Authorization Expiration Date: 12/31/2024  Approved Dose/Quantity: 30 tablets per 30 days  Reference #: [id number]   Insurance Company: [employer] ([facility]) - Phone [phone] Fax [phone]  Expected CoPay: $    CoPay Card Available:      Financial Assistance Needed: No  Which Pharmacy is filling the prescription: [facility] [address] [place] [address] [address] [facility]  Pharmacy Notified: Yes  Patient Notified: Yes **pharmacy will notify pt when it's ready when it's able to be filled again**           Thank you,     Simone Wood [facility]  Pharm[facility] Clinic [facility] [email or web address]   Phone: [phone]  Fax: [phone]

## 2024-03-25 ENCOUNTER — TELEPHONE (OUTPATIENT)
Dept: DERMATOLOGY | Facility: CLINIC | Age: 84
End: 2024-03-25
Payer: COMMERCIAL

## 2024-03-25 NOTE — TELEPHONE ENCOUNTER
[facility] Call Center    Phone Message    May a detailed message be left on voicemail: no     Reason for Call: Other: Pt's wife, Shanice calling to check on appt on 3/27.  Wanted this to be a phone call.  Please call Cecilio at: [phone] to confirm appt/ not in person- which is what it is.     Action Taken: Other: [place] [facility]    Travel Screening: Not Applicable

## 2024-04-18 ENCOUNTER — TELEPHONE (OUTPATIENT)
Dept: FAMILY MEDICINE | Facility: CLINIC | Age: 84
End: 2024-04-18
Payer: COMMERCIAL

## 2024-04-18 NOTE — TELEPHONE ENCOUNTER
Migel's Oscarbelsus assistance application has been submitted to the [employer] assistance program.    We will note Epic when we receive a decision.    Gabbi Maynard  Prescription Assistance Supervisor  Pharmacy Assistance

## 2024-04-22 ENCOUNTER — VIRTUAL VISIT (OUTPATIENT)
Dept: DERMATOLOGY | Facility: CLINIC | Age: 84
End: 2024-04-22
Payer: COMMERCIAL

## 2024-04-22 DIAGNOSIS — L73.9 FOLLICULITIS: Primary | ICD-10-CM

## 2024-04-22 PROCEDURE — 99442 PR PHYSICIAN TELEPHONE EVALUATION 11-20 MIN: CPT | Mod: 93 | Performed by: DERMATOLOGY

## 2024-04-22 NOTE — PROGRESS NOTES
"    Cecilio Navarro is a 83 year old male who is being evaluated via a phone  visit.      The patient has been notified of following:     \"This phone  visit will be conducted via a call between you and your physician/provider. We have found that certain health care needs can be provided without the need for an in-person physical exam.  This service lets us provide the care you need with a video conversation.  If a prescription is necessary we can send it directly to your pharmacy.  If lab work is needed we can place an order for that and you can then stop by our lab to have the test done at a later time.    Phone visits are billed at different rates depending on your insurance coverage.  Please reach out to your insurance provider with any questions.    If during the course of the call the physician/provider feels a phone visit is not appropriate, you will not be charged for this service.\"    Patient has given verbal consent for phone visit? Yes    How would you like to obtain your AVS? Umu    Cecilio Navarro is a 83 year old year old male patient here today for follow up folliculitis.  All clear.  Patient has no other skin complaints today.  Remainder of the HPI, Meds, PMH, Allergies, FH, and SH was reviewed in chart.      Past Medical History:   Diagnosis Date    Actinic keratoses     Basal cell carcinoma     Type II or unspecified type diabetes mellitus without mention of complication, not stated as uncontrolled     Unspecified disorder of male genital organs     Unspecified essential hypertension        Past Surgical History:   Procedure Laterality Date    COLONOSCOPY  10/16/2013    Diverticulosis in sigmoid colon; repeat in 5 years; Surgeon: Garry Ambrose MD;  Location: [facility]    HC REMOVE TONSILS/ADENOIDS,<13 Y/O          Family History   Problem Relation Age of Onset    Hypertension Mother     Cerebrovascular Disease Mother         mentally affected    Cancer - colorectal Father         mets to " liver  age76    Gastrointestinal Disease Brother         multilple colon polyps    Hypertension Brother     Gastrointestinal Disease Sister         reflux    Obesity Son     Allergies Daughter     Arthritis Brother     Melanoma No family hx of        Social History     Socioeconomic History    Marital status:      Spouse name: Not on file    Number of children: Not on file    Years of education: Not on file    Highest education level: Not on file   Occupational History    Not on file   Tobacco Use    Smoking status: Never    Smokeless tobacco: Never   Vaping Use    Vaping status: Never Used   Substance and Sexual Activity    Alcohol use: Yes     Comment: occasional social    Drug use: No    Sexual activity: Yes     Partners: Female   Other Topics Concern     Service No    Blood Transfusions No    Caffeine Concern Yes     Comment: 1-2 day    Occupational Exposure No    Hobby Hazards Yes     Comment: [employer]    Sleep Concern No    Stress Concern No    Weight Concern No    Special Diet Yes     Comment: Diabetic and low fat    Back Care No    Exercise Yes    Bike Helmet Not Asked    Seat Belt Yes    Self-Exams Yes     Comment: check blood sugars    Parent/sibling w/ CABG, MI or angioplasty before 65F 55M? Not Asked   Social History Narrative    Not on file     Social Determinants of Health     Financial Resource Strain: Low Risk  (12/27/2023)    Financial Resource Strain     Within the past 12 months, have you or your family members you live with been unable to get utilities (heat, electricity) when it was really needed?: No   Food Insecurity: Low Risk  (12/27/2023)    Food Insecurity     Within the past 12 months, did you worry that your food would run out before you got money to buy more?: No     Within the past 12 months, did the food you bought just not last and you didn t have money to get more?: No   Transportation Needs: Low Risk  (12/27/2023)    Transportation Needs     Within the past 12 months, has  lack of transportation kept you from medical appointments, getting your medicines, non-medical meetings or appointments, work, or from getting things that you need?: No   Physical Activity: Not on file   Stress: Not on file   Social Connections: Not on file   Interpersonal Safety: Not on file   Housing Stability: Low Risk  (12/27/2023)    Housing Stability     Do you have housing? : Yes     Are you worried about losing your housing?: No       Outpatient Encounter Medications as of 4/22/2024   Medication Sig Dispense Refill    aspirin 81 MG tablet Take 81 mg by mouth daily      atorvastatin (LIPITOR) 40 MG tablet Take 1 tablet (40 mg) by mouth daily 90 tablet 3    blood glucose (NO BRAND SPECIFIED) test strip Use to test blood sugar 1 times daily.  Accu-chek guide me strips. 100 strip 3    blood glucose monitoring (SOFTCLIX) lancets Use to test blood sugar 1 times daily 100 each 5    ciclopirox (LOPROX) 0.77 % cream Apply topically at bedtime 90 g 6    doxycycline monohydrate (MONODOX) 100 MG capsule Take 1 capsule (100 mg) by mouth 2 times daily 60 capsule 6    glipiZIDE (GLUCOTROL XL) 10 MG 24 hr tablet Take 1 tablet (10 mg) by mouth daily 90 tablet 3    hydrochlorothiazide (HYDRODIURIL) 25 MG tablet Take 1 tablet (25 mg) by mouth daily 90 tablet 3    lisinopril (ZESTRIL) 40 MG tablet Take 1 tablet (40 mg) by mouth daily 90 tablet 3    metFORMIN (GLUCOPHAGE-XR) 750 MG 24 hr tablet Take 1 tablet (750 mg) by mouth 2 times daily (with meals) 180 tablet 3    metoprolol succinate ER (TOPROL XL) 50 MG 24 hr tablet Take 1 tablet (50 mg) by mouth daily 90 tablet 3    mometasone (ELOCON) 0.1 % external cream Apply topically daily To the back 60 g 6    Semaglutide (RYBELSUS) 7 MG tablet TAKE 1 TABLET BY MOUTH IN THE MORNING ON AN EMPTY STOMACH. NO OTHER MEDICATION/FOOD FOR 30 MINUTES. 90 tablet 3    sildenafil (VIAGRA) 100 MG cap/tab Take 1 tablet (100 mg) by mouth daily as needed for erectile dysfunction 3 tablet 11     tamsulosin (FLOMAX) 0.4 MG capsule Take 1 capsule (0.4 mg) by mouth daily 90 capsule 3    vardenafil (LEVITRA) 20 MG tablet Take 1 tablet (20 mg) by mouth daily as needed for erectile dysfunction (Patient not taking: Reported on 12/27/2023) 5 tablet 11     No facility-administered encounter medications on file as of 4/22/2024.             Review Of Systems  Skin: As above  Eyes: negative  Ears/Nose/Throat: negative  Respiratory: No shortness of breath, dyspnea on exertion, cough, or hemoptysis  Cardiovascular: negative  Gastrointestinal: negative  Genitourinary: negative  Musculoskeletal: negative  Neurologic: negative  Psychiatric: negative  Hematologic/Lymphatic/Immunologic: negative  Endocrine: negative      O:   Alert & Orientedx3, Mood & Affect wnl,    General appearance normal   Alert, oriented and in no acute distress    Clear per patent     Pulm: Breathing Normal, talking in normal sentences, no shortness of breath during conversation    Neuro/Psych: Orientation:Alert and Orientedx3 ; Mood/Affect:normal ; no anxiety or depression     A/P:  1.folliculitis   Clear  Decrease doxy to once daily  Cont skin care   Return to clinic 4 months  It was a pleasure speaking to Cecilio Navarro today.  Previous clinic  notes and pertinent laboratory tests were reviewed prior to Cecilio Navarro's visit.    Teledermatology information:  - Location of patient: home  - Location of teledermatologist: [facility]   - Reason teledermatology is appropriate: of National Emergency Regarding Coronavirus disease (COVID 19) Outbreak  - The patient's condition can safely be assessed using telemedicine: yes  - Method of transmission: store and forward teledermatology  - In-person dermatology visit recommendation: no  - Service start time:915am/pm  - Service end time:932am/pm  - Date of report: 04/22/24

## 2024-04-22 NOTE — LETTER
"    4/22/2024         RE: Cecilio Navarro  [address] [facility] [address]        Dear Colleague,    Thank you for referring your patient, Cecilio Navarro, to the [facility]. Please see a copy of my visit note below.        Cecilio Navarro is a 83 year old male who is being evaluated via a phone  visit.      The patient has been notified of following:     \"This phone  visit will be conducted via a call between you and your physician/provider. We have found that certain health care needs can be provided without the need for an in-person physical exam.  This service lets us provide the care you need with a video conversation.  If a prescription is necessary we can send it directly to your pharmacy.  If lab work is needed we can place an order for that and you can then stop by our lab to have the test done at a later time.    Phone visits are billed at different rates depending on your insurance coverage.  Please reach out to your insurance provider with any questions.    If during the course of the call the physician/provider feels a phone visit is not appropriate, you will not be charged for this service.\"    Patient has given verbal consent for phone visit? Yes    How would you like to obtain your AVS? MyChart    Cecilio Navarro is a 83 year old year old male patient here today for follow up folliculitis.  All clear.  Patient has no other skin complaints today.  Remainder of the HPI, Meds, PMH, Allergies, FH, and SH was reviewed in chart.      Past Medical History:   Diagnosis Date     Actinic keratoses      Basal cell carcinoma      Type II or unspecified type diabetes mellitus without mention of complication, not stated as uncontrolled      Unspecified disorder of male genital organs      Unspecified essential hypertension        Past Surgical History:   Procedure Laterality Date     COLONOSCOPY  10/16/2013    Diverticulosis in sigmoid colon; repeat in 5 years; Surgeon: Arnol" Garry Jhaveri MD;  Location: [facility]     HC REMOVE TONSILS/ADENOIDS,<13 Y/O          Family History   Problem Relation Age of Onset     Hypertension Mother      Cerebrovascular Disease Mother         mentally affected     Cancer - colorectal Father         mets to liver  age76     Gastrointestinal Disease Brother         multilple colon polyps     Hypertension Brother      Gastrointestinal Disease Sister         reflux     Obesity Son      Allergies Daughter      Arthritis Brother      Melanoma No family hx of        Social History     Socioeconomic History     Marital status:      Spouse name: Not on file     Number of children: Not on file     Years of education: Not on file     Highest education level: Not on file   Occupational History     Not on file   Tobacco Use     Smoking status: Never     Smokeless tobacco: Never   Vaping Use     Vaping status: Never Used   Substance and Sexual Activity     Alcohol use: Yes     Comment: occasional social     Drug use: No     Sexual activity: Yes     Partners: Female   Other Topics Concern      Service No     Blood Transfusions No     Caffeine Concern Yes     Comment: 1-2 day     Occupational Exposure No     Hobby Hazards Yes     Comment: [employer]     Sleep Concern No     Stress Concern No     Weight Concern No     Special Diet Yes     Comment: Diabetic and low fat     Back Care No     Exercise Yes     Bike Helmet Not Asked     Seat Belt Yes     Self-Exams Yes     Comment: check blood sugars     Parent/sibling w/ CABG, MI or angioplasty before 65F 55M? Not Asked   Social History Narrative     Not on file     Social Determinants of Health     Financial Resource Strain: Low Risk  (12/27/2023)    Financial Resource Strain      Within the past 12 months, have you or your family members you live with been unable to get utilities (heat, electricity) when it was really needed?: No   Food Insecurity: Low Risk  (12/27/2023)    Food Insecurity      Within the past 12  months, did you worry that your food would run out before you got money to buy more?: No      Within the past 12 months, did the food you bought just not last and you didn t have money to get more?: No   Transportation Needs: Low Risk  (12/27/2023)    Transportation Needs      Within the past 12 months, has lack of transportation kept you from medical appointments, getting your medicines, non-medical meetings or appointments, work, or from getting things that you need?: No   Physical Activity: Not on file   Stress: Not on file   Social Connections: Not on file   Interpersonal Safety: Not on file   Housing Stability: Low Risk  (12/27/2023)    Housing Stability      Do you have housing? : Yes      Are you worried about losing your housing?: No       Outpatient Encounter Medications as of 4/22/2024   Medication Sig Dispense Refill     aspirin 81 MG tablet Take 81 mg by mouth daily       atorvastatin (LIPITOR) 40 MG tablet Take 1 tablet (40 mg) by mouth daily 90 tablet 3     blood glucose (NO BRAND SPECIFIED) test strip Use to test blood sugar 1 times daily.  Accu-chek guide me strips. 100 strip 3     blood glucose monitoring (SOFTCLIX) lancets Use to test blood sugar 1 times daily 100 each 5     ciclopirox (LOPROX) 0.77 % cream Apply topically at bedtime 90 g 6     doxycycline monohydrate (MONODOX) 100 MG capsule Take 1 capsule (100 mg) by mouth 2 times daily 60 capsule 6     glipiZIDE (GLUCOTROL XL) 10 MG 24 hr tablet Take 1 tablet (10 mg) by mouth daily 90 tablet 3     hydrochlorothiazide (HYDRODIURIL) 25 MG tablet Take 1 tablet (25 mg) by mouth daily 90 tablet 3     lisinopril (ZESTRIL) 40 MG tablet Take 1 tablet (40 mg) by mouth daily 90 tablet 3     metFORMIN (GLUCOPHAGE-XR) 750 MG 24 hr tablet Take 1 tablet (750 mg) by mouth 2 times daily (with meals) 180 tablet 3     metoprolol succinate ER (TOPROL XL) 50 MG 24 hr tablet Take 1 tablet (50 mg) by mouth daily 90 tablet 3     mometasone (ELOCON) 0.1 % external  cream Apply topically daily To the back 60 g 6     Semaglutide (RYBELSUS) 7 MG tablet TAKE 1 TABLET BY MOUTH IN THE MORNING ON AN EMPTY STOMACH. NO OTHER MEDICATION/FOOD FOR 30 MINUTES. 90 tablet 3     sildenafil (VIAGRA) 100 MG cap/tab Take 1 tablet (100 mg) by mouth daily as needed for erectile dysfunction 3 tablet 11     tamsulosin (FLOMAX) 0.4 MG capsule Take 1 capsule (0.4 mg) by mouth daily 90 capsule 3     vardenafil (LEVITRA) 20 MG tablet Take 1 tablet (20 mg) by mouth daily as needed for erectile dysfunction (Patient not taking: Reported on 12/27/2023) 5 tablet 11     No facility-administered encounter medications on file as of 4/22/2024.             Review Of Systems  Skin: As above  Eyes: negative  Ears/Nose/Throat: negative  Respiratory: No shortness of breath, dyspnea on exertion, cough, or hemoptysis  Cardiovascular: negative  Gastrointestinal: negative  Genitourinary: negative  Musculoskeletal: negative  Neurologic: negative  Psychiatric: negative  Hematologic/Lymphatic/Immunologic: negative  Endocrine: negative      O:   Alert & Orientedx3, Mood & Affect wnl,    General appearance normal   Alert, oriented and in no acute distress    Clear per patent     Pulm: Breathing Normal, talking in normal sentences, no shortness of breath during conversation    Neuro/Psych: Orientation:Alert and Orientedx3 ; Mood/Affect:normal ; no anxiety or depression     A/P:  1.folliculitis   Clear  Decrease doxy to once daily  Cont skin care   Return to clinic 4 months  It was a pleasure speaking to Cecilio Navarro today.  Previous clinic  notes and pertinent laboratory tests were reviewed prior to Cecilio Navarro's visit.    Teledermatology information:  - Location of patient: home  - Location of teledermatologist: [facility]   - Reason teledermatology is appropriate: of National Emergency Regarding Coronavirus disease (COVID 19) Outbreak  - The patient's condition can safely be assessed using telemedicine: yes  - Method of  transmission: store and forward teledermatology  - In-person dermatology visit recommendation: no  - Service start time:915am/pm  - Service end time:932am/pm  - Date of report: 04/22/24      Again, thank you for allowing me to participate in the care of your patient.        Sincerely,        Tejinder Jarvis MD

## 2024-05-02 ENCOUNTER — TELEPHONE (OUTPATIENT)
Dept: FAMILY MEDICINE | Facility: CLINIC | Age: 84
End: 2024-05-02
Payer: COMMERCIAL

## 2024-05-02 NOTE — TELEPHONE ENCOUNTER
Cecilio has been approved to the  assistance program(s) for Rybelsus through December 31, 2024.      He will receive, at no cost, 90 day supplies of Rybelsus 7mg delivered to the home through the enrollment period.     The patient has been informed of this approval and delivery schedule.     Patients call for their own refills and will receive a detailed letter of instructions from our office    Thank you for your assistance,  Genie Winchester  Prescription   Please send responses to [facility]

## 2024-05-03 ENCOUNTER — TELEPHONE (OUTPATIENT)
Dept: FAMILY MEDICINE | Facility: CLINIC | Age: 84
End: 2024-05-03
Payer: COMMERCIAL

## 2024-05-08 NOTE — PROGRESS NOTES
5/8/2024         RE: Dinora Mcghee  [address] [facility] [address]        Dear Colleague,    Thank you for referring your patient, Dinora Mcghee, to the [facility]. Please see a copy of my visit note below.    Medication Therapy Management (MTM) Encounter    ASSESSMENT:                            Medication Adherence/Access: {adherencechoices:050716}    ***:  ***      PLAN:                            Reminder montelukast comes in chewable tablet.  Hattie to update amitriptyline liquid.  Liquid -- https://www.Truli/bottle-Acetaminophen-Cherry-Flavored-Liquid/dp/D381LM64V3/ref=sr_1_1?crid=[email or web address]&terrance=hbI4TjsqDCB3.0Hmjm5QK87pLL8Ejuw0d8BUXtXW5wf0KkjhnOqi3d02.aZcAmGbWtobpncRGEsj0fVjaMoRd47fFyZIy1jEtJl3&dib_tag=se&keywords=gericare+liquid+acetaminophen+sugar+free&wye=[id number]&sprefix=gericare+liquid+acetaminophen+sugar+kaylyn%2Caps%2C180&sr=8-1  Hattie to f/up on enema cost/options.    Follow-up: as needed     SUBJECTIVE/OBJECTIVE:                          Dinora Mcghee is a 60 year old female called for a follow-up visit.       Reason for visit: alternate medication dosage forms    Allergies/ADRs: Reviewed in chart  Tobacco: She reports that she quit smoking about 32 years ago. Her smoking use included cigarettes. She started smoking about 38 years ago. She has a 3.2 pack-year smoking history. She does not have any smokeless tobacco history on file.      Medication Adherence/Access:   -- levothyroxine: oral solution (ordered after last visit) -- did cover this and got it. Doesn't feel any different.  -- duloxetine: mixed sprinkle caps (ordered after last visit) -- did cover this and got it. Did double the dose and feels like these may be starting to work. She is sleeping better. Notes stomach pain has subsided and no longer having diarrhea.  -- montelukast: chewable tablets (through Dr. Andrade) -- Haven't had a chance to ask this yet.  -- baclofen: liquid (through  "Diabetes Self-Management Education & Support    Presents for: Individual review    SUBJECTIVE/OBJECTIVE:  Presents for: Individual review  Diabetes type: Type 2  Cultural Influences/Ethnic Background:  American      Diabetes Symptoms & Complications:          Patient Problem List and Family Medical History reviewed for relevant medical history, current medical status, and diabetes risk factors.    Vitals:  There were no vitals taken for this visit.  Estimated body mass index is 33.71 kg/m  as calculated from the following:    Height as of 12/30/20: 1.689 m (5' 6.5\").    Weight as of 12/30/20: 96.2 kg (212 lb).   Last 3 BP:   BP Readings from Last 3 Encounters:   12/30/20 132/80   09/16/20 (!) 158/84   08/03/20 (!) 156/84       History   Smoking Status     Never Smoker   Smokeless Tobacco     Never Used       Labs:  Lab Results   Component Value Date    A1C 10.2 12/30/2020     Lab Results   Component Value Date     11/07/2019     Lab Results   Component Value Date    LDL 35 09/16/2020     HDL Cholesterol   Date Value Ref Range Status   09/16/2020 43 >39 mg/dL Final   ]  GFR Estimate   Date Value Ref Range Status   11/07/2019 82 >60 mL/min/[1.73_m2] Final     Comment:     Non  GFR Calc  Starting 12/18/2018, serum creatinine based estimated GFR (eGFR) will be   calculated using the Chronic Kidney Disease Epidemiology Collaboration   (CKD-EPI) equation.       GFR Estimate If Black   Date Value Ref Range Status   11/07/2019 >90 >60 mL/min/[1.73_m2] Final     Comment:      GFR Calc  Starting 12/18/2018, serum creatinine based estimated GFR (eGFR) will be   calculated using the Chronic Kidney Disease Epidemiology Collaboration   (CKD-EPI) equation.       Lab Results   Component Value Date    CR 0.90 11/07/2019     No results found for: MICROALBUMIN    Healthy Eating:  Healthy Eating Assessed Today: Yes  Breakfast: cereal or oatmeal on saturday and egg and you on sunday, splitting " "pain management) -- Doesn't think this was covered. She thinks they did a prior authorization, and she thinks it's in a wxft-oo-axlc status.  -- amitriptyline compound: (through [employer] compound[employer]) Is doing 20 mL of this.  -- sugar free acetaminophen: (OTC) has   -- pink lady enemas:       Gastroparesis/Small Bowel Tube:   ***    :    Notes that enemas daily would be ideal but she can't tolerate them. Notes that she can't tolerate them daily because it's a total body experience -- sweating, pain etc. She is seeing colorectal surgeon next week, wondering if there is any tight juncture or partial blockage. Notes pain/clicking in the same place. Notes that she was paying 225 for 60 of them before and now it is 350 for 12 of them. Notes all enemas are able to produce a result, but they are all horrible. She lays on the bathroom floor, for sometimes over an hour. Notes after her enema she had six bowel movements, but it was much later. Notes she doesn't get a good response from suppositories. She continues to take oral medications. Notes she hasn't had a \"normal\" stools. Notes right now she is using Fleet's suppositories. She did try this yesterday without an effect. She has done pelvic floor therapy, and has been applying this. She does feel it has helped, but she is still not going most days without an enema. Does feel that this is mechanical.     Today's Vitals: There were no vitals taken for this visit.  ----------------  {CHARLIE?:863288}    I spent 24 minutes with this patient today. { :830597}. A copy of the visit note was provided to the patient's provider(s).    A summary of these recommendations {GIVEN/NOT GIVEN:327701}.    Hattie VanceD, BCACP  MTM Pharmacist   [facility]   Phone: [phone]    Telemedicine Visit Details  Type of service:  Telephone visit  Start Time:  9:01 AM  End Time:  9:25 AM     Medication Therapy Recommendations  No medication therapy recommendations to " bagel  Lunch: fried egg sandwich and yogurt or cambell noodle soup  Dinner: rotiserre chicken and coleslaw or ground beef yanna with veggies, fried potatoes or leftover pizza one pc  Snacks: popcorn and grapes.    Being Active:  Being Active Assessed Today: Yes  Exercise:: Yes    Monitoring:   a1c was 10%  He is not testing blood sugars.      Taking Medications:  Diabetes Medication(s)     Biguanides       metFORMIN (GLUCOPHAGE) 1000 MG tablet    Take 1 tablet (1,000 mg) by mouth 2 times daily (with meals)    Sulfonylureas       glipiZIDE (GLUCOTROL XL) 10 MG 24 hr tablet    Take 1 tablet (10 mg) by mouth daily               Problem Solving:     Not assessed            Reducing Risks:   not assessed    Healthy Coping:     Patient Activation Measure Survey Score:  REMINGTON Score (Last Two) 11/15/2010   REMINGTON Raw Score 52   Activation Score 100   REMINGTON Level 4       Diabetes knowledge and skills assessment:   Patient is knowledgeable in diabetes management concepts related to: Healthy Eating, Being Active and Monitoring    Patient needs further education on the following diabetes management concepts: Healthy Eating, Being Active, Monitoring, Taking Medication, Problem Solving, Reducing Risks and Healthy Coping    Based on learning assessment above, most appropriate setting for further diabetes education would be: Individual setting.      INTERVENTIONS:    Education provided today on:  AADE Self-Care Behaviors:  Healthy Eating: consistency in amount, composition, and timing of food intake and portion control  Being Active: relationship to blood glucose and describe appropriate activity program  Monitoring: individual blood glucose targets and frequency of monitoring  Taking Medication: action of prescribed medication, side effects of prescribed medications and when to take medications    Opportunities for ongoing education and support in diabetes-self management were discussed.    Pt verbalized understanding of concepts  discussed and recommendations provided today.       Education Materials Provided:  No new materials provided today      ASSESSMENT:  Blood sugars higher.  Needs more medication.  Pt would like to try a GLP1, his brother and son are on one.  Will check with Dr. Morales and go from there.  Went over how to use a pen so he is ready when alternate is started. Will call him in a couple weeks to check his numbers.  They called back with insurance info: Rybelsus is covered best and that is a pill form of GLP1.  Taught him the accu-chek guide me meter and sent in strips.      Patient's most recent   Lab Results   Component Value Date    A1C 10.2 12/30/2020    is not meeting goal of <8.0    PLAN  See Patient Instructions for co-developed, patient-stated behavior change goals.  AVS printed and provided to patient today. See Follow-Up section for recommended follow-up.      Time Spent: 60 minutes  Encounter Type: Individual    Any diabetes medication dose changes were made via the CDE Protocol and Collaborative Practice Agreement with the patient's primary care provider. A copy of this encounter was shared with the provider.     display         Again, thank you for allowing me to participate in the care of your patient.        Sincerely,        Hattie Lazo RPH

## 2024-05-13 ENCOUNTER — TELEPHONE (OUTPATIENT)
Dept: DERMATOLOGY | Facility: CLINIC | Age: 84
End: 2024-05-13
Payer: COMMERCIAL

## 2024-05-13 NOTE — TELEPHONE ENCOUNTER
[facility] Call Center    Phone Message    May a detailed message be left on voicemail: yes     Reason for Call: Symptoms or Concerns     Current symptom or concern: New spot growing fast    Symptoms have been present for:  1 month(s)    Has patient previously been seen for this? No    By : NA    Date: NA    Are there any new or worsening symptoms? Yes: Patient said that the spot just all of a sudden appeared and growing fast. Seems like it wants to erupt patients wife said. Patient has Hx of removals. Please call patient back to discuss. Thanks.     Action Taken: Other: [facility]    Travel Screening: Not Applicable

## 2024-05-13 NOTE — TELEPHONE ENCOUNTER
Called patient- scheduled for 5/21/24 with Dr. Jarvis for a spot check.  Thank you,    Harika LOPEZRN BSN  [facility]- [phone]

## 2024-05-21 ENCOUNTER — OFFICE VISIT (OUTPATIENT)
Dept: DERMATOLOGY | Facility: CLINIC | Age: 84
End: 2024-05-21
Payer: COMMERCIAL

## 2024-05-21 ENCOUNTER — TELEPHONE (OUTPATIENT)
Dept: DERMATOLOGY | Facility: CLINIC | Age: 84
End: 2024-05-21

## 2024-05-21 DIAGNOSIS — D23.9 DERMAL NEVUS: Primary | ICD-10-CM

## 2024-05-21 DIAGNOSIS — D18.01 ANGIOMA OF SKIN: ICD-10-CM

## 2024-05-21 DIAGNOSIS — L73.8 SENILE SEBACEOUS GLAND HYPERPLASIA: ICD-10-CM

## 2024-05-21 DIAGNOSIS — C44.212 BASAL CELL CARCINOMA (BCC) OF RIGHT EAR: Primary | ICD-10-CM

## 2024-05-21 DIAGNOSIS — C44.329 SQUAMOUS CELL CARCINOMA OF SKIN OF RIGHT TEMPLE: ICD-10-CM

## 2024-05-21 DIAGNOSIS — L81.4 LENTIGO: ICD-10-CM

## 2024-05-21 DIAGNOSIS — L82.1 SEBORRHEIC KERATOSES: ICD-10-CM

## 2024-05-21 DIAGNOSIS — Z85.828 HISTORY OF SKIN CANCER: ICD-10-CM

## 2024-05-21 PROCEDURE — 11102 TANGNTL BX SKIN SINGLE LES: CPT | Performed by: DERMATOLOGY

## 2024-05-21 PROCEDURE — 88331 PATH CONSLTJ SURG 1 BLK 1SPC: CPT | Performed by: DERMATOLOGY

## 2024-05-21 PROCEDURE — 99213 OFFICE O/P EST LOW 20 MIN: CPT | Mod: 25 | Performed by: DERMATOLOGY

## 2024-05-21 NOTE — PATIENT INSTRUCTIONS
Wound Care Instructions     FOR SUPERFICIAL WOUNDS     [facility] [phone]    [facility] [phone]                       AFTER 24 HOURS YOU SHOULD REMOVE THE BANDAGE AND BEGIN DAILY DRESSING CHANGES AS FOLLOWS:     1) Remove Dressing.     2) Clean and dry the area with tap water using a Q-tip or sterile gauze pad.     3) Apply Vaseline, Aquaphor, Polysporin ointment or Bacitracin ointment over entire wound.  Do NOT use Neosporin ointment.     4) Cover the wound with a band-aid, or a sterile non-stick gauze pad and micropore paper tape      REPEAT THESE INSTRUCTIONS AT LEAST ONCE A DAY UNTIL THE WOUND HAS COMPLETELY HEALED.    It is an old wives tale that a wound heals better when it is exposed to air and allowed to dry out. The wound will heal faster with a better cosmetic result if it is kept moist with ointment and covered with a bandage.    **Do not let the wound dry out.**      Supplies Needed:      *Cotton tipped applicators (Q-tips)    *Polysporin Ointment or Bacitracin Ointment (NOT NEOSPORIN)    *Band-aids or non-stick gauze pads and micropore paper tape.      PATIENT INFORMATION:    During the healing process you will notice a number of changes. All wounds develop a small halo of redness surrounding the wound.  This means healing is occurring. Severe itching with extensive redness usually indicates sensitivity to the ointment or bandage tape used to dress the wound.  You should call our office if this develops.      Swelling  and/or discoloration around your surgical site is common, particularly when performed around the eye.    All wounds normally drain.  The larger the wound the more drainage there will be.  After 7-10 days, you will notice the wound beginning to shrink and new skin will begin to grow.  The wound is healed when you can see skin has formed over the entire area.  A healed wound has a healthy, shiny look to the surface and is red to dark pink in color to  normalize.  Wounds may take approximately 4-6 weeks to heal.  Larger wounds may take 6-8 weeks.  After the wound is healed you may discontinue dressing changes.    You may experience a sensation of tightness as your wound heals. This is normal and will gradually subside.    Your healed wound may be sensitive to temperature changes. This sensitivity improves with time, but if you re having a lot of discomfort, try to avoid temperature extremes.    Patients frequently experience itching after their wound appears to have healed because of the continue healing under the skin.  Plain Vaseline will help relieve the itching.        POSSIBLE COMPLICATIONS    BLEEDING:    Leave the bandage in place.  Use tightly rolled up gauze or a cloth to apply direct pressure over the bandage for 30  minutes.  Reapply pressure for an additional 30 minutes if necessary  Use additional gauze and tape to maintain pressure once the bleeding has stopped.     [facility]  [address] [place] [address]  [phone]      May 21, 2024    Cecilio Navarro  [facility] [place] [address]      Dear Cecilio          Please check in at 2nd floor Dermatology Clinic.     Be sure to eat a good breakfast and bathe and wash your hair prior to Surgery. Please bring  with you if this is above your neck    If you are taking any anti-coagulants that are prescribed by your Doctor (such as Coumadin/warfarin, Plavix, Aspirin, Ibuprofen), please continue taking them.     However, If you are taking anti-coagulants over the counter without  a Doctor's order for a Medical condition, please discontinue them 10 days prior to Surgery.      Please wear loose comfortable clothing as it could possibly be 4-6 hours until your surgery is completed depending upon how many layers of tissue need to be removed.     Thank you,    Tejinder Jarvis MD

## 2024-05-21 NOTE — LETTER
5/21/2024         RE: Cecilio Navarro  [address] [facility] [id number]        Dear Colleague,    Thank you for referring your patient, Cecilio Navarro, to the [facility]. Please see a copy of my visit note below.    Cecilio Navarro is an extremely pleasant 83 year old year old male patient here today for spot on nose and temple.  .  Patient has no other skin complaints today.  Remainder of the HPI, Meds, PMH, Allergies, FH, and SH was reviewed in chart.      Past Medical History:   Diagnosis Date     Actinic keratoses      Basal cell carcinoma      Type II or unspecified type diabetes mellitus without mention of complication, not stated as uncontrolled      Unspecified disorder of male genital organs      Unspecified essential hypertension        Past Surgical History:   Procedure Laterality Date     COLONOSCOPY  10/16/2013    Diverticulosis in sigmoid colon; repeat in 5 years; Surgeon: Garry Ambrose MD;  Location: [facility]     HC REMOVE TONSILS/ADENOIDS,<11 Y/O          Family History   Problem Relation Age of Onset     Hypertension Mother      Cerebrovascular Disease Mother         mentally affected     Cancer - colorectal Father         mets to liver  age74     Gastrointestinal Disease Brother         multilple colon polyps     Hypertension Brother      Gastrointestinal Disease Sister         reflux     Obesity Son      Allergies Daughter      Arthritis Brother      Melanoma No family hx of        Social History     Socioeconomic History     Marital status:      Spouse name: Not on file     Number of children: Not on file     Years of education: Not on file     Highest education level: Not on file   Occupational History     Not on file   Tobacco Use     Smoking status: Never     Smokeless tobacco: Never   Vaping Use     Vaping status: Never Used   Substance and Sexual Activity     Alcohol use: Yes     Comment: occasional social     Drug use: No     Sexual  activity: Yes     Partners: Female   Other Topics Concern      Service No     Blood Transfusions No     Caffeine Concern Yes     Comment: 1-2 day     Occupational Exposure No     Hobby Hazards Yes     Comment: [place]     Sleep Concern No     Stress Concern No     Weight Concern No     Special Diet Yes     Comment: Diabetic and low fat     Back Care No     Exercise Yes     Bike Helmet Not Asked     Seat Belt Yes     Self-Exams Yes     Comment: check blood sugars     Parent/sibling w/ CABG, MI or angioplasty before 65F 55M? Not Asked   Social History Narrative     Not on file     Social Determinants of Health     Financial Resource Strain: Low Risk  (12/27/2023)    Financial Resource Strain      Within the past 12 months, have you or your family members you live with been unable to get utilities (heat, electricity) when it was really needed?: No   Food Insecurity: Low Risk  (12/27/2023)    Food Insecurity      Within the past 12 months, did you worry that your food would run out before you got money to buy more?: No      Within the past 12 months, did the food you bought just not last and you didn t have money to get more?: No   Transportation Needs: Low Risk  (12/27/2023)    Transportation Needs      Within the past 12 months, has lack of transportation kept you from medical appointments, getting your medicines, non-medical meetings or appointments, work, or from getting things that you need?: No   Physical Activity: Not on file   Stress: Not on file   Social Connections: Not on file   Interpersonal Safety: Not on file   Housing Stability: Low Risk  (12/27/2023)    Housing Stability      Do you have housing? : Yes      Are you worried about losing your housing?: No       Outpatient Encounter Medications as of 5/21/2024   Medication Sig Dispense Refill     aspirin 81 MG tablet Take 81 mg by mouth daily       atorvastatin (LIPITOR) 40 MG tablet Take 1 tablet (40 mg) by mouth daily 90 tablet 3     blood glucose  (NO BRAND SPECIFIED) test strip Use to test blood sugar 1 times daily.  Accu-chek guide me strips. 100 strip 3     blood glucose monitoring (SOFTCLIX) lancets Use to test blood sugar 1 times daily 100 each 5     ciclopirox (LOPROX) 0.77 % cream Apply topically at bedtime 90 g 6     doxycycline monohydrate (MONODOX) 100 MG capsule Take 1 capsule (100 mg) by mouth 2 times daily 60 capsule 6     glipiZIDE (GLUCOTROL XL) 10 MG 24 hr tablet Take 1 tablet (10 mg) by mouth daily 90 tablet 3     hydrochlorothiazide (HYDRODIURIL) 25 MG tablet Take 1 tablet (25 mg) by mouth daily 90 tablet 3     lisinopril (ZESTRIL) 40 MG tablet Take 1 tablet (40 mg) by mouth daily 90 tablet 3     metFORMIN (GLUCOPHAGE-XR) 750 MG 24 hr tablet Take 1 tablet (750 mg) by mouth 2 times daily (with meals) 180 tablet 3     metoprolol succinate ER (TOPROL XL) 50 MG 24 hr tablet Take 1 tablet (50 mg) by mouth daily 90 tablet 3     mometasone (ELOCON) 0.1 % external cream Apply topically daily To the back 60 g 6     Semaglutide (RYBELSUS) 7 MG tablet TAKE 1 TABLET BY MOUTH IN THE MORNING ON AN EMPTY STOMACH. NO OTHER MEDICATION/FOOD FOR 30 MINUTES. 90 tablet 3     sildenafil (VIAGRA) 100 MG cap/tab Take 1 tablet (100 mg) by mouth daily as needed for erectile dysfunction 3 tablet 11     tamsulosin (FLOMAX) 0.4 MG capsule Take 1 capsule (0.4 mg) by mouth daily 90 capsule 3     vardenafil (LEVITRA) 20 MG tablet Take 1 tablet (20 mg) by mouth daily as needed for erectile dysfunction 5 tablet 11     No facility-administered encounter medications on file as of 5/21/2024.             O:   NAD, WDWN, Alert & Oriented, Mood & Affect wnl, Vitals stable   General appearance normal   Vitals stable   Alert, oriented and in no acute distress     R temple 9mm pink pearly papule   Yellow papules on face   Stuck on papules and brown macules on face  Red papules on face  Flesh colored papules on face      Eyes: Conjunctivae/lids:Normal     ENT: Lips, mucosa:  normal    MSK:Normal    Cardiovascular: peripheral edema none    Pulm: Breathing Normal    Neuro/Psych: Orientation:Alert and Orientedx3 ; Mood/Affect:normal       MICRO:   R temple:Orthokeratosis of epidermis with a proliferation of nests of basaloid cells, with peripheral palisading and a haphazard arrangement in the center extending into the dermis, forming nodules.  The tumor cells have hyperchromatic nuclei. Poor cytoplasm and intercellular bridging.    A/P:  1. Seborrheic keratosis, lentigo, angioma, dermal nevus, hx of non-melanoma skin cancer, sebaceous hyperplasia,   2. R temple r/o bc   TANGENTIAL BIOPSY IN HOUSE:  After consent, anesthesia with LEC and prep, tangential excision performed and dx above confirmed with frozen section histology.  No complications and routine wound care.  Patient is not on  anticoagulants and risk of bleeding discussed with patient.       I have personally reviewed all specimens and/or slides and used them with my medical judgement to determine or confirm the final diagnosis.     Patient told result basal cell carcinoma schedule excision .      It was a pleasure speaking to Cecilio Navarro today.  Previous clinic notes and pertinent laboratory tests were reviewed prior to Cecilio Navarro's visit.  Signs and Symptoms of skin cancer discussed with patient.  Patient encouraged to perform monthly skin exams.  UV precautions reviewed with patient.  Return to clinic next appt      Again, thank you for allowing me to participate in the care of your patient.        Sincerely,        Tejinder Jarvis MD

## 2024-05-21 NOTE — TELEPHONE ENCOUNTER
Called patient and gave results. Patient scheduled in clinic today. Scheduled with Dr. Jarvis 6/11/24. Patient understood and has no questions.    Tamera Martinez MA

## 2024-05-21 NOTE — TELEPHONE ENCOUNTER
----- Message from Tejinder Jarvis MD sent at 5/21/2024 10:41 AM CDT -----  R temple basal cell carcinoma schedule excision

## 2024-05-21 NOTE — PROGRESS NOTES
Cecilio Navarro is an extremely pleasant 83 year old year old male patient here today for spot on nose and temple.  .  Patient has no other skin complaints today.  Remainder of the HPI, Meds, PMH, Allergies, FH, and SH was reviewed in chart.      Past Medical History:   Diagnosis Date    Actinic keratoses     Basal cell carcinoma     Type II or unspecified type diabetes mellitus without mention of complication, not stated as uncontrolled     Unspecified disorder of male genital organs     Unspecified essential hypertension        Past Surgical History:   Procedure Laterality Date    COLONOSCOPY  10/16/2013    Diverticulosis in sigmoid colon; repeat in 5 years; Surgeon: Garry Ambrose MD;  Location: [facility]    HC REMOVE TONSILS/ADENOIDS,<11 Y/O          Family History   Problem Relation Age of Onset    Hypertension Mother     Cerebrovascular Disease Mother         mentally affected    Cancer - colorectal Father         mets to liver  age76    Gastrointestinal Disease Brother         multilple colon polyps    Hypertension Brother     Gastrointestinal Disease Sister         reflux    Obesity Son     Allergies Daughter     Arthritis Brother     Melanoma No family hx of        Social History     Socioeconomic History    Marital status:      Spouse name: Not on file    Number of children: Not on file    Years of education: Not on file    Highest education level: Not on file   Occupational History    Not on file   Tobacco Use    Smoking status: Never    Smokeless tobacco: Never   Vaping Use    Vaping status: Never Used   Substance and Sexual Activity    Alcohol use: Yes     Comment: occasional social    Drug use: No    Sexual activity: Yes     Partners: Female   Other Topics Concern     Service No    Blood Transfusions No    Caffeine Concern Yes     Comment: 1-2 day    Occupational Exposure No    Hobby Hazards Yes     Comment: [place]    Sleep Concern No    Stress Concern No    Weight Concern No     Special Diet Yes     Comment: Diabetic and low fat    Back Care No    Exercise Yes    Bike Helmet Not Asked    Seat Belt Yes    Self-Exams Yes     Comment: check blood sugars    Parent/sibling w/ CABG, MI or angioplasty before 65F 55M? Not Asked   Social History Narrative    Not on file     Social Determinants of Health     Financial Resource Strain: Low Risk  (12/27/2023)    Financial Resource Strain     Within the past 12 months, have you or your family members you live with been unable to get utilities (heat, electricity) when it was really needed?: No   Food Insecurity: Low Risk  (12/27/2023)    Food Insecurity     Within the past 12 months, did you worry that your food would run out before you got money to buy more?: No     Within the past 12 months, did the food you bought just not last and you didn t have money to get more?: No   Transportation Needs: Low Risk  (12/27/2023)    Transportation Needs     Within the past 12 months, has lack of transportation kept you from medical appointments, getting your medicines, non-medical meetings or appointments, work, or from getting things that you need?: No   Physical Activity: Not on file   Stress: Not on file   Social Connections: Not on file   Interpersonal Safety: Not on file   Housing Stability: Low Risk  (12/27/2023)    Housing Stability     Do you have housing? : Yes     Are you worried about losing your housing?: No       Outpatient Encounter Medications as of 5/21/2024   Medication Sig Dispense Refill    aspirin 81 MG tablet Take 81 mg by mouth daily      atorvastatin (LIPITOR) 40 MG tablet Take 1 tablet (40 mg) by mouth daily 90 tablet 3    blood glucose (NO BRAND SPECIFIED) test strip Use to test blood sugar 1 times daily.  Accu-chek guide me strips. 100 strip 3    blood glucose monitoring (SOFTCLIX) lancets Use to test blood sugar 1 times daily 100 each 5    ciclopirox (LOPROX) 0.77 % cream Apply topically at bedtime 90 g 6    doxycycline monohydrate  (MONODOX) 100 MG capsule Take 1 capsule (100 mg) by mouth 2 times daily 60 capsule 6    glipiZIDE (GLUCOTROL XL) 10 MG 24 hr tablet Take 1 tablet (10 mg) by mouth daily 90 tablet 3    hydrochlorothiazide (HYDRODIURIL) 25 MG tablet Take 1 tablet (25 mg) by mouth daily 90 tablet 3    lisinopril (ZESTRIL) 40 MG tablet Take 1 tablet (40 mg) by mouth daily 90 tablet 3    metFORMIN (GLUCOPHAGE-XR) 750 MG 24 hr tablet Take 1 tablet (750 mg) by mouth 2 times daily (with meals) 180 tablet 3    metoprolol succinate ER (TOPROL XL) 50 MG 24 hr tablet Take 1 tablet (50 mg) by mouth daily 90 tablet 3    mometasone (ELOCON) 0.1 % external cream Apply topically daily To the back 60 g 6    Semaglutide (RYBELSUS) 7 MG tablet TAKE 1 TABLET BY MOUTH IN THE MORNING ON AN EMPTY STOMACH. NO OTHER MEDICATION/FOOD FOR 30 MINUTES. 90 tablet 3    sildenafil (VIAGRA) 100 MG cap/tab Take 1 tablet (100 mg) by mouth daily as needed for erectile dysfunction 3 tablet 11    tamsulosin (FLOMAX) 0.4 MG capsule Take 1 capsule (0.4 mg) by mouth daily 90 capsule 3    vardenafil (LEVITRA) 20 MG tablet Take 1 tablet (20 mg) by mouth daily as needed for erectile dysfunction 5 tablet 11     No facility-administered encounter medications on file as of 5/21/2024.             O:   NAD, WDWN, Alert & Oriented, Mood & Affect wnl, Vitals stable   General appearance normal   Vitals stable   Alert, oriented and in no acute distress     R temple 9mm pink pearly papule   Yellow papules on face   Stuck on papules and brown macules on face  Red papules on face  Flesh colored papules on face      Eyes: Conjunctivae/lids:Normal     ENT: Lips, mucosa: normal    MSK:Normal    Cardiovascular: peripheral edema none    Pulm: Breathing Normal    Neuro/Psych: Orientation:Alert and Orientedx3 ; Mood/Affect:normal       MICRO:   R temple:There is a proliferation of irregular nests of abnormal squamous cells arising from the epidermis and invading the dermis. These are well  differentiated. The dermis shows a variable superficial perivascular inflammatory infiltrate.  A/P:  1. Seborrheic keratosis, lentigo, angioma, dermal nevus, hx of non-melanoma skin cancer, sebaceous hyperplasia,   2. R temple r/o bc   TANGENTIAL BIOPSY IN HOUSE:  After consent, anesthesia with LEC and prep, tangential excision performed and dx above confirmed with frozen section histology.  No complications and routine wound care.  Patient is not on  anticoagulants and risk of bleeding discussed with patient.       I have personally reviewed all specimens and/or slides and used them with my medical judgement to determine or confirm the final diagnosis.     Patient told result squamous cell carcinoma schedule excision     It was a pleasure speaking to Cecilio Navarro today.  Previous clinic notes and pertinent laboratory tests were reviewed prior to Cecilio Navarro's visit.  Signs and Symptoms of skin cancer discussed with patient.  Patient encouraged to perform monthly skin exams.  UV precautions reviewed with patient.  Return to clinic next appt

## 2024-05-31 ENCOUNTER — TRANSFERRED RECORDS (OUTPATIENT)
Dept: MULTI SPECIALTY CLINIC | Facility: CLINIC | Age: 84
End: 2024-05-31

## 2024-05-31 LAB — RETINOPATHY: NORMAL

## 2024-06-02 NOTE — LETTER
9/10/2019         RE: Cecilio Navarro  [address] [facility] [address]        Dear Colleague,    Thank you for referring your patient, Cecilio Navarro, to the [facility]. Please see a copy of my visit note below.    Chief Complaint   Patient presents with     Ent Problem     History of Present Illness   Cecilio Navarro is a 79 year old male who presents to me today for ear evaluation.  The patient reports hearing loss in both ears worse on the right-hand side for many years.  He feels like this is been slightly worse over the last few years.  He has a lot of trouble in the car when he is the  in his right ear is towards the passenger.  He also has trouble in loud, noisy environments or crowded situations.  The patient denies any problems with otalgia, otorrhea, aural fullness, bloody otorrhea, dizziness, tinnitus, vertigo symptoms.  No previous ear surgery.  He does have a history of significant noise exposure.  [place] and did fine with an open Tractor.  He also has a history of firearm use it was a left-handed shooter for many years.  In his mid to late 20s he switched to a right-handed shooter.  Sometime around then he started wearing hearing protection while using heavy machinery and also with firearms.     An audiogram and tympanogram were performed on 12/12/2016 and personally reviewed.  The audiogram showed normal sloping to severe sensorineural hearing loss bilaterally significantly worse on the right.  Pure-tone average was 37 dB on the right and 20 dB on the left.  Speech reception threshhold was 35 dB on the right and 15 dB on the left.  The patient had 58% word recognition on the right and 96% word recognition on the left.  The tympanograms showed normal type A tympanograms with normal middle ear pressures bilaterally.  There was no sign of eustachian tube dysfunction or middle ear effusion.        Past Medical History  Patient Active Problem List    Diagnosis     HTN, goal below 140/90     Impotence of Organic Origin     Testosterone deficiency     HYPERLIPIDEMIA LDL GOAL <100     BPH with obstruction/lower urinary tract symptoms     Hydrocele of testis     Advanced directives, counseling/discussion     Morbid obesity (H)     Type 2 diabetes mellitus with diabetic nephropathy, without long-term current use of insulin (H)     Actinic keratoses     Current Medications     Current Outpatient Medications:      aspirin 81 MG tablet, Take 81 mg by mouth daily, Disp: , Rfl:      atorvastatin (LIPITOR) 40 MG tablet, Take 1 tablet (40 mg) by mouth daily, Disp: 90 tablet, Rfl: 3     glipiZIDE (GLUCOTROL XL) 10 MG 24 hr tablet, Take 1 tablet (10 mg) by mouth daily, Disp: 90 tablet, Rfl: 3     Ibuprofen (ADVIL) 200 MG capsule, Take 400 mg by mouth 2 times daily as needed., Disp: , Rfl:      lisinopril-hydrochlorothiazide (PRINZIDE/ZESTORETIC) 20-12.5 MG tablet, Take 1 tablet by mouth daily, Disp: 90 tablet, Rfl: 2     metFORMIN (GLUCOPHAGE) 1000 MG tablet, Take 1 tablet (1,000 mg) by mouth 2 times daily (with meals), Disp: 180 tablet, Rfl: 3     sildenafil (VIAGRA) 100 MG cap/tab, Take 1 tablet (100 mg) by mouth daily as needed for erectile dysfunction, Disp: 3 tablet, Rfl: 11     vardenafil (LEVITRA) 20 MG tablet, Take 1 tablet (20 mg) by mouth daily as needed for erectile dysfunction, Disp: 5 tablet, Rfl: 11    Allergies  Allergies   Allergen Reactions     Amoxicillin Itching and Rash       Social History   Social History     Socioeconomic History     Marital status:      Spouse name: Not on file     Number of children: Not on file     Years of education: Not on file     Highest education level: Not on file   Occupational History     Not on file   Social Needs     Financial resource strain: Not on file     Food insecurity:     Worry: Not on file     Inability: Not on file     Transportation needs:     Medical: Not on file     Non-medical: Not on file   Tobacco  Use     Smoking status: Never Smoker     Smokeless tobacco: Never Used   Substance and Sexual Activity     Alcohol use: Yes     Comment: occasional social     Drug use: No     Sexual activity: Yes     Partners: Female   Lifestyle     Physical activity:     Days per week: Not on file     Minutes per session: Not on file     Stress: Not on file   Relationships     Social connections:     Talks on phone: Not on file     Gets together: Not on file     Attends Baptism service: Not on file     Active member of club or organization: Not on file     Attends meetings of clubs or organizations: Not on file     Relationship status: Not on file     Intimate partner violence:     Fear of current or ex partner: Not on file     Emotionally abused: Not on file     Physically abused: Not on file     Forced sexual activity: Not on file   Other Topics Concern      Service No     Blood Transfusions No     Caffeine Concern Yes     Comment: 1-2 day     Occupational Exposure No     Hobby Hazards Yes     Comment: Hunt     Sleep Concern No     Stress Concern No     Weight Concern No     Special Diet Yes     Comment: Diabetic and low fat     Back Care No     Exercise Yes     Bike Helmet Not Asked     Seat Belt Yes     Self-Exams Yes     Comment: check blood sugars     Parent/sibling w/ CABG, MI or angioplasty before 65F 55M? Not Asked   Social History Narrative     Not on file       Family History  Family History   Problem Relation Age of Onset     Hypertension Mother      Cerebrovascular Disease Mother         mentally affected     Cancer - colorectal Father         mets to liver  age76     Gastrointestinal Disease Brother         multilple colon polyps     Hypertension Brother      Gastrointestinal Disease Sister         reflux     Obesity Son      Allergies Daughter      Arthritis Brother      Melanoma No family hx of        Review of Systems  As per HPI and PMHx, otherwise 10+ comprehensive system review is  "negative.    Physical Exam  BP (!) 160/94 (BP Location: Left arm, Patient Position: Sitting, Cuff Size: Adult Regular)   Pulse 74   Temp 98.1  F (36.7  C) (Oral)   Ht 1.702 m (5' 7\")   Wt 96.6 kg (213 lb)   BMI 33.36 kg/m     GENERAL: Patient is a pleasant, cooperative 79 year old male in no acute distress.  HEAD: Normocephalic, atraumatic.  Hair and scalp are normal.  EYES: Pupils are equal, round, reactive to light and accommodation.  Extraocular movements are intact.  The sclera nonicteric without injection.  The extraocular structures are normal.  EARS: Normal shape and symmetry.  No tenderness when palpating the mastoid or tragal areas bilaterally.  Otoscopic exam reveals a mild amount of cerumen bilaterally.  The bilateral tympanic membranes are round, intact without evidence of effusion, good landmarks.  No retraction, granulation, or drainage.  NOSE: Nares are patent.  Nasal mucosa is pink and moist.  Negative anterior rhinoscopy.  NEUROLOGIC: Cranial nerves II through XII are grossly intact.  Voice is strong.  Patient is House-Brackman I/VI bilaterally.  CARDIOVASCULAR: Extremities are warm and well-perfused.  No significant peripheral edema.  RESPIRATORY: Patient has nonlabored breathing without cough, wheeze, stridor.  PSYCHIATRIC: Patient is alert and oriented.  Mood and affect appear normal.  SKIN: Warm and dry.  No scalp, face, or neck lesions noted.    Audiogram  An audiogram and tympanogram were performed today and personally reviewed.  The audiogram showed normal sloping to moderate sloping to severe sensorineural hearing loss in the right ear to mild sloping to severe sensorineural hearing loss in the left ear..  Pure-tone average is 39 dB on the right and 29 dB on the left.  Speech reception threshhold is 20 dB on the right and 10 dB on the left.  The patient had 72% word recognition on the right and 88% word recognition on the left.  The sensorineural hearing was age-appropriate, the patient " had some asymmetry from 1000 3000 Hz.  There was no evidence of conductive hearing loss.  The tympanograms show normal type A tympanograms with normal middle ear pressures bilaterally.  There is no sign of eustachian tube dysfunction or middle ear effusion.      Assessment and Plan     ICD-10-CM    1. Sensorineural hearing loss, bilateral H90.3 AUDIOLOGY ADULT REFERRAL   2. Asymmetrical right sensorineural hearing loss H90.41 AUDIOLOGY ADULT REFERRAL     It was my pleasure seeing Cecilio Navarro today in clinic.  The patient presents today with hearing loss.  The patient has had some nausea exposure over his lifetime and was a left-handed shooter for many years which likely explains the right asymmetric hearing loss.  I did discuss the patient that per the AAO guidelines, we do discuss MRI for asymmetry of greater than 10 dB across 3 frequencies.  After some discussion and the risks and benefits of imaging were discussed with the patient, he would like to defer imaging at this time.  From a clinical standpoint, there is no evidence of serious CNS disorders or other complicating factors that could be causing his hearing loss or asymmetry.  We spent the remainder of today's visit on education. We discussed hearing protection in noisy environments.    The patient will follow up as necessary for worsening symptoms or changes in symptoms. I have also recommended yearly audiograms, and consideration of a hearing aid evaluation.    Cecilio to follow up with Primary Care provider regarding elevated blood pressure.    Rj Ytaes MD  Department of Otolarygology-Head and Neck Surgery  [facility]      Again, thank you for allowing me to participate in the care of your patient.        Sincerely,        Rj Yates MD     hypercapnia

## 2024-06-03 ENCOUNTER — VIRTUAL VISIT (OUTPATIENT)
Dept: EDUCATION SERVICES | Facility: CLINIC | Age: 84
End: 2024-06-03
Payer: COMMERCIAL

## 2024-06-03 DIAGNOSIS — E11.21 TYPE 2 DIABETES MELLITUS WITH DIABETIC NEPHROPATHY, WITHOUT LONG-TERM CURRENT USE OF INSULIN (H): Primary | ICD-10-CM

## 2024-06-03 PROCEDURE — G0108 DIAB MANAGE TRN  PER INDIV: HCPCS | Mod: 93 | Performed by: DIETITIAN, REGISTERED

## 2024-06-03 NOTE — PROGRESS NOTES
Diabetes Self-Management Education & Support    Presents for: Individual review    Type of Service: Telephone Visit    Originating Location (Patient Location): Home  Distant Location (Provider Location): [facility]  Mode of Communication:  Telephone    Telephone Visit Start Time:  11:04 am  Telephone Visit End Time (telephone visit stop time): 11:56 am    How would patient like to obtain AVS? He doesn't need them      ASSESSMENT:  -eye exam recently  -he is doing lawn care and walking dog  -doing well, will make a PCP appointment soon, due for labs again.  Patient's most recent   Lab Results   Component Value Date    A1C 6.9 12/27/2023    A1C 7.0 05/10/2021     is meeting goal of <7.0    Diabetes knowledge and skills assessment:   Patient is knowledgeable in diabetes management concepts related to: Healthy Eating, Being Active, and Monitoring    Continue education with the following diabetes management concepts: Healthy Eating, Being Active, Monitoring, Taking Medication, Problem Solving, Reducing Risks, and Healthy Coping    Based on learning assessment above, most appropriate setting for further diabetes education would be: Individual setting.      PLAN  Doing well, no concerns.        Follow-up: in three months    See Care Plan for co-developed, patient-state behavior change goals.  AVS provided for patient today.    Education Materials Provided:  No new materials provided today      SUBJECTIVE/OBJECTIVE:  Presents for: Individual review  Accompanied by: Self  Diabetes education in the past 24mo: Yes  Diabetes type: Type 2  Disease course: Stable  How confident are you filling out medical forms by yourself:: Extremely  Cultural Influences/Ethnic Background:  Not  or       Diabetes Symptoms & Complications:  Weight trend: Stable  Symptom course: Stable  Disease course: Stable       Patient Problem List and Family Medical History reviewed for relevant medical history,  "current medical status, and diabetes risk factors.    Vitals:  There were no vitals taken for this visit.  Estimated body mass index is 29.89 kg/m  as calculated from the following:    Height as of 12/27/23: 1.689 m (5' 6.5\").    Weight as of 12/27/23: 85.3 kg (188 lb).   Last 3 BP:   BP Readings from Last 3 Encounters:   12/27/23 126/82   05/22/23 104/64   03/01/23 (!) 151/95       History   Smoking Status    Never   Smokeless Tobacco    Never       Labs:  Lab Results   Component Value Date    A1C 6.9 12/27/2023    A1C 7.0 05/10/2021     Lab Results   Component Value Date     12/27/2023     09/22/2021     11/07/2019     Lab Results   Component Value Date    LDL 56 12/27/2023    LDL 35 09/16/2020     HDL Cholesterol   Date Value Ref Range Status   09/16/2020 43 >39 mg/dL Final     Direct Measure HDL   Date Value Ref Range Status   12/27/2023 52 >=40 mg/dL Final   ]  GFR Estimate   Date Value Ref Range Status   12/27/2023 81 >60 mL/min/1.73m2 Final   11/07/2019 82 >60 mL/min/[1.73_m2] Final     Comment:     Non  GFR Calc  Starting 12/18/2018, serum creatinine based estimated GFR (eGFR) will be   calculated using the Chronic Kidney Disease Epidemiology Collaboration   (CKD-EPI) equation.       GFR Estimate If Black   Date Value Ref Range Status   11/07/2019 >90 >60 mL/min/[1.73_m2] Final     Comment:      GFR Calc  Starting 12/18/2018, serum creatinine based estimated GFR (eGFR) will be   calculated using the Chronic Kidney Disease Epidemiology Collaboration   (CKD-EPI) equation.       Lab Results   Component Value Date    CR 0.94 12/27/2023    CR 0.90 11/07/2019     No results found for: \"MICROALBUMIN\"    Healthy Eating:  Healthy Eating Assessed Today: Yes  Cultural/Church diet restrictions?: No  How many times a week on average do you eat food made away from home (restaurant/take-out)?: 2  Meals include: Breakfast, Lunch, Dinner  Breakfast: eggs or " cereal  Lunch: broccoli cheese and chicken noodle soup  Dinner: salads, chicken.  frozen chow mein, lobster rolls, pork chops, steak split it  Snacks: small candy bar occasionally  Other: walks dog and mow lawns.  119, 124.  tests occasionally  Beverages: Coffee, Milk, Diet soda    Being Active:  Being Active Assessed Today: Yes (walks the dog, cuts lawn)  Barrier to exercise: None    Monitoring:  Blood Glucose Meter: Reli-On  Times checking blood sugar at home (number): 1  Times checking blood sugar at home (per): Month    See above.    Taking Medications:  Diabetes Medication(s)       Biguanides       metFORMIN (GLUCOPHAGE-XR) 750 MG 24 hr tablet Take 1 tablet (750 mg) by mouth 2 times daily (with meals)       Sulfonylureas       glipiZIDE (GLUCOTROL XL) 10 MG 24 hr tablet Take 1 tablet (10 mg) by mouth daily       Incretin Mimetic Agents       Semaglutide (RYBELSUS) 7 MG tablet TAKE 1 TABLET BY MOUTH IN THE MORNING ON AN EMPTY STOMACH. NO OTHER MEDICATION/FOOD FOR 30 MINUTES.            Current Treatments: Diet, Oral Medication (taken by mouth)                     Reducing Risks:  Has dilated eye exam at least once a year?: Yes  Sees dentist every 6 months?: Yes    Healthy Coping:  Informal Support system:: Family, Spouse  Patient Activation Measure Survey Score:      11/15/2010    10:00 AM   REMINGTON Score (Last Two)   REMINGTON Raw Score 52   Activation Score 100   REMINGTON Level 4         Care Plan and Education Provided:  There are no care plans that you recently modified to display for this patient.          Time Spent: 50 minutes  Encounter Type: Individual    Any diabetes medication dose changes were made via the CDE Protocol per the patient's primary care provider. A copy of this encounter was shared with the provider.

## 2024-06-11 ENCOUNTER — OFFICE VISIT (OUTPATIENT)
Dept: DERMATOLOGY | Facility: CLINIC | Age: 84
End: 2024-06-11
Payer: COMMERCIAL

## 2024-06-11 DIAGNOSIS — C44.329 SQUAMOUS CELL CARCINOMA OF SKIN OF RIGHT TEMPLE: Primary | ICD-10-CM

## 2024-06-11 PROCEDURE — 17312 MOHS ADDL STAGE: CPT | Performed by: DERMATOLOGY

## 2024-06-11 PROCEDURE — 17311 MOHS 1 STAGE H/N/HF/G: CPT | Performed by: DERMATOLOGY

## 2024-06-11 PROCEDURE — 13132 CMPLX RPR F/C/C/M/N/AX/G/H/F: CPT | Performed by: DERMATOLOGY

## 2024-06-11 NOTE — PATIENT INSTRUCTIONS
Sutured Wound Care     [facility]: [phone]    [facility]: [phone]          No strenuous activity for 48 hours. Resume moderate activity in 48 hours. No heavy exercising until you are seen for follow up in one week.     Take Tylenol as needed for discomfort.                         Do not drink alcoholic beverages for 48 hours.     Keep the pressure bandage in place for 24 hours. (White Guaze) If the bandage becomes blood tinged or loose, reinforce it with gauze and tape.        (Refer to the reverse side of this page for management of bleeding).    Remove pressure bandage in 24 hours (White Guaze)     Leave the flat bandage (Brown Tape) in place until your follow up appointment     Keep the bandage dry. Wash around it carefully.    If the tape becomes soiled or starts to come off, reinforce it with additional paper tape.    Do not smoke for 3 weeks; smoking is detrimental to wound healing.    It is normal to have swelling and bruising around the surgical site. The bruising will fade in approximately 10-14 days. Elevate the area to reduce swelling.    Numbness, itchiness and sensitivity to temperature changes can occur after surgery and may take up to 18 months to normalize.      POSSIBLE COMPLICATIONS    BLEEDING:    Leave the bandage in place.  Use tightly rolled up gauze or a cloth to apply direct pressure over the bandage for 20   minutes.  Reapply pressure for an additional 20 minutes if necessary  Call the office or go to the nearest emergency room if pressure fails to stop the bleeding.  Use additional gauze and tape to maintain pressure once the bleeding has stopped.        PAIN:    Post operative pain should slowly get better, never worse.  A severe increase in pain may indicate a problem. Call the office if this occurs.    In case of emergency phone:Dr Jarvis [phone]

## 2024-06-11 NOTE — LETTER
6/11/2024      Cecilio Navarro  [address] [facility] [id number]      Dear Colleague,    Thank you for referring your patient, Cecilio Navarro, to the [facility]. Please see a copy of my visit note below.    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  Patient has no other skin complaints today.  Remainder of the HPI, Meds, PMH, Allergies, FH, and SH was reviewed in chart.      Past Medical History:   Diagnosis Date     Actinic keratoses      Basal cell carcinoma      Type II or unspecified type diabetes mellitus without mention of complication, not stated as uncontrolled      Unspecified disorder of male genital organs      Unspecified essential hypertension        Past Surgical History:   Procedure Laterality Date     COLONOSCOPY  10/16/2013    Diverticulosis in sigmoid colon; repeat in 5 years; Surgeon: Garry Ambrose MD;  Location: [facility] REMOVE TONSILS/ADENOIDS,<13 Y/O          Family History   Problem Relation Age of Onset     Hypertension Mother      Cerebrovascular Disease Mother         mentally affected     Cancer - colorectal Father         mets to liver  age76     Gastrointestinal Disease Brother         multilple colon polyps     Hypertension Brother      Gastrointestinal Disease Sister         reflux     Obesity Son      Allergies Daughter      Arthritis Brother      Melanoma No family hx of        Social History     Socioeconomic History     Marital status:      Spouse name: Not on file     Number of children: Not on file     Years of education: Not on file     Highest education level: Not on file   Occupational History     Not on file   Tobacco Use     Smoking status: Never     Smokeless tobacco: Never   Vaping Use     Vaping status:  Never Used   Substance and Sexual Activity     Alcohol use: Yes     Comment: occasional social     Drug use: No     Sexual activity: Yes     Partners: Female   Other Topics Concern      Service No     Blood Transfusions No     Caffeine Concern Yes     Comment: 1-2 day     Occupational Exposure No     Hobby Hazards Yes     Comment: [place]     Sleep Concern No     Stress Concern No     Weight Concern No     Special Diet Yes     Comment: Diabetic and low fat     Back Care No     Exercise Yes     Bike Helmet Not Asked     Seat Belt Yes     Self-Exams Yes     Comment: check blood sugars     Parent/sibling w/ CABG, MI or angioplasty before 65F 55M? Not Asked   Social History Narrative     Not on file     Social Determinants of Health     Financial Resource Strain: Low Risk  (12/27/2023)    Financial Resource Strain      Within the past 12 months, have you or your family members you live with been unable to get utilities (heat, electricity) when it was really needed?: No   Food Insecurity: Low Risk  (12/27/2023)    Food Insecurity      Within the past 12 months, did you worry that your food would run out before you got money to buy more?: No      Within the past 12 months, did the food you bought just not last and you didn t have money to get more?: No   Transportation Needs: Low Risk  (12/27/2023)    Transportation Needs      Within the past 12 months, has lack of transportation kept you from medical appointments, getting your medicines, non-medical meetings or appointments, work, or from getting things that you need?: No   Physical Activity: Not on file   Stress: Not on file   Social Connections: Not on file   Interpersonal Safety: Not on file   Housing Stability: Low Risk  (12/27/2023)    Housing Stability      Do you have housing? : Yes      Are you worried about losing your housing?: No       Outpatient Encounter Medications as of 6/11/2024   Medication Sig Dispense Refill     aspirin 81 MG tablet Take 81 mg by  mouth daily       atorvastatin (LIPITOR) 40 MG tablet Take 1 tablet (40 mg) by mouth daily 90 tablet 3     blood glucose (NO BRAND SPECIFIED) test strip Use to test blood sugar 1 times daily.  Accu-chek guide me strips. 100 strip 3     blood glucose monitoring (SOFTCLIX) lancets Use to test blood sugar 1 times daily 100 each 5     ciclopirox (LOPROX) 0.77 % cream Apply topically at bedtime 90 g 6     doxycycline monohydrate (MONODOX) 100 MG capsule Take 1 capsule (100 mg) by mouth 2 times daily 60 capsule 6     glipiZIDE (GLUCOTROL XL) 10 MG 24 hr tablet Take 1 tablet (10 mg) by mouth daily 90 tablet 3     hydrochlorothiazide (HYDRODIURIL) 25 MG tablet Take 1 tablet (25 mg) by mouth daily 90 tablet 3     lisinopril (ZESTRIL) 40 MG tablet Take 1 tablet (40 mg) by mouth daily 90 tablet 3     metFORMIN (GLUCOPHAGE-XR) 750 MG 24 hr tablet Take 1 tablet (750 mg) by mouth 2 times daily (with meals) 180 tablet 3     metoprolol succinate ER (TOPROL XL) 50 MG 24 hr tablet Take 1 tablet (50 mg) by mouth daily 90 tablet 3     mometasone (ELOCON) 0.1 % external cream Apply topically daily To the back 60 g 6     Semaglutide (RYBELSUS) 7 MG tablet TAKE 1 TABLET BY MOUTH IN THE MORNING ON AN EMPTY STOMACH. NO OTHER MEDICATION/FOOD FOR 30 MINUTES. 90 tablet 3     sildenafil (VIAGRA) 100 MG cap/tab Take 1 tablet (100 mg) by mouth daily as needed for erectile dysfunction 3 tablet 11     tamsulosin (FLOMAX) 0.4 MG capsule Take 1 capsule (0.4 mg) by mouth daily 90 capsule 3     vardenafil (LEVITRA) 20 MG tablet Take 1 tablet (20 mg) by mouth daily as needed for erectile dysfunction 5 tablet 11     No facility-administered encounter medications on file as of 6/11/2024.             O:   NAD, WDWN, Alert & Oriented, Mood & Affect wnl, Vitals stable   General appearance normal   Vitals stable   Alert, oriented and in no acute distress     R temple 9mm scaly papule       Eyes: Conjunctivae/lids:Normal     ENT: Lips, mucosa:  normal    MSK:Normal    Cardiovascular: peripheral edema none    Pulm: Breathing Normal    Lymph Nodes: No Head and Neck Lymphadenopathy     Neuro/Psych: Orientation:Alert and Orientedx3 ; Mood/Affect:normal       A/P:  R temple squamous cell carcinoma   MOHS:   Location    The rationale for Mohs surgery was discussed with the patient and consent was obtained.  The risks and benefits as well as alternatives to therapy were discussed, in detail.  Specifically, the risks of infection, scarring, bleeding, prolonged wound healing, incomplete removal, allergy to anesthesia, nerve injury and recurrence were addressed.  Indication for Mohs was Location. Prior to the procedure, the treatment site was clearly identified and, if available, confirmed with previous photos and confirmed by the patient   All components of the Universal Protocol/PAUSE rule were completed.  The Mohs surgeon operated in two distinct and integrated capacities as the surgeon and pathologist.      The area was prepped with Betasept.  A rim of normal appearing skin was marked circumferentially around the lesion.  The area was infiltrated with local anesthesia.  The tumor was first debulked to remove all clinically apparent tumor.  An incision following the standard Mohs approach was done and the specimen was oriented,mapped and placed in 2 block(s).  Each specimen was then chromacoded and processed in the Mohs laboratory using standard Mohs technique and submitted for frozen section histology.  Frozen section analysis showed  residual tumor but CLEAR MARGINS.    1st stage:There is a proliferation of irregular nests of abnormal squamous cells arising from the epidermis and invading the dermis. These are well differentiated. The dermis shows a variable superficial perivascular inflammatory infiltrate.     The tumor was excised using standard Mohs technique in 2 stages(s).  CLEAR MARGINS OBTAINED and Final defect size was 1.6 x 1.8 cm.     We discussed the  options for wound management in full with the patient including risks/benefits/ possible outcomes.    REPAIR COMPLEX: Because of the tightness of the surrounding skin and Because of the size and full thickness nature of the defect, Because of the tightness of the surrounding skin, To maintain form and function, In order to avoid distortion, and Because of the proximity to the lid, a complex closure was planned. After LE anesthesia and prep, Burow's triangles were excised in the relaxed skin tension lines. The wound edges were widely undermined greater than width of the defect on both sides by dissection in the subcutaneous plane until adequate tissue mobility was obtained. Hemostasis was obtained. The wound edges were closed in a layered fashion using Vicryl and Fast Absorbing Plain Gut sutures. Postoperative length was 4 cm.   EBL minimal; complications none; wound care routine.  The patient was discharged in good condition and will return in one week for wound evaluation.    It was a pleasure speaking to Cecilio Navarro today.  This is an *** issue with *** systemic symptoms and *** exacerbation of disease.     Previous clinic notes and pertinent laboratory tests were reviewed prior to Cecilio Navarro's visit.  Signs and Symptoms of skin cancer discussed with patient.  Patient encouraged to perform monthly skin exams.  UV precautions reviewed with patient.  Risks of non-melanoma skin cancer discussed with patient   Return to clinic 6 months        Again, thank you for allowing me to participate in the care of your patient.        Sincerely,        Tejinder Jarvis MD

## 2024-06-11 NOTE — PROGRESS NOTES
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  Patient has no other skin complaints today.  Remainder of the HPI, Meds, PMH, Allergies, FH, and SH was reviewed in chart.      Past Medical History:   Diagnosis Date    Actinic keratoses     Basal cell carcinoma     Type II or unspecified type diabetes mellitus without mention of complication, not stated as uncontrolled     Unspecified disorder of male genital organs     Unspecified essential hypertension        Past Surgical History:   Procedure Laterality Date    COLONOSCOPY  10/16/2013    Diverticulosis in sigmoid colon; repeat in 5 years; Surgeon: Garry Ambrose MD;  Location: [facility]    [facility] REMOVE TONSILS/ADENOIDS,<11 Y/O          Family History   Problem Relation Age of Onset    Hypertension Mother     Cerebrovascular Disease Mother         mentally affected    Cancer - colorectal Father         mets to liver  age74    Gastrointestinal Disease Brother         multilple colon polyps    Hypertension Brother     Gastrointestinal Disease Sister         reflux    Obesity Son     Allergies Daughter     Arthritis Brother     Melanoma No family hx of        Social History     Socioeconomic History    Marital status:      Spouse name: Not on file    Number of children: Not on file    Years of education: Not on file    Highest education level: Not on file   Occupational History    Not on file   Tobacco Use    Smoking status: Never    Smokeless tobacco: Never   Vaping Use    Vaping status: Never Used   Substance and Sexual Activity    Alcohol use: Yes     Comment: occasional social    Drug use: No    Sexual activity: Yes     Partners: Female   Other Topics Concern     Service No    Blood Transfusions No    Caffeine Concern Yes     Comment: 1-2 day    Occupational Exposure No    Hobby Hazards Yes     Comment: [place]    Sleep Concern No    Stress  Concern No    Weight Concern No    Special Diet Yes     Comment: Diabetic and low fat    Back Care No    Exercise Yes    Bike Helmet Not Asked    Seat Belt Yes    Self-Exams Yes     Comment: check blood sugars    Parent/sibling w/ CABG, MI or angioplasty before 65F 55M? Not Asked   Social History Narrative    Not on file     Social Determinants of Health     Financial Resource Strain: Low Risk  (12/27/2023)    Financial Resource Strain     Within the past 12 months, have you or your family members you live with been unable to get utilities (heat, electricity) when it was really needed?: No   Food Insecurity: Low Risk  (12/27/2023)    Food Insecurity     Within the past 12 months, did you worry that your food would run out before you got money to buy more?: No     Within the past 12 months, did the food you bought just not last and you didn t have money to get more?: No   Transportation Needs: Low Risk  (12/27/2023)    Transportation Needs     Within the past 12 months, has lack of transportation kept you from medical appointments, getting your medicines, non-medical meetings or appointments, work, or from getting things that you need?: No   Physical Activity: Not on file   Stress: Not on file   Social Connections: Not on file   Interpersonal Safety: Not on file   Housing Stability: Low Risk  (12/27/2023)    Housing Stability     Do you have housing? : Yes     Are you worried about losing your housing?: No       Outpatient Encounter Medications as of 6/11/2024   Medication Sig Dispense Refill    aspirin 81 MG tablet Take 81 mg by mouth daily      atorvastatin (LIPITOR) 40 MG tablet Take 1 tablet (40 mg) by mouth daily 90 tablet 3    blood glucose (NO BRAND SPECIFIED) test strip Use to test blood sugar 1 times daily.  Accu-chek guide me strips. 100 strip 3    blood glucose monitoring (SOFTCLIX) lancets Use to test blood sugar 1 times daily 100 each 5    ciclopirox (LOPROX) 0.77 % cream Apply topically at bedtime 90 g  6    doxycycline monohydrate (MONODOX) 100 MG capsule Take 1 capsule (100 mg) by mouth 2 times daily 60 capsule 6    glipiZIDE (GLUCOTROL XL) 10 MG 24 hr tablet Take 1 tablet (10 mg) by mouth daily 90 tablet 3    hydrochlorothiazide (HYDRODIURIL) 25 MG tablet Take 1 tablet (25 mg) by mouth daily 90 tablet 3    lisinopril (ZESTRIL) 40 MG tablet Take 1 tablet (40 mg) by mouth daily 90 tablet 3    metFORMIN (GLUCOPHAGE-XR) 750 MG 24 hr tablet Take 1 tablet (750 mg) by mouth 2 times daily (with meals) 180 tablet 3    metoprolol succinate ER (TOPROL XL) 50 MG 24 hr tablet Take 1 tablet (50 mg) by mouth daily 90 tablet 3    mometasone (ELOCON) 0.1 % external cream Apply topically daily To the back 60 g 6    Semaglutide (RYBELSUS) 7 MG tablet TAKE 1 TABLET BY MOUTH IN THE MORNING ON AN EMPTY STOMACH. NO OTHER MEDICATION/FOOD FOR 30 MINUTES. 90 tablet 3    sildenafil (VIAGRA) 100 MG cap/tab Take 1 tablet (100 mg) by mouth daily as needed for erectile dysfunction 3 tablet 11    tamsulosin (FLOMAX) 0.4 MG capsule Take 1 capsule (0.4 mg) by mouth daily 90 capsule 3    vardenafil (LEVITRA) 20 MG tablet Take 1 tablet (20 mg) by mouth daily as needed for erectile dysfunction 5 tablet 11     No facility-administered encounter medications on file as of 6/11/2024.             O:   NAD, WDWN, Alert & Oriented, Mood & Affect wnl, Vitals stable   General appearance normal   Vitals stable   Alert, oriented and in no acute distress     R temple 9mm scaly papule       Eyes: Conjunctivae/lids:Normal     ENT: Lips, mucosa: normal    MSK:Normal    Cardiovascular: peripheral edema none    Pulm: Breathing Normal    Lymph Nodes: No Head and Neck Lymphadenopathy     Neuro/Psych: Orientation:Alert and Orientedx3 ; Mood/Affect:normal       A/P:  R temple squamous cell carcinoma   MOHS:   Location    The rationale for Mohs surgery was discussed with the patient and consent was obtained.  The risks and benefits as well as alternatives to therapy  were discussed, in detail.  Specifically, the risks of infection, scarring, bleeding, prolonged wound healing, incomplete removal, allergy to anesthesia, nerve injury and recurrence were addressed.  Indication for Mohs was Location. Prior to the procedure, the treatment site was clearly identified and, if available, confirmed with previous photos and confirmed by the patient   All components of the Universal Protocol/PAUSE rule were completed.  The Mohs surgeon operated in two distinct and integrated capacities as the surgeon and pathologist.      The area was prepped with Betasept.  A rim of normal appearing skin was marked circumferentially around the lesion.  The area was infiltrated with local anesthesia.  The tumor was first debulked to remove all clinically apparent tumor.  An incision following the standard Mohs approach was done and the specimen was oriented,mapped and placed in 2 block(s).  Each specimen was then chromacoded and processed in the Mohs laboratory using standard Mohs technique and submitted for frozen section histology.  Frozen section analysis showed  residual tumor but CLEAR MARGINS.    1st stage:There is a proliferation of irregular nests of abnormal squamous cells arising from the epidermis and invading the dermis. These are well differentiated. The dermis shows a variable superficial perivascular inflammatory infiltrate.     The tumor was excised using standard Mohs technique in 2 stages(s).  CLEAR MARGINS OBTAINED and Final defect size was 1.6 x 1.8 cm.     We discussed the options for wound management in full with the patient including risks/benefits/ possible outcomes.    REPAIR COMPLEX: Because of the tightness of the surrounding skin and Because of the size and full thickness nature of the defect, Because of the tightness of the surrounding skin, To maintain form and function, In order to avoid distortion, and Because of the proximity to the lid, a complex closure was planned. After  LE anesthesia and prep, Burow's triangles were excised in the relaxed skin tension lines. The wound edges were widely undermined greater than width of the defect on both sides by dissection in the subcutaneous plane until adequate tissue mobility was obtained. Hemostasis was obtained. The wound edges were closed in a layered fashion using Vicryl and Fast Absorbing Plain Gut sutures. Postoperative length was 4 cm.   EBL minimal; complications none; wound care routine.  The patient was discharged in good condition and will return in one week for wound evaluation.    It was a pleasure speaking to Cecilio Navarro today.  Previous clinic notes and pertinent laboratory tests were reviewed prior to Cecilio Navarro's visit.  Signs and Symptoms of skin cancer discussed with patient.  Patient encouraged to perform monthly skin exams.  UV precautions reviewed with patient.  Risks of non-melanoma skin cancer discussed with patient   Return to clinic 6 months

## 2024-06-18 ENCOUNTER — ALLIED HEALTH/NURSE VISIT (OUTPATIENT)
Dept: DERMATOLOGY | Facility: CLINIC | Age: 84
End: 2024-06-18
Payer: COMMERCIAL

## 2024-06-18 DIAGNOSIS — Z48.01 ENCOUNTER FOR CHANGE OR REMOVAL OF SURGICAL WOUND DRESSING: Primary | ICD-10-CM

## 2024-06-18 PROCEDURE — 99207 PR NO CHARGE NURSE ONLY: CPT

## 2024-06-18 NOTE — PATIENT INSTRUCTIONS
WOUND CARE INSTRUCTIONS  for  ONE WEEK AFTER SURGERY          Leave flat bandage on your skin for one week after today s bandage change.  In one week when you remove the bandage, you may resume your regular skin care routine, including washing with mild soap and water, applying moisturizer, make-up and sunscreen.    If there are any open or bleeding areas at the incision/graft site you should begin to cover the area with a bandage daily as follows:    Clean and dry the area with plain tap water using a Q-tip or sterile gauze pad.  Apply Polysporin or Bacitracin ointment to the open area.  Cover the wound with a band-aid or a sterile non-stick gauze pad and micropore paper tape.         SIGNS OF INFECTION  - If you notice any of these signs of infection, call your doctor right away: expanding redness around the wound.  - Yellow or greenish-colored pus or cloudy wound drainage.    - Red streaking spreading from the wound.  - Increased swelling, tenderness, or pain around the wound.   - Fever.    Please remember that yellow and clear drainage from a wound can be normal and related to normal wound healing.  Isolated drainage from a wound without a combination of the above features does not indicate infection.       *Once the bandages are removed, the scar will be red and firm (especially in the lip/chin area). This is normal and will fade in time. It might take 6-12 months for this to happen.     *Massaging the area will help the scar soften and fade quicker. Begin to massage the area one month after the bandages have been removed. To massage apply pressure directly and firmly over the scar with the fingertips and move in a circular motion. Massage the area for a few minutes several times a day. Continue to massage the site for several months.    *Approximately 6-8 weeks after surgery it is not uncommon to see the formation of  tender pimple-like  bump along the scar. This is normal. As the scar continues to mature and  the stitches underneath the skin begin to dissolve, this might occur. Do not pick or squeeze, this will resolve on it s own. Should one break open producing a small amount of drainage, apply Polysporin or Bacitracin ointment a few times a day until the wound is completely healed.    *Numbness in the surgical area is expected. It might take 12-18 months for the feeling to return to normal. During this time sensations of itchiness, tingling and occasional sharp pains might be noted. These feelings are normal and will subside once the nerves have completely healed.         IN CASE OF EMERGENCY: Dr Jarvis [phone]     If you were seen in [place] call: [phone]    If you were seen in [place] call: [phone]

## 2024-06-18 NOTE — PROGRESS NOTES
Cecilio Navarro comes into clinic today at the request of Dr. Jarvis Ordering Provider for Wound Check Action taken: See Below.    This service provided today was under the supervising provider of the day Dr. Jarvis, who was available if needed.    Pt returned to clinic for post surgery 1 week follow up bandage change. Pt has no complaints, denies pain. Bandage removed from Right Temple, area cleansed with normal saline. Site is healing and wound edges approximating well. Reapplied new steri strips and paper tape.    Advised to watch for signs/sx of infection; spreading redness, drainage, odor, fever. Call or report promptly to clinic. Pt given written instructions and informed to rtc as needed. Patient verbalized understanding.     Jyoti SAUCEDO,  CMA

## 2024-08-01 ENCOUNTER — TELEPHONE (OUTPATIENT)
Dept: FAMILY MEDICINE | Facility: CLINIC | Age: 84
End: 2024-08-01
Payer: COMMERCIAL

## 2024-08-01 NOTE — TELEPHONE ENCOUNTER
A refill request has been made to the [employer] assistance program for Rybelsus.    A 120 day supply should arrive at [facility] within 10 business days. Please contact Cecilio to .    Thanks so much for your help!    Gabbi Maynard  Prescription Assistance Supervisor  Pharmacy Assistance

## 2024-08-08 NOTE — TELEPHONE ENCOUNTER
Sivan with [employer] calling stating Rx has shipped and should arrive at office today. Tracking [id number].    Confirmed Rx received. Patient notified.     Enrollment end date 12/31/24, patient should re-enroll on or after 10/15.    Pio Cross, [facility]

## 2024-08-08 NOTE — TELEPHONE ENCOUNTER
Med received and ready for pick, packing slip in alexandrea's paperwork.     Patient notified of recommendations below about renewing med.       Juliane NORIGEA  Station

## 2024-08-20 ENCOUNTER — OFFICE VISIT (OUTPATIENT)
Dept: DERMATOLOGY | Facility: CLINIC | Age: 84
End: 2024-08-20
Payer: COMMERCIAL

## 2024-08-20 DIAGNOSIS — Z85.828 HISTORY OF SKIN CANCER: ICD-10-CM

## 2024-08-20 DIAGNOSIS — L73.9 FOLLICULITIS: ICD-10-CM

## 2024-08-20 DIAGNOSIS — L82.1 SEBORRHEIC KERATOSES: ICD-10-CM

## 2024-08-20 DIAGNOSIS — C44.229 SQUAMOUS CELL CARCINOMA OF ANTIHELIX OF LEFT EAR: ICD-10-CM

## 2024-08-20 DIAGNOSIS — L81.4 LENTIGO: ICD-10-CM

## 2024-08-20 DIAGNOSIS — D23.9 DERMAL NEVUS: Primary | ICD-10-CM

## 2024-08-20 DIAGNOSIS — D18.01 ANGIOMA OF SKIN: ICD-10-CM

## 2024-08-20 PROCEDURE — 69100 BIOPSY OF EXTERNAL EAR: CPT | Performed by: DERMATOLOGY

## 2024-08-20 PROCEDURE — 99213 OFFICE O/P EST LOW 20 MIN: CPT | Mod: 25 | Performed by: DERMATOLOGY

## 2024-08-20 PROCEDURE — 88331 PATH CONSLTJ SURG 1 BLK 1SPC: CPT | Performed by: DERMATOLOGY

## 2024-08-20 ASSESSMENT — PAIN SCALES - GENERAL: PAINLEVEL: NO PAIN (0)

## 2024-08-20 NOTE — NURSING NOTE
Cecilio Navarro's chief complaint for this visit includes:  Chief Complaint   Patient presents with    Derm Problem     Patient is here to follow-up on low back rash. Patient is taking 1 doxycycline daily and doesn't feel like it is helping.    Patient has a new non-healing spot on left ear.     PCP: Damion Morales    Referring Provider:  Tejinder Jarvis MD  [address] [place] [address]    There were no vitals taken for this visit.  No Pain (0)        Allergies   Allergen Reactions    Amoxicillin Itching and Rash         Do you need any medication refills at today's visit? No    Viri Duvall MA

## 2024-08-20 NOTE — PATIENT INSTRUCTIONS
Wound Care Instructions     FOR SUPERFICIAL WOUNDS     [facility] [phone]    [facility] [phone]      Left Ear                 AFTER 24 HOURS YOU SHOULD REMOVE THE BANDAGE AND BEGIN DAILY DRESSING CHANGES AS FOLLOWS:     1) Remove Dressing.     2) Clean and dry the area with tap water using a Q-tip or sterile gauze pad.     3) Apply Vaseline, Aquaphor, Polysporin ointment or Bacitracin ointment over entire wound.  Do NOT use Neosporin ointment.     4) Cover the wound with a band-aid, or a sterile non-stick gauze pad and micropore paper tape      REPEAT THESE INSTRUCTIONS AT LEAST ONCE A DAY UNTIL THE WOUND HAS COMPLETELY HEALED.    It is an old wives tale that a wound heals better when it is exposed to air and allowed to dry out. The wound will heal faster with a better cosmetic result if it is kept moist with ointment and covered with a bandage.    **Do not let the wound dry out.**      Supplies Needed:      *Cotton tipped applicators (Q-tips)    *Polysporin Ointment or Bacitracin Ointment (NOT NEOSPORIN)    *Band-aids or non-stick gauze pads and micropore paper tape.      PATIENT INFORMATION:    During the healing process you will notice a number of changes. All wounds develop a small halo of redness surrounding the wound.  This means healing is occurring. Severe itching with extensive redness usually indicates sensitivity to the ointment or bandage tape used to dress the wound.  You should call our office if this develops.      Swelling  and/or discoloration around your surgical site is common, particularly when performed around the eye.    All wounds normally drain.  The larger the wound the more drainage there will be.  After 7-10 days, you will notice the wound beginning to shrink and new skin will begin to grow.  The wound is healed when you can see skin has formed over the entire area.  A healed wound has a healthy, shiny look to the surface and is red to dark pink  in color to normalize.  Wounds may take approximately 4-6 weeks to heal.  Larger wounds may take 6-8 weeks.  After the wound is healed you may discontinue dressing changes.    You may experience a sensation of tightness as your wound heals. This is normal and will gradually subside.    Your healed wound may be sensitive to temperature changes. This sensitivity improves with time, but if you re having a lot of discomfort, try to avoid temperature extremes.    Patients frequently experience itching after their wound appears to have healed because of the continue healing under the skin.  Plain Vaseline will help relieve the itching.        POSSIBLE COMPLICATIONS    BLEEDING:    Leave the bandage in place.  Use tightly rolled up gauze or a cloth to apply direct pressure over the bandage for 30  minutes.  Reapply pressure for an additional 30 minutes if necessary  Use additional gauze and tape to maintain pressure once the bleeding has stopped.

## 2024-08-20 NOTE — LETTER
8/20/2024      Cecilio Navarro  [address] [place] [id number]      Dear Colleague,    Thank you for referring your patient, Cecilio Navarro, to the [facility]. Please see a copy of my visit note below.    Cecilio Navarro is an extremely pleasant 84 year old year old male patient here today for follow up hx of non-melanoma skin cancer and folliculitis.  Mostly clear on doxy once daily, not using topicals.  He notes spot on left ear.  Patient has no other skin complaints today.  Remainder of the HPI, Meds, PMH, Allergies, FH, and SH was reviewed in chart.      Past Medical History:   Diagnosis Date     Actinic keratoses      Basal cell carcinoma      Type II or unspecified type diabetes mellitus without mention of complication, not stated as uncontrolled      Unspecified disorder of male genital organs      Unspecified essential hypertension        Past Surgical History:   Procedure Laterality Date     COLONOSCOPY  10/16/2013    Diverticulosis in sigmoid colon; repeat in 5 years; Surgeon: Garry Ambrose MD;  Location: [facility]     HC REMOVE TONSILS/ADENOIDS,<11 Y/O          Family History   Problem Relation Age of Onset     Hypertension Mother      Cerebrovascular Disease Mother         mentally affected     Cancer - colorectal Father         mets to liver  age76     Gastrointestinal Disease Brother         multilple colon polyps     Hypertension Brother      Gastrointestinal Disease Sister         reflux     Obesity Son      Allergies Daughter      Arthritis Brother      Melanoma No family hx of        Social History     Socioeconomic History     Marital status: Not on file     Spouse name: Not on file     Number of children: Not on file     Years of education: Not on file     Highest education level: Not on file   Occupational History     Not on file   Tobacco Use     Smoking status: Never     Smokeless tobacco: Never   Vaping Use     Vaping status: Never Used   Substance and  Sexual Activity     Alcohol use: Yes     Comment: occasional social     Drug use: No     Sexual activity: Yes     Partners: Female   Other Topics Concern      Service No     Blood Transfusions No     Caffeine Concern Yes     Comment: 1-2 day     Occupational Exposure No     Hobby Hazards Yes     Comment: [place]     Sleep Concern No     Stress Concern No     Weight Concern No     Special Diet Yes     Comment: Diabetic and low fat     Back Care No     Exercise Yes     Bike Helmet Not Asked     Seat Belt Yes     Self-Exams Yes     Comment: check blood sugars     Parent/sibling w/ CABG, MI or angioplasty before 65F 55M? Not Asked   Social History Narrative     Not on file     Social Determinants of Health     Financial Resource Strain: Low Risk  (12/27/2023)    Financial Resource Strain      Within the past 12 months, have you or your family members you live with been unable to get utilities (heat, electricity) when it was really needed?: No   Food Insecurity: Low Risk  (12/27/2023)    Food Insecurity      Within the past 12 months, did you worry that your food would run out before you got money to buy more?: No      Within the past 12 months, did the food you bought just not last and you didn t have money to get more?: No   Transportation Needs: Low Risk  (12/27/2023)    Transportation Needs      Within the past 12 months, has lack of transportation kept you from medical appointments, getting your medicines, non-medical meetings or appointments, work, or from getting things that you need?: No   Physical Activity: Not on file   Stress: Not on file   Social Connections: Not on file   Interpersonal Safety: Not on file   Housing Stability: Low Risk  (12/27/2023)    Housing Stability      Do you have housing? : Yes      Are you worried about losing your housing?: No       Outpatient Encounter Medications as of 8/20/2024   Medication Sig Dispense Refill     aspirin 81 MG tablet Take 81 mg by mouth daily        atorvastatin (LIPITOR) 40 MG tablet Take 1 tablet (40 mg) by mouth daily 90 tablet 3     blood glucose (NO BRAND SPECIFIED) test strip Use to test blood sugar 1 times daily.  Accu-chek guide me strips. 100 strip 3     blood glucose monitoring (SOFTCLIX) lancets Use to test blood sugar 1 times daily 100 each 5     ciclopirox (LOPROX) 0.77 % cream Apply topically at bedtime 90 g 6     doxycycline monohydrate (MONODOX) 100 MG capsule Take 1 capsule (100 mg) by mouth 2 times daily 60 capsule 6     glipiZIDE (GLUCOTROL XL) 10 MG 24 hr tablet Take 1 tablet (10 mg) by mouth daily 90 tablet 3     hydrochlorothiazide (HYDRODIURIL) 25 MG tablet Take 1 tablet (25 mg) by mouth daily 90 tablet 3     lisinopril (ZESTRIL) 40 MG tablet Take 1 tablet (40 mg) by mouth daily 90 tablet 3     metFORMIN (GLUCOPHAGE-XR) 750 MG 24 hr tablet Take 1 tablet (750 mg) by mouth 2 times daily (with meals) 180 tablet 3     metoprolol succinate ER (TOPROL XL) 50 MG 24 hr tablet Take 1 tablet (50 mg) by mouth daily 90 tablet 3     mometasone (ELOCON) 0.1 % external cream Apply topically daily To the back 60 g 6     Semaglutide (RYBELSUS) 7 MG tablet TAKE 1 TABLET BY MOUTH IN THE MORNING ON AN EMPTY STOMACH. NO OTHER MEDICATION/FOOD FOR 30 MINUTES. 90 tablet 3     sildenafil (VIAGRA) 100 MG cap/tab Take 1 tablet (100 mg) by mouth daily as needed for erectile dysfunction 3 tablet 11     tamsulosin (FLOMAX) 0.4 MG capsule Take 1 capsule (0.4 mg) by mouth daily 90 capsule 3     vardenafil (LEVITRA) 20 MG tablet Take 1 tablet (20 mg) by mouth daily as needed for erectile dysfunction 5 tablet 11     No facility-administered encounter medications on file as of 8/20/2024.             O:   NAD, WDWN, Alert & Oriented, Mood & Affect wnl, Vitals stable   General appearance normal   Vitals stable   Alert, oriented and in no acute distress     L antihelix 5mm crusted scaly papule   Post inflammatory changes on lower back   Upper back rare inflammatory papules    R temple well healed   Stuck on papules and brown macules on trunk and ext   Red papules on trunk  Flesh colored papules on trunk         Eyes: Conjunctivae/lids:Normal     ENT: Lips, mucosa: normal    MSK:Normal    Cardiovascular: peripheral edema none    Pulm: Breathing Normal    Neuro/Psych: Orientation:Alert and Orientedx3 ; Mood/Affect:normal       MICRO:   L antihelix:There is a proliferation of irregular nests of abnormal squamous cells arising from the epidermis and invading the dermis. These are well differentiated. The dermis shows a variable superficial perivascular inflammatory infiltrate.   A/P:  1. Seborrheic keratosis, lentigo, angioma, dermal nevus, hx of non-melanoma skin cancer   2. L antihelix r/o squamous cell carcinoma   TANGENTIAL BIOPSY IN HOUSE:  After consent, anesthesia with LEC and prep, tangential excision performed and dx above confirmed with frozen section histology.  No complications and routine wound care.  Patient is not on  anticoagulants and risk of bleeding discussed with patient.       I have personally reviewed all specimens and/or slides and used them with my medical judgement to determine or confirm the final diagnosis.     Patient told result squamous cell carcinoma schedule excision .      3. Folliculitis   Cont doxy  Add benzaclin twice daily  Return to clinic 3 months    It was a pleasure speaking to Cecilio Navarro today.  Previous clinic notes and pertinent laboratory tests were reviewed prior to Cecilio Navarro's visit.  Patient encouraged to perform monthly skin exams.  UV precautions reviewed with patient.  Risks of non-melanoma skin cancer discussed with patient   Return to clinic next appt      Again, thank you for allowing me to participate in the care of your patient.        Sincerely,        Tejinder Jarvis MD

## 2024-08-20 NOTE — PROGRESS NOTES
Cecilio Navarro is an extremely pleasant 84 year old year old male patient here today for follow up hx of non-melanoma skin cancer and folliculitis.  Mostly clear on doxy once daily, not using topicals.  He notes spot on left ear.  Patient has no other skin complaints today.  Remainder of the HPI, Meds, PMH, Allergies, FH, and SH was reviewed in chart.      Past Medical History:   Diagnosis Date    Actinic keratoses     Basal cell carcinoma     Type II or unspecified type diabetes mellitus without mention of complication, not stated as uncontrolled     Unspecified disorder of male genital organs     Unspecified essential hypertension        Past Surgical History:   Procedure Laterality Date    COLONOSCOPY  10/16/2013    Diverticulosis in sigmoid colon; repeat in 5 years; Surgeon: Garry Ambrose MD;  Location: [facility]    HC REMOVE TONSILS/ADENOIDS,<11 Y/O          Family History   Problem Relation Age of Onset    Hypertension Mother     Cerebrovascular Disease Mother         mentally affected    Cancer - colorectal Father         mets to liver  age74    Gastrointestinal Disease Brother         multilple colon polyps    Hypertension Brother     Gastrointestinal Disease Sister         reflux    Obesity Son     Allergies Daughter     Arthritis Brother     Melanoma No family hx of        Social History     Socioeconomic History    Marital status: Not on file     Spouse name: Not on file    Number of children: Not on file    Years of education: Not on file    Highest education level: Not on file   Occupational History    Not on file   Tobacco Use    Smoking status: Never    Smokeless tobacco: Never   Vaping Use    Vaping status: Never Used   Substance and Sexual Activity    Alcohol use: Yes     Comment: occasional social    Drug use: No    Sexual activity: Yes     Partners: Female   Other Topics Concern     Service No    Blood Transfusions No    Caffeine Concern Yes     Comment: 1-2 day    Occupational  Exposure No    Hobby Hazards Yes     Comment: [place]    Sleep Concern No    Stress Concern No    Weight Concern No    Special Diet Yes     Comment: Diabetic and low fat    Back Care No    Exercise Yes    Bike Helmet Not Asked    Seat Belt Yes    Self-Exams Yes     Comment: check blood sugars    Parent/sibling w/ CABG, MI or angioplasty before 65F 55M? Not Asked   Social History Narrative    Not on file     Social Determinants of Health     Financial Resource Strain: Low Risk  (12/27/2023)    Financial Resource Strain     Within the past 12 months, have you or your family members you live with been unable to get utilities (heat, electricity) when it was really needed?: No   Food Insecurity: Low Risk  (12/27/2023)    Food Insecurity     Within the past 12 months, did you worry that your food would run out before you got money to buy more?: No     Within the past 12 months, did the food you bought just not last and you didn t have money to get more?: No   Transportation Needs: Low Risk  (12/27/2023)    Transportation Needs     Within the past 12 months, has lack of transportation kept you from medical appointments, getting your medicines, non-medical meetings or appointments, work, or from getting things that you need?: No   Physical Activity: Not on file   Stress: Not on file   Social Connections: Not on file   Interpersonal Safety: Not on file   Housing Stability: Low Risk  (12/27/2023)    Housing Stability     Do you have housing? : Yes     Are you worried about losing your housing?: No       Outpatient Encounter Medications as of 8/20/2024   Medication Sig Dispense Refill    aspirin 81 MG tablet Take 81 mg by mouth daily      atorvastatin (LIPITOR) 40 MG tablet Take 1 tablet (40 mg) by mouth daily 90 tablet 3    blood glucose (NO BRAND SPECIFIED) test strip Use to test blood sugar 1 times daily.  Accu-chek guide me strips. 100 strip 3    blood glucose monitoring (SOFTCLIX) lancets Use to test blood sugar 1 times  daily 100 each 5    ciclopirox (LOPROX) 0.77 % cream Apply topically at bedtime 90 g 6    doxycycline monohydrate (MONODOX) 100 MG capsule Take 1 capsule (100 mg) by mouth 2 times daily 60 capsule 6    glipiZIDE (GLUCOTROL XL) 10 MG 24 hr tablet Take 1 tablet (10 mg) by mouth daily 90 tablet 3    hydrochlorothiazide (HYDRODIURIL) 25 MG tablet Take 1 tablet (25 mg) by mouth daily 90 tablet 3    lisinopril (ZESTRIL) 40 MG tablet Take 1 tablet (40 mg) by mouth daily 90 tablet 3    metFORMIN (GLUCOPHAGE-XR) 750 MG 24 hr tablet Take 1 tablet (750 mg) by mouth 2 times daily (with meals) 180 tablet 3    metoprolol succinate ER (TOPROL XL) 50 MG 24 hr tablet Take 1 tablet (50 mg) by mouth daily 90 tablet 3    mometasone (ELOCON) 0.1 % external cream Apply topically daily To the back 60 g 6    Semaglutide (RYBELSUS) 7 MG tablet TAKE 1 TABLET BY MOUTH IN THE MORNING ON AN EMPTY STOMACH. NO OTHER MEDICATION/FOOD FOR 30 MINUTES. 90 tablet 3    sildenafil (VIAGRA) 100 MG cap/tab Take 1 tablet (100 mg) by mouth daily as needed for erectile dysfunction 3 tablet 11    tamsulosin (FLOMAX) 0.4 MG capsule Take 1 capsule (0.4 mg) by mouth daily 90 capsule 3    vardenafil (LEVITRA) 20 MG tablet Take 1 tablet (20 mg) by mouth daily as needed for erectile dysfunction 5 tablet 11     No facility-administered encounter medications on file as of 8/20/2024.             O:   NAD, WDWN, Alert & Oriented, Mood & Affect wnl, Vitals stable   General appearance normal   Vitals stable   Alert, oriented and in no acute distress     L antihelix 5mm crusted scaly papule   Post inflammatory changes on lower back   Upper back rare inflammatory papules   R temple well healed   Stuck on papules and brown macules on trunk and ext   Red papules on trunk  Flesh colored papules on trunk         Eyes: Conjunctivae/lids:Normal     ENT: Lips, mucosa: normal    MSK:Normal    Cardiovascular: peripheral edema none    Pulm: Breathing Normal    Neuro/Psych:  Orientation:Alert and Orientedx3 ; Mood/Affect:normal       MICRO:   L antihelix:There is a proliferation of irregular nests of abnormal squamous cells arising from the epidermis and invading the dermis. These are well differentiated. The dermis shows a variable superficial perivascular inflammatory infiltrate.   A/P:  1. Seborrheic keratosis, lentigo, angioma, dermal nevus, hx of non-melanoma skin cancer   2. L antihelix r/o squamous cell carcinoma   TANGENTIAL BIOPSY IN HOUSE:  After consent, anesthesia with LEC and prep, tangential excision performed and dx above confirmed with frozen section histology.  No complications and routine wound care.  Patient is not on  anticoagulants and risk of bleeding discussed with patient.       I have personally reviewed all specimens and/or slides and used them with my medical judgement to determine or confirm the final diagnosis.     Patient told result squamous cell carcinoma schedule excision .      3. Folliculitis   Cont doxy  Add benzaclin twice daily  Return to clinic 3 months    It was a pleasure speaking to Cecilio Navarro today.  Previous clinic notes and pertinent laboratory tests were reviewed prior to Cecilio Navarro's visit.  Patient encouraged to perform monthly skin exams.  UV precautions reviewed with patient.  Risks of non-melanoma skin cancer discussed with patient   Return to clinic next appt

## 2024-08-21 NOTE — PROGRESS NOTES
Surgical Office Location :   [facility]  [address] [place] [address]

## 2024-08-25 ENCOUNTER — HEALTH MAINTENANCE LETTER (OUTPATIENT)
Age: 84
End: 2024-08-25

## 2024-08-27 ENCOUNTER — OFFICE VISIT (OUTPATIENT)
Dept: DERMATOLOGY | Facility: CLINIC | Age: 84
End: 2024-08-27
Payer: COMMERCIAL

## 2024-08-27 DIAGNOSIS — C44.229 SQUAMOUS CELL CARCINOMA OF ANTIHELIX OF LEFT EAR: ICD-10-CM

## 2024-08-27 PROCEDURE — 17311 MOHS 1 STAGE H/N/HF/G: CPT | Performed by: DERMATOLOGY

## 2024-08-27 NOTE — PROGRESS NOTES
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  .  Patient has no other skin complaints today.  Remainder of the HPI, Meds, PMH, Allergies, FH, and SH was reviewed in chart.      Past Medical History:   Diagnosis Date    Actinic keratoses     Basal cell carcinoma     Squamous cell carcinoma of skin, unspecified     Type II or unspecified type diabetes mellitus without mention of complication, not stated as uncontrolled     Unspecified disorder of male genital organs     Unspecified essential hypertension        Past Surgical History:   Procedure Laterality Date    COLONOSCOPY  10/16/2013    Diverticulosis in sigmoid colon; repeat in 5 years; Surgeon: Garry Ambrose MD;  Location: [facility]    HC REMOVE TONSILS/ADENOIDS,<13 Y/O          Family History   Problem Relation Age of Onset    Hypertension Mother     Cerebrovascular Disease Mother         mentally affected    Cancer - colorectal Father         mets to liver  age76    Gastrointestinal Disease Brother         multilple colon polyps    Hypertension Brother     Gastrointestinal Disease Sister         reflux    Obesity Son     Allergies Daughter     Arthritis Brother     Melanoma No family hx of        Social History     Socioeconomic History    Marital status:      Spouse name: Not on file    Number of children: Not on file    Years of education: Not on file    Highest education level: Not on file   Occupational History    Not on file   Tobacco Use    Smoking status: Never    Smokeless tobacco: Never   Vaping Use    Vaping status: Never Used   Substance and Sexual Activity    Alcohol use: Yes     Comment: occasional social    Drug use: No    Sexual activity: Yes     Partners: Female   Other Topics Concern     Service No    Blood Transfusions No    Caffeine Concern Yes     Comment: 1-2 day    Occupational Exposure No    Hobby Hazards Yes      Comment: [employer]    Sleep Concern No    Stress Concern No    Weight Concern No    Special Diet Yes     Comment: Diabetic and low fat    Back Care No    Exercise Yes    Bike Helmet Not Asked    Seat Belt Yes    Self-Exams Yes     Comment: check blood sugars    Parent/sibling w/ CABG, MI or angioplasty before 65F 55M? Not Asked   Social History Narrative    Not on file     Social Determinants of Health     Financial Resource Strain: Low Risk  (12/27/2023)    Financial Resource Strain     Within the past 12 months, have you or your family members you live with been unable to get utilities (heat, electricity) when it was really needed?: No   Food Insecurity: Low Risk  (12/27/2023)    Food Insecurity     Within the past 12 months, did you worry that your food would run out before you got money to buy more?: No     Within the past 12 months, did the food you bought just not last and you didn t have money to get more?: No   Transportation Needs: Low Risk  (12/27/2023)    Transportation Needs     Within the past 12 months, has lack of transportation kept you from medical appointments, getting your medicines, non-medical meetings or appointments, work, or from getting things that you need?: No   Physical Activity: Not on file   Stress: Not on file   Social Connections: Not on file   Interpersonal Safety: Not on file   Housing Stability: Low Risk  (12/27/2023)    Housing Stability     Do you have housing? : Yes     Are you worried about losing your housing?: No       Outpatient Encounter Medications as of 8/27/2024   Medication Sig Dispense Refill    aspirin 81 MG tablet Take 81 mg by mouth daily      atorvastatin (LIPITOR) 40 MG tablet Take 1 tablet (40 mg) by mouth daily 90 tablet 3    blood glucose (NO BRAND SPECIFIED) test strip Use to test blood sugar 1 times daily.  Accu-chek guide me strips. 100 strip 3    blood glucose monitoring (SOFTCLIX) lancets Use to test blood sugar 1 times daily 100 each 5    ciclopirox  (LOPROX) 0.77 % cream Apply topically at bedtime 90 g 6    doxycycline monohydrate (MONODOX) 100 MG capsule Take 1 capsule (100 mg) by mouth 2 times daily 60 capsule 6    glipiZIDE (GLUCOTROL XL) 10 MG 24 hr tablet Take 1 tablet (10 mg) by mouth daily 90 tablet 3    hydrochlorothiazide (HYDRODIURIL) 25 MG tablet Take 1 tablet (25 mg) by mouth daily 90 tablet 3    lisinopril (ZESTRIL) 40 MG tablet Take 1 tablet (40 mg) by mouth daily 90 tablet 3    metFORMIN (GLUCOPHAGE-XR) 750 MG 24 hr tablet Take 1 tablet (750 mg) by mouth 2 times daily (with meals) 180 tablet 3    metoprolol succinate ER (TOPROL XL) 50 MG 24 hr tablet Take 1 tablet (50 mg) by mouth daily 90 tablet 3    mometasone (ELOCON) 0.1 % external cream Apply topically daily To the back 60 g 6    Semaglutide (RYBELSUS) 7 MG tablet TAKE 1 TABLET BY MOUTH IN THE MORNING ON AN EMPTY STOMACH. NO OTHER MEDICATION/FOOD FOR 30 MINUTES. 90 tablet 3    sildenafil (VIAGRA) 100 MG cap/tab Take 1 tablet (100 mg) by mouth daily as needed for erectile dysfunction 3 tablet 11    tamsulosin (FLOMAX) 0.4 MG capsule Take 1 capsule (0.4 mg) by mouth daily 90 capsule 3    vardenafil (LEVITRA) 20 MG tablet Take 1 tablet (20 mg) by mouth daily as needed for erectile dysfunction 5 tablet 11     No facility-administered encounter medications on file as of 8/27/2024.             O:   NAD, WDWN, Alert & Oriented, Mood & Affect wnl, Vitals stable   General appearance normal   Vitals stable   Alert, oriented and in no acute distress      Following lymph nodes palpated: Occipital, Cervical, Supraclavicular no lad  L antihelix 5mm scaly papule       Eyes: Conjunctivae/lids:Normal     ENT: Lips, mucosa: normal    MSK:Normal    Cardiovascular: peripheral edema none    Pulm: Breathing Normal    Lymph Nodes: No Head and Neck Lymphadenopathy     Neuro/Psych: Orientation:Alert and Orientedx3 ; Mood/Affect:normal       A/P:  L antihelix squamous cell carcinoma   MOHS:   Location    The  rationale for Mohs surgery was discussed with the patient and consent was obtained.  The risks and benefits as well as alternatives to therapy were discussed, in detail.  Specifically, the risks of infection, scarring, bleeding, prolonged wound healing, incomplete removal, allergy to anesthesia, nerve injury and recurrence were addressed.  Indication for Mohs was Location. Prior to the procedure, the treatment site was clearly identified and, if available, confirmed with previous photos and confirmed by the patient   All components of the Universal Protocol/PAUSE rule were completed.  The Mohs surgeon operated in two distinct and integrated capacities as the surgeon and pathologist.      The area was prepped with Betasept.  A rim of normal appearing skin was marked circumferentially around the lesion.  The area was infiltrated with local anesthesia.  The tumor was first debulked to remove all clinically apparent tumor.  An incision following the standard Mohs approach was done and the specimen was oriented,mapped and placed in 1 block(s).  Each specimen was then chromacoded and processed in the Mohs laboratory using standard Mohs technique and submitted for frozen section histology.  Frozen section analysis showed  residual tumor but CLEAR MARGINS.    1st stage:There is a proliferation of irregular nests of abnormal squamous cells arising from the epidermis and invading the dermis. These are well differentiated. The dermis shows a variable superficial perivascular inflammatory infiltrate.     The tumor was excised using standard Mohs technique in 1 stages(s).  CLEAR MARGINS OBTAINED and Final defect size was 1 cm.     We discussed the options for wound management in full with the patient including risks/benefits/ possible outcomes.      REPAIR SECOND INTENT: We discussed the options for wound management in full with the patient including risks/benefits/possible outcomes. Decision made to allow the wound to heal by  second intention. Cautery was used for for hemostasis. EBL minimal; complications none; wound care routine.  The patient was discharged in good condition and will return in one month or prn for wound evaluation.  It was a pleasure speaking to Cecilio Navarro today.  Previous clinic notes and pertinent laboratory tests were reviewed prior to Cecilio Navarro's visit.  Signs and Symptoms of skin cancer discussed with patient.  Patient encouraged to perform monthly skin exams.  UV precautions reviewed with patient.  Risks of non-melanoma skin cancer discussed with patient   Return to clinic 6 months

## 2024-08-27 NOTE — PATIENT INSTRUCTIONS
Open Wound Care     for Left Ear        No strenuous activity for 48 hours    Take Tylenol as needed for discomfort.                                                .         Do not drink alcoholic beverages for 48 hours.    Keep the pressure bandage in place for 24 hours. If the bandage becomes blood tinged or loose, reinforce it with gauze and tape.        (Refer to the reverse side of this page for management of bleeding).    Remove bandage in 24 hours and begin wound care as follows:     Clean area with tap water using a Q tip or gauze pad, (shower / bathe normally)  Dry wound with Q tip or gauze pad  Apply Aquaphor, Vaseline, Polysporin or Bacitracin Ointment with a Q tip  Do NOT use Neosporin Ointment *  Cover the wound with a band-aid or nonstick gauze pad and paper tape.  Repeat wound care once a day until wound is completely healed.    It is an old wives tale that a wound heals better when it is exposed to air and allowed to dry out. The wound will heal faster with a better cosmetic result if it is kept moist with ointment and covered with a bandage.  Do not let the wound dry out.      Supplies Needed:                Qtips or gauze pads                Polysporin or Bacitracin Ointment                Bandaids or nonstick gauze pads and paper tape    Wound care kits and brown paper tape are available for purchase at   the pharmacy.    BLEEDING:    Use tightly rolled up gauze or cloth to apply direct pressure over the bandage for 20   minutes.  Reapply pressure for an additional 20 minutes if necessary  Call the office or go to the nearest emergency room if pressure fails to stop the bleeding.  Use additional gauze and tape to maintain pressure once the bleeding has stopped.  Begin wound care 24 hours after surgery as directed.                  WOUND HEALING    One week after surgery a pink / red halo will form around the outside of the wound.   This is new skin.  The center of the wound will appear yellowish  white and produce some drainage.  The pink halo will slowly migrate in toward the center of the wound until the wound is covered with new shiny pink skin.  There will be no more drainage when the wound is completely healed.  It will take six months to one year for the redness to fade.  The scar may be itchy, tight and sensitive to extreme temperatures for a year after the surgery.  Massaging the area several times a day for several minutes after the wound is completely healed will help the scar soften and normalize faster. Begin massage only after healing is complete.      In case of emergency call: Dr Jarvis: [phone]    [facility]: [phone]    [facility]:[phone]

## 2024-08-27 NOTE — LETTER
8/27/2024      Cecilio Navarro  [address] [facility] [address]      Dear Colleague,    Thank you for referring your patient, Cecilio Navarro, to the [facility]. Please see a copy of my visit note below.    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  .  Patient has no other skin complaints today.  Remainder of the HPI, Meds, PMH, Allergies, FH, and SH was reviewed in chart.      Past Medical History:   Diagnosis Date     Actinic keratoses      Basal cell carcinoma      Squamous cell carcinoma of skin, unspecified      Type II or unspecified type diabetes mellitus without mention of complication, not stated as uncontrolled      Unspecified disorder of male genital organs      Unspecified essential hypertension        Past Surgical History:   Procedure Laterality Date     COLONOSCOPY  10/16/2013    Diverticulosis in sigmoid colon; repeat in 5 years; Surgeon: Garry Ambrose MD;  Location: [facility] REMOVE TONSILS/ADENOIDS,<13 Y/O          Family History   Problem Relation Age of Onset     Hypertension Mother      Cerebrovascular Disease Mother         mentally affected     Cancer - colorectal Father         mets to liver  age76     Gastrointestinal Disease Brother         multilple colon polyps     Hypertension Brother      Gastrointestinal Disease Sister         reflux     Obesity Son      Allergies Daughter      Arthritis Brother      Melanoma No family hx of        Social History     Socioeconomic History     Marital status:      Spouse name: Not on file     Number of children: Not on file     Years of education: Not on file     Highest education level: Not on file   Occupational History     Not on file   Tobacco Use     Smoking status: Never     Smokeless  tobacco: Never   Vaping Use     Vaping status: Never Used   Substance and Sexual Activity     Alcohol use: Yes     Comment: occasional social     Drug use: No     Sexual activity: Yes     Partners: Female   Other Topics Concern      Service No     Blood Transfusions No     Caffeine Concern Yes     Comment: 1-2 day     Occupational Exposure No     Hobby Hazards Yes     Comment: [employer]     Sleep Concern No     Stress Concern No     Weight Concern No     Special Diet Yes     Comment: Diabetic and low fat     Back Care No     Exercise Yes     Bike Helmet Not Asked     Seat Belt Yes     Self-Exams Yes     Comment: check blood sugars     Parent/sibling w/ CABG, MI or angioplasty before 65F 55M? Not Asked   Social History Narrative     Not on file     Social Determinants of Health     Financial Resource Strain: Low Risk  (12/27/2023)    Financial Resource Strain      Within the past 12 months, have you or your family members you live with been unable to get utilities (heat, electricity) when it was really needed?: No   Food Insecurity: Low Risk  (12/27/2023)    Food Insecurity      Within the past 12 months, did you worry that your food would run out before you got money to buy more?: No      Within the past 12 months, did the food you bought just not last and you didn t have money to get more?: No   Transportation Needs: Low Risk  (12/27/2023)    Transportation Needs      Within the past 12 months, has lack of transportation kept you from medical appointments, getting your medicines, non-medical meetings or appointments, work, or from getting things that you need?: No   Physical Activity: Not on file   Stress: Not on file   Social Connections: Not on file   Interpersonal Safety: Not on file   Housing Stability: Low Risk  (12/27/2023)    Housing Stability      Do you have housing? : Yes      Are you worried about losing your housing?: No       Outpatient Encounter Medications as of 8/27/2024   Medication Sig Dispense  Refill     aspirin 81 MG tablet Take 81 mg by mouth daily       atorvastatin (LIPITOR) 40 MG tablet Take 1 tablet (40 mg) by mouth daily 90 tablet 3     blood glucose (NO BRAND SPECIFIED) test strip Use to test blood sugar 1 times daily.  Accu-chek guide me strips. 100 strip 3     blood glucose monitoring (SOFTCLIX) lancets Use to test blood sugar 1 times daily 100 each 5     ciclopirox (LOPROX) 0.77 % cream Apply topically at bedtime 90 g 6     doxycycline monohydrate (MONODOX) 100 MG capsule Take 1 capsule (100 mg) by mouth 2 times daily 60 capsule 6     glipiZIDE (GLUCOTROL XL) 10 MG 24 hr tablet Take 1 tablet (10 mg) by mouth daily 90 tablet 3     hydrochlorothiazide (HYDRODIURIL) 25 MG tablet Take 1 tablet (25 mg) by mouth daily 90 tablet 3     lisinopril (ZESTRIL) 40 MG tablet Take 1 tablet (40 mg) by mouth daily 90 tablet 3     metFORMIN (GLUCOPHAGE-XR) 750 MG 24 hr tablet Take 1 tablet (750 mg) by mouth 2 times daily (with meals) 180 tablet 3     metoprolol succinate ER (TOPROL XL) 50 MG 24 hr tablet Take 1 tablet (50 mg) by mouth daily 90 tablet 3     mometasone (ELOCON) 0.1 % external cream Apply topically daily To the back 60 g 6     Semaglutide (RYBELSUS) 7 MG tablet TAKE 1 TABLET BY MOUTH IN THE MORNING ON AN EMPTY STOMACH. NO OTHER MEDICATION/FOOD FOR 30 MINUTES. 90 tablet 3     sildenafil (VIAGRA) 100 MG cap/tab Take 1 tablet (100 mg) by mouth daily as needed for erectile dysfunction 3 tablet 11     tamsulosin (FLOMAX) 0.4 MG capsule Take 1 capsule (0.4 mg) by mouth daily 90 capsule 3     vardenafil (LEVITRA) 20 MG tablet Take 1 tablet (20 mg) by mouth daily as needed for erectile dysfunction 5 tablet 11     No facility-administered encounter medications on file as of 8/27/2024.             O:   NAD, WDWN, Alert & Oriented, Mood & Affect wnl, Vitals stable   General appearance normal   Vitals stable   Alert, oriented and in no acute distress      Following lymph nodes palpated: Occipital, Cervical,  Supraclavicular no lad  L antihelix 5mm scaly papule       Eyes: Conjunctivae/lids:Normal     ENT: Lips, mucosa: normal    MSK:Normal    Cardiovascular: peripheral edema none    Pulm: Breathing Normal    Lymph Nodes: No Head and Neck Lymphadenopathy     Neuro/Psych: Orientation:Alert and Orientedx3 ; Mood/Affect:normal       A/P:  L antihelix squamous cell carcinoma   MOHS:   Location    The rationale for Mohs surgery was discussed with the patient and consent was obtained.  The risks and benefits as well as alternatives to therapy were discussed, in detail.  Specifically, the risks of infection, scarring, bleeding, prolonged wound healing, incomplete removal, allergy to anesthesia, nerve injury and recurrence were addressed.  Indication for Mohs was Location. Prior to the procedure, the treatment site was clearly identified and, if available, confirmed with previous photos and confirmed by the patient   All components of the Universal Protocol/PAUSE rule were completed.  The Mohs surgeon operated in two distinct and integrated capacities as the surgeon and pathologist.      The area was prepped with Betasept.  A rim of normal appearing skin was marked circumferentially around the lesion.  The area was infiltrated with local anesthesia.  The tumor was first debulked to remove all clinically apparent tumor.  An incision following the standard Mohs approach was done and the specimen was oriented,mapped and placed in 1 block(s).  Each specimen was then chromacoded and processed in the Mohs laboratory using standard Mohs technique and submitted for frozen section histology.  Frozen section analysis showed  residual tumor but CLEAR MARGINS.    1st stage:There is a proliferation of irregular nests of abnormal squamous cells arising from the epidermis and invading the dermis. These are well differentiated. The dermis shows a variable superficial perivascular inflammatory infiltrate.     The tumor was excised using standard  Mohs technique in 1 stages(s).  CLEAR MARGINS OBTAINED and Final defect size was 1 cm.     We discussed the options for wound management in full with the patient including risks/benefits/ possible outcomes.      REPAIR SECOND INTENT: We discussed the options for wound management in full with the patient including risks/benefits/possible outcomes. Decision made to allow the wound to heal by second intention. Cautery was used for for hemostasis. EBL minimal; complications none; wound care routine.  The patient was discharged in good condition and will return in one month or prn for wound evaluation.  It was a pleasure speaking to Cecilio Navarro today.  Previous clinic notes and pertinent laboratory tests were reviewed prior to Cecilio Navarro's visit.  Signs and Symptoms of skin cancer discussed with patient.  Patient encouraged to perform monthly skin exams.  UV precautions reviewed with patient.  Risks of non-melanoma skin cancer discussed with patient   Return to clinic 6 months        Again, thank you for allowing me to participate in the care of your patient.        Sincerely,        Tejinder Jarvis MD

## 2024-08-27 NOTE — NURSING NOTE
Chief Complaint   Patient presents with    Derm Problem     Mohs- SCC- L antihelix         There were no vitals filed for this visit.  Wt Readings from Last 1 Encounters:   12/27/23 85.3 kg (188 lb)       Aleisha Crocker LPN .................8/27/2024

## 2024-10-17 ENCOUNTER — ALLIED HEALTH/NURSE VISIT (OUTPATIENT)
Dept: FAMILY MEDICINE | Facility: CLINIC | Age: 84
End: 2024-10-17
Payer: COMMERCIAL

## 2024-10-17 DIAGNOSIS — Z23 ENCOUNTER FOR IMMUNIZATION: Primary | ICD-10-CM

## 2024-10-17 PROCEDURE — 90480 ADMN SARSCOV2 VAC 1/ONLY CMP: CPT

## 2024-10-17 PROCEDURE — G0008 ADMIN INFLUENZA VIRUS VAC: HCPCS

## 2024-10-17 PROCEDURE — 90662 IIV NO PRSV INCREASED AG IM: CPT

## 2024-10-17 PROCEDURE — 99207 PR NO CHARGE NURSE ONLY: CPT

## 2024-10-17 PROCEDURE — 91320 SARSCV2 VAC 30MCG TRS-SUC IM: CPT

## 2024-10-17 NOTE — PROGRESS NOTES
Prior to immunization administration, verified patients identity using patient s name and date of birth. Please see Immunization Activity for additional information.     Is the patient's temperature normal (100.5 or less)? No     DO NOT VACCINATE. All vaccines should be delayed until the illness has improved. Antibiotics are not a contraindication.       Patient instructed to remain in clinic for 15 minutes afterwards, and to report any adverse reactions.      Link to Ancillary Visit Immunization Standing Orders SmartSet     Screening performed by Debo Gibsno CMA on 10/17/2024 at 2:44 PM.

## 2024-11-27 ENCOUNTER — PATIENT OUTREACH (OUTPATIENT)
Dept: CARE COORDINATION | Facility: CLINIC | Age: 84
End: 2024-11-27
Payer: COMMERCIAL

## 2024-12-11 ENCOUNTER — TELEPHONE (OUTPATIENT)
Dept: FAMILY MEDICINE | Facility: CLINIC | Age: 84
End: 2024-12-11

## 2024-12-11 ENCOUNTER — OFFICE VISIT (OUTPATIENT)
Dept: FAMILY MEDICINE | Facility: CLINIC | Age: 84
End: 2024-12-11
Payer: COMMERCIAL

## 2024-12-11 ENCOUNTER — PATIENT OUTREACH (OUTPATIENT)
Dept: CARE COORDINATION | Facility: CLINIC | Age: 84
End: 2024-12-11

## 2024-12-11 VITALS
SYSTOLIC BLOOD PRESSURE: 136 MMHG | HEART RATE: 74 BPM | OXYGEN SATURATION: 100 % | HEIGHT: 67 IN | DIASTOLIC BLOOD PRESSURE: 76 MMHG | RESPIRATION RATE: 18 BRPM | WEIGHT: 190 LBS | TEMPERATURE: 98.7 F | BODY MASS INDEX: 29.82 KG/M2

## 2024-12-11 DIAGNOSIS — E78.5 HYPERLIPIDEMIA LDL GOAL <100: ICD-10-CM

## 2024-12-11 DIAGNOSIS — N40.1 BPH WITH OBSTRUCTION/LOWER URINARY TRACT SYMPTOMS: ICD-10-CM

## 2024-12-11 DIAGNOSIS — N13.8 BPH WITH OBSTRUCTION/LOWER URINARY TRACT SYMPTOMS: ICD-10-CM

## 2024-12-11 DIAGNOSIS — E11.21 TYPE 2 DIABETES MELLITUS WITH DIABETIC NEPHROPATHY, WITHOUT LONG-TERM CURRENT USE OF INSULIN (H): Primary | ICD-10-CM

## 2024-12-11 DIAGNOSIS — I10 HTN, GOAL BELOW 140/90: ICD-10-CM

## 2024-12-11 LAB
ANION GAP SERPL CALCULATED.3IONS-SCNC: 13 MMOL/L (ref 7–15)
BUN SERPL-MCNC: 14.7 MG/DL (ref 8–23)
CALCIUM SERPL-MCNC: 9.5 MG/DL (ref 8.8–10.4)
CHLORIDE SERPL-SCNC: 98 MMOL/L (ref 98–107)
CHOLEST SERPL-MCNC: 122 MG/DL
CREAT SERPL-MCNC: 0.95 MG/DL (ref 0.67–1.17)
CREAT UR-MCNC: 115.3 MG/DL
EGFRCR SERPLBLD CKD-EPI 2021: 80 ML/MIN/1.73M2
EST. AVERAGE GLUCOSE BLD GHB EST-MCNC: 169 MG/DL
FASTING STATUS PATIENT QL REPORTED: NO
FASTING STATUS PATIENT QL REPORTED: NO
GLUCOSE SERPL-MCNC: 148 MG/DL (ref 70–99)
HBA1C MFR BLD: 7.5 % (ref 0–5.6)
HCO3 SERPL-SCNC: 29 MMOL/L (ref 22–29)
HDLC SERPL-MCNC: 48 MG/DL
LDLC SERPL CALC-MCNC: 56 MG/DL
MICROALBUMIN UR-MCNC: 13.6 MG/L
MICROALBUMIN/CREAT UR: 11.8 MG/G CR (ref 0–17)
NONHDLC SERPL-MCNC: 74 MG/DL
POTASSIUM SERPL-SCNC: 3.6 MMOL/L (ref 3.4–5.3)
SODIUM SERPL-SCNC: 140 MMOL/L (ref 135–145)
TRIGL SERPL-MCNC: 92 MG/DL

## 2024-12-11 PROCEDURE — 82043 UR ALBUMIN QUANTITATIVE: CPT | Performed by: FAMILY MEDICINE

## 2024-12-11 PROCEDURE — 82570 ASSAY OF URINE CREATININE: CPT | Performed by: FAMILY MEDICINE

## 2024-12-11 PROCEDURE — 80048 BASIC METABOLIC PNL TOTAL CA: CPT | Performed by: FAMILY MEDICINE

## 2024-12-11 PROCEDURE — 36415 COLL VENOUS BLD VENIPUNCTURE: CPT | Performed by: FAMILY MEDICINE

## 2024-12-11 PROCEDURE — 99214 OFFICE O/P EST MOD 30 MIN: CPT | Performed by: FAMILY MEDICINE

## 2024-12-11 PROCEDURE — 80061 LIPID PANEL: CPT | Performed by: FAMILY MEDICINE

## 2024-12-11 PROCEDURE — 83036 HEMOGLOBIN GLYCOSYLATED A1C: CPT | Performed by: FAMILY MEDICINE

## 2024-12-11 PROCEDURE — G2211 COMPLEX E/M VISIT ADD ON: HCPCS | Performed by: FAMILY MEDICINE

## 2024-12-11 RX ORDER — ORAL SEMAGLUTIDE 7 MG/1
TABLET ORAL
Qty: 90 TABLET | Refills: 3 | Status: SHIPPED | OUTPATIENT
Start: 2024-12-11

## 2024-12-11 RX ORDER — METOPROLOL SUCCINATE 50 MG/1
50 TABLET, EXTENDED RELEASE ORAL DAILY
Qty: 90 TABLET | Refills: 3 | Status: SHIPPED | OUTPATIENT
Start: 2024-12-11

## 2024-12-11 RX ORDER — HYDROCHLOROTHIAZIDE 25 MG/1
25 TABLET ORAL DAILY
Qty: 90 TABLET | Refills: 3 | Status: SHIPPED | OUTPATIENT
Start: 2024-12-11

## 2024-12-11 RX ORDER — TAMSULOSIN HYDROCHLORIDE 0.4 MG/1
0.4 CAPSULE ORAL DAILY
Qty: 90 CAPSULE | Refills: 3 | Status: SHIPPED | OUTPATIENT
Start: 2024-12-11

## 2024-12-11 RX ORDER — METFORMIN HYDROCHLORIDE 750 MG/1
750 TABLET, EXTENDED RELEASE ORAL 2 TIMES DAILY WITH MEALS
Qty: 180 TABLET | Refills: 3 | Status: SHIPPED | OUTPATIENT
Start: 2024-12-11

## 2024-12-11 RX ORDER — LISINOPRIL 40 MG/1
40 TABLET ORAL DAILY
Qty: 90 TABLET | Refills: 3 | Status: SHIPPED | OUTPATIENT
Start: 2024-12-11

## 2024-12-11 RX ORDER — GLIPIZIDE 10 MG/1
10 TABLET, FILM COATED, EXTENDED RELEASE ORAL DAILY
Qty: 90 TABLET | Refills: 3 | Status: SHIPPED | OUTPATIENT
Start: 2024-12-11

## 2024-12-11 ASSESSMENT — PAIN SCALES - GENERAL: PAINLEVEL_OUTOF10: NO PAIN (0)

## 2024-12-11 NOTE — TELEPHONE ENCOUNTER
Hello,    I received a fax stating the a PA Renewal is need for Rybelsus 7MG tablets.    Thank You!    Sukhdev Thomas [facility] Liaison  [facility]  [email or web address]  Phone: [phone]  Fax: [phone]

## 2024-12-11 NOTE — PROGRESS NOTES
S: Cecilio Navarro is a 84 year old male with Dm2, some nephropathy.  Recheck A1C, refills on meds.      Htn; stable on meds, fills/labs    BPH: stable on flomax.  Fills    Hyperlipidemia: stable.  Fills/labs    Current Outpatient Medications   Medication Sig Dispense Refill    aspirin 81 MG tablet Take 81 mg by mouth daily      atorvastatin (LIPITOR) 40 MG tablet Take 1 tablet (40 mg) by mouth daily 90 tablet 3    blood glucose (NO BRAND SPECIFIED) test strip Use to test blood sugar 1 times daily.  Accu-chek guide me strips. 100 strip 3    blood glucose monitoring (SOFTCLIX) lancets Use to test blood sugar 1 times daily 100 each 5    ciclopirox (LOPROX) 0.77 % cream Apply topically at bedtime 90 g 6    glipiZIDE (GLUCOTROL XL) 10 MG 24 hr tablet Take 1 tablet (10 mg) by mouth daily. 90 tablet 3    hydrochlorothiazide (HYDRODIURIL) 25 MG tablet Take 1 tablet (25 mg) by mouth daily. 90 tablet 3    lisinopril (ZESTRIL) 40 MG tablet Take 1 tablet (40 mg) by mouth daily. 90 tablet 3    metFORMIN (GLUCOPHAGE-XR) 750 MG 24 hr tablet Take 1 tablet (750 mg) by mouth 2 times daily (with meals). 180 tablet 3    metoprolol succinate ER (TOPROL XL) 50 MG 24 hr tablet Take 1 tablet (50 mg) by mouth daily. 90 tablet 3    mometasone (ELOCON) 0.1 % external cream Apply topically daily To the back 60 g 6    Semaglutide (RYBELSUS) 7 MG tablet TAKE 1 TABLET BY MOUTH IN THE MORNING ON AN EMPTY STOMACH. NO OTHER MEDICATION/FOOD FOR 30 MINUTES. 90 tablet 3    sildenafil (VIAGRA) 100 MG cap/tab Take 1 tablet (100 mg) by mouth daily as needed for erectile dysfunction 3 tablet 11    tamsulosin (FLOMAX) 0.4 MG capsule Take 1 capsule (0.4 mg) by mouth daily. 90 capsule 3    vardenafil (LEVITRA) 20 MG tablet Take 1 tablet (20 mg) by mouth daily as needed for erectile dysfunction 5 tablet 11    doxycycline monohydrate (MONODOX) 100 MG capsule Take 1 capsule (100 mg) by mouth 2 times daily 60 capsule 6     O:/76   Pulse 74   Temp 98.7  " F (37.1  C) (Tympanic)   Resp 18   Ht 1.689 m (5' 6.5\")   Wt 86.2 kg (190 lb)   SpO2 100%   BMI 30.21 kg/m    GEN: Alert and oriented, in no acute distress  CV: RRR, no murmur  RESP: lungs clear bilaterally, good effort  EXT: no edema or lesions noted in lower extremities    A:  Dm2, some nephropathy.  recheck  Htn; stable on meds  BPH: stable on flomax.    Hyperlipidemia: stable.  Statin    P: fills on meds for chronic issues as above in med list and orders.   Labs ordered as appropriate for monitoring the listed medical conditions.    Continue medications for medical issues as above in med list and orders     The longitudinal plan of care for the diagnosis(es)/condition(s) as documented were addressed during this visit. Due to the added complexity in care, I will continue to support Cecilio in the subsequent management and with ongoing continuity of care.    "

## 2024-12-13 NOTE — TELEPHONE ENCOUNTER
Central Prior Authorization Team   Phone: [phone]    PA Initiation    Medication: Rybelsus 7MG  Insurance Company: [employer] - Phone [phone] Fax [phone]  Pharmacy Filling the Rx: [facility] [address] [place] [address]  Filling Pharmacy Phone: [phone]  Filling Pharmacy Fax:    Start Date: 12/13/2024

## 2024-12-17 NOTE — TELEPHONE ENCOUNTER
Prior Authorization Approval    Authorization Effective Date: 12/17/2024  Authorization Expiration Date: 12/31/2025  Medication: Rybelsus 7MG  Reference #:     Insurance Company: [employer] - Phone [phone] Fax [phone]  Which Pharmacy is filling the prescription (Not needed for infusion/clinic administered): [facility] [address] [place] [address] [address] [facility]  Pharmacy Notified: Yes  Patient Notified: Instructed pharmacy to notify patient when script is ready to /ship.

## 2024-12-18 ENCOUNTER — TELEPHONE (OUTPATIENT)
Dept: FAMILY MEDICINE | Facility: CLINIC | Age: 84
End: 2024-12-18
Payer: COMMERCIAL

## 2024-12-18 NOTE — TELEPHONE ENCOUNTER
Ryelsus application has been interofficed to Dr Morales and mailed to Cecilio to review, sign, and send back to me.     We will note EPIC when sent to [employer].     Thank you!    Pauly Gorman   Prescription

## 2024-12-19 ENCOUNTER — VIRTUAL VISIT (OUTPATIENT)
Dept: EDUCATION SERVICES | Facility: CLINIC | Age: 84
End: 2024-12-19
Payer: COMMERCIAL

## 2024-12-19 DIAGNOSIS — E11.21 TYPE 2 DIABETES MELLITUS WITH DIABETIC NEPHROPATHY, WITHOUT LONG-TERM CURRENT USE OF INSULIN (H): Primary | ICD-10-CM

## 2024-12-19 NOTE — PROGRESS NOTES
Diabetes Self-Management Education & Support    Presents for: Individual review    Type of Service: Telephone Visit    Originating Location (Patient Location): Home  Distant Location (Provider Location): [facility]  Mode of Communication:  Telephone    Telephone Visit Start Time: 1:05 PM  Telephone Visit End Time (telephone visit stop time):     How would patient like to obtain AVS? MyChart      Assessment  -A1c up some 7.5%, will increase the rybelsus to 14 mg  -he will continue to watch portions and treats  -he will be getting the patient assistance paperwork soon and get that going for next year  -he does deal with diarrhea with metformin so he does not always take all the doses if he is going to be away from home    Patient's most recent   Lab Results   Component Value Date    A1C 7.5 12/11/2024    A1C 7.0 05/10/2021     is meeting goal of <8.0    Diabetes knowledge and skills assessment:   Patient is knowledgeable in diabetes management concepts related to: Healthy Eating, Being Active, Monitoring, and Taking Medication    Based on learning assessment above, most appropriate setting for further diabetes education would be: Individual setting.    Care Plan and Education Provided:  There are no care plans that you recently modified to display for this patient.      Patient verbalized understanding of diabetes self-management education concepts discussed, opportunities for ongoing education and support, and recommendations provided today.    Plan  Increase the Rybelsus to 14 mg dose daily once you are out of the 7 mg dose.        Follow-up:  Upcoming Diabetes Ed Appointments     Visit Type Date Time Department    DIABETES ED 12/19/2024  1:00 PM [facility] DIABETES ED        See Care Plan for co-developed, patient-state behavior change goals.    Education Materials Provided:  No new materials provided today      Subjective/Objective  Cecilio is an 84 year old year old, presenting for the following  "diabetes education related to: Presents for: Individual review  Accompanied by: Self  Diabetes education in the past 24mo: Yes  Diabetes type: Type 2  Disease course: Stable  How confident are you filling out medical forms by yourself:: Somewhat  Cultural Influences/Ethnic Background:  Not  or       Diabetes Symptoms & Complications:  Diabetes Related Symptoms: None  Weight trend: Stable  Symptom course: Stable  Disease course: Stable       Patient Problem List and Family Medical History reviewed for relevant medical history, current medical status, and diabetes risk factors.    Vitals:  There were no vitals taken for this visit.  Estimated body mass index is 30.21 kg/m  as calculated from the following:    Height as of 12/11/24: 1.689 m (5' 6.5\").    Weight as of 12/11/24: 86.2 kg (190 lb).   Last 3 BP:   BP Readings from Last 3 Encounters:   12/11/24 136/76   12/27/23 126/82   05/22/23 104/64       History   Smoking Status    Never   Smokeless Tobacco    Never       Labs:  Lab Results   Component Value Date    A1C 7.5 12/11/2024    A1C 7.0 05/10/2021     Lab Results   Component Value Date     12/11/2024     09/22/2021     11/07/2019     Lab Results   Component Value Date    LDL 56 12/11/2024    LDL 35 09/16/2020     HDL Cholesterol   Date Value Ref Range Status   09/16/2020 43 >39 mg/dL Final     Direct Measure HDL   Date Value Ref Range Status   12/11/2024 48 >=40 mg/dL Final   ]  GFR Estimate   Date Value Ref Range Status   12/11/2024 80 >60 mL/min/1.73m2 Final     Comment:     eGFR calculated using 2021 CKD-EPI equation.   11/07/2019 82 >60 mL/min/[1.73_m2] Final     Comment:     Non  GFR Calc  Starting 12/18/2018, serum creatinine based estimated GFR (eGFR) will be   calculated using the Chronic Kidney Disease Epidemiology Collaboration   (CKD-EPI) equation.       GFR Estimate If Black   Date Value Ref Range Status   11/07/2019 >90 >60 mL/min/[1.73_m2] Final " "    Comment:      GFR Calc  Starting 12/18/2018, serum creatinine based estimated GFR (eGFR) will be   calculated using the Chronic Kidney Disease Epidemiology Collaboration   (CKD-EPI) equation.       Lab Results   Component Value Date    CR 0.95 12/11/2024    CR 0.90 11/07/2019     No results found for: \"MICROALBUMIN\"    Healthy Eating:  Healthy Eating Assessed Today: Yes  Cultural/Islam diet restrictions?: No  How many times a week on average do you eat food made away from home (restaurant/take-out)?: 2  Meals include: Breakfast, Lunch, Dinner  Breakfast: bagels with cream cheese, occasionally cereal.  saturday: oatmeal sunday: you and eggs  Lunch: yanna melt, fries  Dinner: pot pies or clam chowder or cheese sandiwhc or beer cheese soup and ice cream or broccoli cheese soup or yanna melt  Snacks: has been doing some candy lately, raisins some as well  Other: last a1c 7.5%  Beverages: Water, Coffee, Milk, Juice, Diet soda    Being Active:  Exercise:: Yes (walking the dog daily)  Barrier to exercise: None    Monitoring:  Blood Glucose Meter: Reli-On  Times checking blood sugar at home (number): Other (see Comments)  Please elaborate:: once a week  Times checking blood sugar at home (per): Week    Not doing much testing, recent a1c    Bertha Norwood RD  Time Spent: 60 minutes  Encounter Type: Individual    Any diabetes medication dose changes were made via the CDCES Standing Orders under the patient's referring provider.  Answers submitted by the patient for this visit:  Questionnaire about: Diabetes problem (Submitted on 12/14/2024)  Chief Complaint: Diabetes problem    "

## 2025-01-03 DIAGNOSIS — E78.5 HYPERLIPIDEMIA LDL GOAL <100: ICD-10-CM

## 2025-01-06 RX ORDER — ATORVASTATIN CALCIUM 40 MG/1
40 TABLET, FILM COATED ORAL DAILY
Qty: 90 TABLET | Refills: 3 | Status: SHIPPED | OUTPATIENT
Start: 2025-01-06

## 2025-01-15 ENCOUNTER — OFFICE VISIT (OUTPATIENT)
Dept: AUDIOLOGY | Facility: CLINIC | Age: 85
End: 2025-01-15
Payer: COMMERCIAL

## 2025-01-15 DIAGNOSIS — H90.3 SENSORINEURAL HEARING LOSS, BILATERAL: Primary | ICD-10-CM

## 2025-01-15 NOTE — PROGRESS NOTES
AUDIOLOGY REPORT: HEARING AID RECHECK    SUBJECTIVE: Cecilio Navarro is a 84 year old male, :  1940, was seen in the Audiology Clinic at [facility] on 1/15/25 for a return check of their hearing aids. The patient was accompanied by their wife.      Background:   Patient is here today with the complaint of not working.     Procedures:       SIDE: Left    : Icecreamlabs    TYPE: [employer] M50R JOY    S/N: [id number]     WARRANTY:  2021    General cleaning was performed. Replaced medium open domes and waxguards bilaterally. Following cleaning the hearing aid was returned to function. Discussed how to use the 'waxguard changer'. Biologic listening check found the hearing aids sounding crisp and clear.     Plan:   Patient will return as needed for hearing aid concerns.     Zaire Mojica CCC-A  Licensed Audiologist #[id number]  1/15/2025

## 2025-01-17 ENCOUNTER — TELEPHONE (OUTPATIENT)
Dept: FAMILY MEDICINE | Facility: CLINIC | Age: 85
End: 2025-01-17
Payer: COMMERCIAL

## 2025-01-17 NOTE — TELEPHONE ENCOUNTER
Prior Authorization Retail Medication Request    Medication/Dose: Rybelsus 14mg  Diagnosis and ICD code (if different than what is on RX):     New/renewal/insurance change PA/secondary ins. PA:  Previously Tried and Failed:     Rationale:  pt stable on this rx.  PA  24. Please renew.     Insurance   Primary:    Insurance ID:   covermymeds [id number]    Secondary (if applicable):   Insurance ID:       Pharmacy Information (if different than what is on RX)  Name:     Phone:     Fax:     Clinic Information  Preferred routing pool for dept communication:

## 2025-01-17 NOTE — LETTER
1/17/2025      Cecilio Navarro  [address] [place] [id number]        To whom it may concern,    Cecilio Navarro has had excellent results with Rybelsus.  His A1C has come down from 10.2 to 7.5 during his time on the medication.  He is tolerating it well, it has helped him lose weight.  He is 84, and moving to an injectable medication is a burden for him that might affect adherence to his treatment plan.      It is medically appropriate for him to stay on Rybelsus given above.         Sincerely,        Damion Morales MD    Electronically signed

## 2025-01-21 NOTE — TELEPHONE ENCOUNTER
Central Prior Authorization Team   Phone: [phone]    PA Initiation    Medication: Rybelsus 14mg  Insurance Company: [facility] - Phone [phone] Fax [phone]  Pharmacy Filling the Rx: [facility] [address] [place] [address]  Filling Pharmacy Phone: [phone]  Filling Pharmacy Fax:    Start Date: 1/21/2025

## 2025-01-24 NOTE — TELEPHONE ENCOUNTER
[facility]  [address] [place] [id number]  Phone: [phone]  Fax: [phone]               2025        Cecilio Navarro  [address] [place] [id number]           To whom it may concern,     Cecilio Navarro has had excellent results with Rybelsus.  His A1C has come down from 10.2 to 7.5 during his time on the medication.  He is tolerating it well, it has helped him lose weight.  He is 84, and moving to an injectable medication is a burden for him that might affect adherence to his treatment plan.       It is medically appropriate for him to stay on Rybelsus given above.            Sincerely,           Damion Morales MD     Electronically signed  Patient Name: Cecilio Navarro                   : 1940                  PAGE:

## 2025-01-24 NOTE — TELEPHONE ENCOUNTER
Medication Appeal Initiation    We have initiated an appeal for the requested medication:  Medication: Rybelsus 14mg  Appeal Start Date:  1/24/2025  Insurance Company:  [employer]  Comments:  Appeal has been faxed to insurance

## 2025-01-24 NOTE — TELEPHONE ENCOUNTER
PRIOR AUTHORIZATION DENIED    Medication: Rybelsus 14mg    Denial Date: 1/24/2025    Denial Rational: Patient needs to try and fail alternatives listed below:         Appeal Information: Review the plan's reasons for denial listed above. Please utilize that information to complete letter and provide specific, detailed clinical information/rationale of your patient's health status to address their denial reasons.

## 2025-01-27 NOTE — TELEPHONE ENCOUNTER
Prior Authorization Approval    Authorization Effective Date: 1/25/2025  Authorization Expiration Date: 1/25/2026  Medication: Rybelsus 14mg  Reference #:     Insurance Company: [facility] - Phone [phone] Fax [phone]  Which Pharmacy is filling the prescription (Not needed for infusion/clinic administered): [facility] [address] [place] [address] [address] [facility]  Pharmacy Notified: Yes  Patient Notified: Instructed pharmacy to notify patient when script is ready to /ship.

## 2025-01-30 ENCOUNTER — TELEPHONE (OUTPATIENT)
Dept: FAMILY MEDICINE | Facility: CLINIC | Age: 85
End: 2025-01-30
Payer: COMMERCIAL

## 2025-01-30 NOTE — TELEPHONE ENCOUNTER
Migel's Aydinsus assistance application has been submitted to the [employer] assistance program.    We will note Epic when we receive a decision.    Thanks so much for your help!    Gabbi aMynard  Prescription Assistance Supervisor  Pharmacy Assistance   Pool: [facility]

## 2025-02-01 ENCOUNTER — HEALTH MAINTENANCE LETTER (OUTPATIENT)
Age: 85
End: 2025-02-01

## 2025-02-06 ENCOUNTER — TELEPHONE (OUTPATIENT)
Dept: FAMILY MEDICINE | Facility: CLINIC | Age: 85
End: 2025-02-06
Payer: COMMERCIAL

## 2025-02-06 NOTE — TELEPHONE ENCOUNTER
Cecilio has been approved to the  assistance program for Rybelsus through December 31,2025. He will receive this medication at no cost through the enrollment period. He has been informed of this approval and delivery.     A 120 day supply of Rybelsus will be delivered to the [facility] within 10-14 business days. Please contact patient upon arrival.     Please let us know of any dose changes. Patient will need to contact our office for refills.  We will send Cecilio a letter with refill details.     Thank you!  Pauly Gorman   Prescription

## 2025-02-17 ENCOUNTER — OFFICE VISIT (OUTPATIENT)
Dept: DERMATOLOGY | Facility: CLINIC | Age: 85
End: 2025-02-17
Payer: COMMERCIAL

## 2025-02-17 DIAGNOSIS — D23.9 DERMAL NEVUS: Primary | ICD-10-CM

## 2025-02-17 DIAGNOSIS — L82.1 SEBORRHEIC KERATOSES: ICD-10-CM

## 2025-02-17 DIAGNOSIS — Z85.828 HISTORY OF SKIN CANCER: ICD-10-CM

## 2025-02-17 DIAGNOSIS — L81.4 LENTIGO: ICD-10-CM

## 2025-02-17 DIAGNOSIS — D18.01 ANGIOMA OF SKIN: ICD-10-CM

## 2025-02-17 PROCEDURE — 99213 OFFICE O/P EST LOW 20 MIN: CPT | Performed by: DERMATOLOGY

## 2025-02-17 NOTE — PROGRESS NOTES
Cecilio Navarro is an extremely pleasant 84 year old year old male patient here today for hx of non-melanoma skin cancer.  Patient has no other skin complaints today.  Remainder of the HPI, Meds, PMH, Allergies, FH, and SH was reviewed in chart.      Past Medical History:   Diagnosis Date    Actinic keratoses     Basal cell carcinoma     Squamous cell carcinoma of skin, unspecified     Type II or unspecified type diabetes mellitus without mention of complication, not stated as uncontrolled     Unspecified disorder of male genital organs     Unspecified essential hypertension        Past Surgical History:   Procedure Laterality Date    COLONOSCOPY  10/16/2013    Diverticulosis in sigmoid colon; repeat in 5 years; Surgeon: Garry Ambrose MD;  Location: [facility]    HC REMOVE TONSILS/ADENOIDS,<13 Y/O          Family History   Problem Relation Age of Onset    Hypertension Mother     Cerebrovascular Disease Mother         mentally affected    Cancer - colorectal Father         mets to liver  age76    Gastrointestinal Disease Brother         multilple colon polyps    Hypertension Brother     Gastrointestinal Disease Sister         reflux    Obesity Son     Allergies Daughter     Arthritis Brother     Melanoma No family hx of        Social History     Socioeconomic History    Marital status:      Spouse name: Not on file    Number of children: Not on file    Years of education: Not on file    Highest education level: Not on file   Occupational History    Not on file   Tobacco Use    Smoking status: Never    Smokeless tobacco: Never   Vaping Use    Vaping status: Never Used   Substance and Sexual Activity    Alcohol use: Yes     Comment: occasional social    Drug use: No    Sexual activity: Yes     Partners: Female   Other Topics Concern     Service No    Blood Transfusions No    Caffeine Concern Yes     Comment: 1-2 day    Occupational Exposure No    Hobby Hazards Yes     Comment: [employer]    Sleep Concern  No    Stress Concern No    Weight Concern No    Special Diet Yes     Comment: Diabetic and low fat    Back Care No    Exercise Yes    Bike Helmet Not Asked    Seat Belt Yes    Self-Exams Yes     Comment: check blood sugars    Parent/sibling w/ CABG, MI or angioplasty before 65F 55M? Not Asked   Social History Narrative    Not on file     Social Drivers of Health     Financial Resource Strain: Low Risk  (12/27/2023)    Financial Resource Strain     Within the past 12 months, have you or your family members you live with been unable to get utilities (heat, electricity) when it was really needed?: No   Food Insecurity: Low Risk  (12/27/2023)    Food Insecurity     Within the past 12 months, did you worry that your food would run out before you got money to buy more?: No     Within the past 12 months, did the food you bought just not last and you didn t have money to get more?: No   Transportation Needs: Low Risk  (12/27/2023)    Transportation Needs     Within the past 12 months, has lack of transportation kept you from medical appointments, getting your medicines, non-medical meetings or appointments, work, or from getting things that you need?: No   Physical Activity: Not on file   Stress: Not on file   Social Connections: Not on file   Interpersonal Safety: Low Risk  (12/11/2024)    Interpersonal Safety     Do you feel physically and emotionally safe where you currently live?: Yes     Within the past 12 months, have you been hit, slapped, kicked or otherwise physically hurt by someone?: No     Within the past 12 months, have you been humiliated or emotionally abused in other ways by your partner or ex-partner?: No   Housing Stability: Low Risk  (12/27/2023)    Housing Stability     Do you have housing? : Yes     Are you worried about losing your housing?: No       Outpatient Encounter Medications as of 2/17/2025   Medication Sig Dispense Refill    aspirin 81 MG tablet Take 81 mg by mouth daily      atorvastatin  (LIPITOR) 40 MG tablet Take 1 tablet by mouth once daily 90 tablet 3    blood glucose (NO BRAND SPECIFIED) test strip Use to test blood sugar 1 times daily.  Accu-chek guide me strips. 100 strip 3    blood glucose monitoring (SOFTCLIX) lancets Use to test blood sugar 1 times daily 100 each 5    ciclopirox (LOPROX) 0.77 % cream Apply topically at bedtime 90 g 6    doxycycline monohydrate (MONODOX) 100 MG capsule Take 1 capsule (100 mg) by mouth 2 times daily 60 capsule 6    glipiZIDE (GLUCOTROL XL) 10 MG 24 hr tablet Take 1 tablet (10 mg) by mouth daily. 90 tablet 3    hydrochlorothiazide (HYDRODIURIL) 25 MG tablet Take 1 tablet (25 mg) by mouth daily. 90 tablet 3    lisinopril (ZESTRIL) 40 MG tablet Take 1 tablet (40 mg) by mouth daily. 90 tablet 3    metFORMIN (GLUCOPHAGE-XR) 750 MG 24 hr tablet Take 1 tablet (750 mg) by mouth 2 times daily (with meals). 180 tablet 3    metoprolol succinate ER (TOPROL XL) 50 MG 24 hr tablet Take 1 tablet (50 mg) by mouth daily. 90 tablet 3    mometasone (ELOCON) 0.1 % external cream Apply topically daily To the back 60 g 6    Semaglutide (RYBELSUS) 14 MG tablet Take 14 mg by mouth daily. 90 tablet 3    sildenafil (VIAGRA) 100 MG cap/tab Take 1 tablet (100 mg) by mouth daily as needed for erectile dysfunction 3 tablet 11    tamsulosin (FLOMAX) 0.4 MG capsule Take 1 capsule (0.4 mg) by mouth daily. 90 capsule 3    vardenafil (LEVITRA) 20 MG tablet Take 1 tablet (20 mg) by mouth daily as needed for erectile dysfunction 5 tablet 11     No facility-administered encounter medications on file as of 2/17/2025.             O:   NAD, WDWN, Alert & Oriented, Mood & Affect wnl, Vitals stable   General appearance normal   Vitals stable   Alert, oriented and in no acute distress        Stuck on papules and brown macules on trunk and ext   Red papules on trunk  Flesh colored papules on trunk     The remainder of the full exam was normal; the following areas were examined:  conjunctiva/lids, ,  neck, peripheral vascular system, abdomen, lymph nodes, digits/nails, eccrine and apocrine glands, scalp/hair, face, neck, chest, abdomen, buttocks, back, RUE, LUE, RLE, LLE       Eyes: Conjunctivae/lids:Normal     ENT: Lips, mucosa: normal    MSK:Normal    Cardiovascular: peripheral edema none    Pulm: Breathing Normal    Lymph Nodes: No Head and Neck Lymphadenopathy     Neuro/Psych: Orientation:Alert and Orientedx3 ; Mood/Affect:normal       A/P:  1. Seborrheic keratosis, lentigo, angioma, dermal nevus, hx of non-melanoma skin cancer   It was a pleasure speaking to Cecilio Navarro today.  Previous clinic notes and pertinent laboratory tests were reviewed prior to Cecilio Navarro's visit.  Nature and genetics of benign skin lesions dicussed with patient.  Signs and Symptoms of skin cancer discussed with patient.  Patient encouraged to perform monthly skin exams.  UV precautions reviewed with patient.  Risks of non-melanoma skin cancer discussed with patient   Return to clinic 12 months

## 2025-02-17 NOTE — LETTER
2/17/2025      Cecilio Navarro  [address] [facility] [id number]      Dear Colleague,    Thank you for referring your patient, Cecilio Navarro, to the [facility]. Please see a copy of my visit note below.    Cecilio Navarro is an extremely pleasant 84 year old year old male patient here today for hx of non-melanoma skin cancer.  Patient has no other skin complaints today.  Remainder of the HPI, Meds, PMH, Allergies, FH, and SH was reviewed in chart.      Past Medical History:   Diagnosis Date     Actinic keratoses      Basal cell carcinoma      Squamous cell carcinoma of skin, unspecified      Type II or unspecified type diabetes mellitus without mention of complication, not stated as uncontrolled      Unspecified disorder of male genital organs      Unspecified essential hypertension        Past Surgical History:   Procedure Laterality Date     COLONOSCOPY  10/16/2013    Diverticulosis in sigmoid colon; repeat in 5 years; Surgeon: Garry Ambrose MD;  Location: [facility]     HC REMOVE TONSILS/ADENOIDS,<11 Y/O          Family History   Problem Relation Age of Onset     Hypertension Mother      Cerebrovascular Disease Mother         mentally affected     Cancer - colorectal Father         mets to liver  age74     Gastrointestinal Disease Brother         multilple colon polyps     Hypertension Brother      Gastrointestinal Disease Sister         reflux     Obesity Son      Allergies Daughter      Arthritis Brother      Melanoma No family hx of        Social History     Socioeconomic History     Marital status:      Spouse name: Not on file     Number of children: Not on file     Years of education: Not on file     Highest education level: Not on file   Occupational History     Not on file   Tobacco Use     Smoking status: Never     Smokeless tobacco: Never   Vaping Use     Vaping status: Never Used   Substance and Sexual Activity     Alcohol use: Yes     Comment: occasional  social     Drug use: No     Sexual activity: Yes     Partners: Female   Other Topics Concern      Service No     Blood Transfusions No     Caffeine Concern Yes     Comment: 1-2 day     Occupational Exposure No     Hobby Hazards Yes     Comment: [employer]     Sleep Concern No     Stress Concern No     Weight Concern No     Special Diet Yes     Comment: Diabetic and low fat     Back Care No     Exercise Yes     Bike Helmet Not Asked     Seat Belt Yes     Self-Exams Yes     Comment: check blood sugars     Parent/sibling w/ CABG, MI or angioplasty before 65F 55M? Not Asked   Social History Narrative     Not on file     Social Drivers of Health     Financial Resource Strain: Low Risk  (12/27/2023)    Financial Resource Strain      Within the past 12 months, have you or your family members you live with been unable to get utilities (heat, electricity) when it was really needed?: No   Food Insecurity: Low Risk  (12/27/2023)    Food Insecurity      Within the past 12 months, did you worry that your food would run out before you got money to buy more?: No      Within the past 12 months, did the food you bought just not last and you didn t have money to get more?: No   Transportation Needs: Low Risk  (12/27/2023)    Transportation Needs      Within the past 12 months, has lack of transportation kept you from medical appointments, getting your medicines, non-medical meetings or appointments, work, or from getting things that you need?: No   Physical Activity: Not on file   Stress: Not on file   Social Connections: Not on file   Interpersonal Safety: Low Risk  (12/11/2024)    Interpersonal Safety      Do you feel physically and emotionally safe where you currently live?: Yes      Within the past 12 months, have you been hit, slapped, kicked or otherwise physically hurt by someone?: No      Within the past 12 months, have you been humiliated or emotionally abused in other ways by your partner or ex-partner?: No   Housing  Stability: Low Risk  (12/27/2023)    Housing Stability      Do you have housing? : Yes      Are you worried about losing your housing?: No       Outpatient Encounter Medications as of 2/17/2025   Medication Sig Dispense Refill     aspirin 81 MG tablet Take 81 mg by mouth daily       atorvastatin (LIPITOR) 40 MG tablet Take 1 tablet by mouth once daily 90 tablet 3     blood glucose (NO BRAND SPECIFIED) test strip Use to test blood sugar 1 times daily.  Accu-chek guide me strips. 100 strip 3     blood glucose monitoring (SOFTCLIX) lancets Use to test blood sugar 1 times daily 100 each 5     ciclopirox (LOPROX) 0.77 % cream Apply topically at bedtime 90 g 6     doxycycline monohydrate (MONODOX) 100 MG capsule Take 1 capsule (100 mg) by mouth 2 times daily 60 capsule 6     glipiZIDE (GLUCOTROL XL) 10 MG 24 hr tablet Take 1 tablet (10 mg) by mouth daily. 90 tablet 3     hydrochlorothiazide (HYDRODIURIL) 25 MG tablet Take 1 tablet (25 mg) by mouth daily. 90 tablet 3     lisinopril (ZESTRIL) 40 MG tablet Take 1 tablet (40 mg) by mouth daily. 90 tablet 3     metFORMIN (GLUCOPHAGE-XR) 750 MG 24 hr tablet Take 1 tablet (750 mg) by mouth 2 times daily (with meals). 180 tablet 3     metoprolol succinate ER (TOPROL XL) 50 MG 24 hr tablet Take 1 tablet (50 mg) by mouth daily. 90 tablet 3     mometasone (ELOCON) 0.1 % external cream Apply topically daily To the back 60 g 6     Semaglutide (RYBELSUS) 14 MG tablet Take 14 mg by mouth daily. 90 tablet 3     sildenafil (VIAGRA) 100 MG cap/tab Take 1 tablet (100 mg) by mouth daily as needed for erectile dysfunction 3 tablet 11     tamsulosin (FLOMAX) 0.4 MG capsule Take 1 capsule (0.4 mg) by mouth daily. 90 capsule 3     vardenafil (LEVITRA) 20 MG tablet Take 1 tablet (20 mg) by mouth daily as needed for erectile dysfunction 5 tablet 11     No facility-administered encounter medications on file as of 2/17/2025.             O:   NAD, WDWN, Alert & Oriented, Mood & Affect wnl, Vitals  stable   General appearance normal   Vitals stable   Alert, oriented and in no acute distress        Stuck on papules and brown macules on trunk and ext   Red papules on trunk  Flesh colored papules on trunk     The remainder of the full exam was normal; the following areas were examined:  conjunctiva/lids, , neck, peripheral vascular system, abdomen, lymph nodes, digits/nails, eccrine and apocrine glands, scalp/hair, face, neck, chest, abdomen, buttocks, back, RUE, LUE, RLE, LLE       Eyes: Conjunctivae/lids:Normal     ENT: Lips, mucosa: normal    MSK:Normal    Cardiovascular: peripheral edema none    Pulm: Breathing Normal    Lymph Nodes: No Head and Neck Lymphadenopathy     Neuro/Psych: Orientation:Alert and Orientedx3 ; Mood/Affect:normal       A/P:  1. Seborrheic keratosis, lentigo, angioma, dermal nevus, hx of non-melanoma skin cancer   It was a pleasure speaking to Cecilio Navarro today.  Previous clinic notes and pertinent laboratory tests were reviewed prior to Cecilio Navarro's visit.  Nature and genetics of benign skin lesions dicussed with patient.  Signs and Symptoms of skin cancer discussed with patient.  Patient encouraged to perform monthly skin exams.  UV precautions reviewed with patient.  Risks of non-melanoma skin cancer discussed with patient   Return to clinic 12 months      Again, thank you for allowing me to participate in the care of your patient.        Sincerely,        Tejinder Jarvis MD    Electronically signed

## 2025-03-13 ENCOUNTER — TELEPHONE (OUTPATIENT)
Dept: FAMILY MEDICINE | Facility: CLINIC | Age: 85
End: 2025-03-13
Payer: COMMERCIAL

## 2025-03-13 NOTE — TELEPHONE ENCOUNTER
Cecilio has been approved to the  assistance program for [employer]        Medication received in clinic.     Call placed to patient, patient notified.       Juliane NORIEGA  Station

## 2025-04-15 DIAGNOSIS — L21.9 DERMATITIS, SEBORRHEIC: ICD-10-CM

## 2025-04-15 DIAGNOSIS — Z85.828 HISTORY OF SKIN CANCER: ICD-10-CM

## 2025-04-15 DIAGNOSIS — L81.4 LENTIGO: ICD-10-CM

## 2025-04-15 DIAGNOSIS — L57.0 AK (ACTINIC KERATOSIS): ICD-10-CM

## 2025-04-15 DIAGNOSIS — L73.9 FOLLICULITIS: ICD-10-CM

## 2025-04-15 DIAGNOSIS — D18.01 ANGIOMA OF SKIN: ICD-10-CM

## 2025-04-15 DIAGNOSIS — L82.1 SEBORRHEIC KERATOSES: ICD-10-CM

## 2025-04-15 DIAGNOSIS — D23.9 DERMAL NEVUS: ICD-10-CM

## 2025-04-15 RX ORDER — DOXYCYCLINE 100 MG/1
100 CAPSULE ORAL 2 TIMES DAILY
Qty: 60 CAPSULE | Refills: 6 | Status: SHIPPED | OUTPATIENT
Start: 2025-04-15

## 2025-04-15 NOTE — TELEPHONE ENCOUNTER
Requested Prescriptions   Pending Prescriptions Disp Refills    doxycycline monohydrate (MONODOX) 100 MG capsule 60 capsule 6     Sig: Take 1 capsule (100 mg) by mouth 2 times daily.       There is no refill protocol information for this order          Last office visit: 2/17/2025 ; last virtual visit: 4/22/2024 with prescribing provider:  Tejinder Jarvis      Future Office Visit:        Mandie Brothers   Clinic Station    [facility]  [phone]

## 2025-04-22 ENCOUNTER — TELEPHONE (OUTPATIENT)
Dept: DERMATOLOGY | Facility: CLINIC | Age: 85
End: 2025-04-22
Payer: COMMERCIAL

## 2025-04-22 NOTE — TELEPHONE ENCOUNTER
[facility] Call Center    Phone Message    May a detailed message be left on voicemail: yes     Reason for Call: Patient saw his dentist and dentist noticed a lesion on patient's lip. Spouse Shanice called to schedule with Simona but 1st avail is 11/11 and she thinks he should be seen sooner - please call back [phone] Thank you    Action Taken: Other: [facility]    Travel Screening: Not Applicable     Date of Service:

## 2025-05-06 ENCOUNTER — OFFICE VISIT (OUTPATIENT)
Dept: FAMILY MEDICINE | Facility: CLINIC | Age: 85
End: 2025-05-06
Payer: COMMERCIAL

## 2025-05-06 VITALS
WEIGHT: 185.8 LBS | OXYGEN SATURATION: 98 % | BODY MASS INDEX: 29.16 KG/M2 | HEART RATE: 77 BPM | RESPIRATION RATE: 20 BRPM | HEIGHT: 67 IN | DIASTOLIC BLOOD PRESSURE: 72 MMHG | TEMPERATURE: 97.1 F | SYSTOLIC BLOOD PRESSURE: 112 MMHG

## 2025-05-06 DIAGNOSIS — R22.0 LUMP ON FACE: Primary | ICD-10-CM

## 2025-05-06 PROCEDURE — 3078F DIAST BP <80 MM HG: CPT | Performed by: FAMILY MEDICINE

## 2025-05-06 PROCEDURE — 99213 OFFICE O/P EST LOW 20 MIN: CPT | Performed by: FAMILY MEDICINE

## 2025-05-06 PROCEDURE — 3074F SYST BP LT 130 MM HG: CPT | Performed by: FAMILY MEDICINE

## 2025-05-06 PROCEDURE — 90480 ADMN SARSCOV2 VAC 1/ONLY CMP: CPT | Performed by: FAMILY MEDICINE

## 2025-05-06 PROCEDURE — 91320 SARSCV2 VAC 30MCG TRS-SUC IM: CPT | Performed by: FAMILY MEDICINE

## 2025-05-06 NOTE — PROGRESS NOTES
"  Assessment & Plan     Lump on face  Differential discussed in detail and suspect forehead lesion secondary to lipoma.  Reassurance provided.  Patient has an appointment with dermatologist next week.  All questions answered.      Chelsey Hernandes is a 84 year old, presenting for the following health issues:  Mass        5/6/2025     9:29 AM   Additional Questions   Roomed by Siria VAN LPN   Accompanied by self     HPI      Lump was noted on forehead about a year ago maybe he is unsure if it has changed or not.       Review of Systems  Constitutional, HEENT, cardiovascular, pulmonary, gi and gu systems are negative, except as otherwise noted.      Objective    /72   Pulse 77   Temp 97.1  F (36.2  C) (Tympanic)   Resp 20   Ht 1.689 m (5' 6.5\")   Wt 84.3 kg (185 lb 12.8 oz)   SpO2 98%   BMI 29.54 kg/m    Body mass index is 29.54 kg/m .  Physical Exam   GENERAL: alert and no distress  EYES: Eyes grossly normal to inspection, PERRL and conjunctivae and sclerae normal  HENT: normal cephalic/atraumatic, nose and mouth without ulcers or lesions, oropharynx clear, and oral mucous membranes moist  NECK: no adenopathy, no asymmetry, masses, or scars  RESP: no wheezes   MS: no gross musculoskeletal defects noted, no edema  SKIN: small subcutaneous lump involving forehead, soft in consistency, no punctum, tenderness, warmth or fluctuance noted  NEURO: Normal strength and tone, mentation intact and speech normal  PSYCH: mentation appears normal, affect normal/bright      Signed Electronically by: Larry Philip MD    "

## 2025-05-12 ENCOUNTER — OFFICE VISIT (OUTPATIENT)
Dept: DERMATOLOGY | Facility: CLINIC | Age: 85
End: 2025-05-12
Payer: COMMERCIAL

## 2025-05-12 DIAGNOSIS — L82.1 SEBORRHEIC KERATOSES: ICD-10-CM

## 2025-05-12 DIAGNOSIS — D17.0 LIPOMA OF FOREHEAD: ICD-10-CM

## 2025-05-12 DIAGNOSIS — D23.9 DERMAL NEVUS: Primary | ICD-10-CM

## 2025-05-12 DIAGNOSIS — Z85.828 HISTORY OF SKIN CANCER: ICD-10-CM

## 2025-05-12 DIAGNOSIS — D48.5 NEOPLASM OF UNCERTAIN BEHAVIOR OF SKIN: ICD-10-CM

## 2025-05-12 DIAGNOSIS — L81.4 LENTIGO: ICD-10-CM

## 2025-05-12 DIAGNOSIS — L82.0 INFLAMED SEBORRHEIC KERATOSIS: ICD-10-CM

## 2025-05-12 DIAGNOSIS — D18.01 ANGIOMA OF SKIN: ICD-10-CM

## 2025-05-12 PROCEDURE — 17110 DESTRUCTION B9 LES UP TO 14: CPT | Performed by: DERMATOLOGY

## 2025-05-12 PROCEDURE — 88305 TISSUE EXAM BY PATHOLOGIST: CPT | Performed by: DERMATOLOGY

## 2025-05-12 PROCEDURE — 99213 OFFICE O/P EST LOW 20 MIN: CPT | Mod: 25 | Performed by: DERMATOLOGY

## 2025-05-12 PROCEDURE — 1126F AMNT PAIN NOTED NONE PRSNT: CPT | Performed by: DERMATOLOGY

## 2025-05-12 PROCEDURE — 40490 BIOPSY OF LIP: CPT | Performed by: DERMATOLOGY

## 2025-05-12 ASSESSMENT — PAIN SCALES - GENERAL: PAINLEVEL_OUTOF10: NO PAIN (0)

## 2025-05-12 NOTE — PATIENT INSTRUCTIONS
Wound Care Instructions   FOR SUPERFICIAL WOUNDS   [facility] [phone]    Left Lower Lip    AFTER 24 HOURS YOU SHOULD REMOVE THE BANDAGE AND BEGIN DAILY DRESSING CHANGES AS FOLLOWS:     1) Remove Dressing.     2) Clean and dry the area with tap water using a Q-tip or sterile gauze pad.     3) Apply Vaseline, Aquaphor, Polysporin ointment or Bacitracin ointment over entire wound.  Do NOT use Neosporin ointment.     4) Cover the wound with a band-aid, or a sterile non-stick gauze pad and micropore paper tape      REPEAT THESE INSTRUCTIONS AT LEAST ONCE A DAY UNTIL THE WOUND HAS COMPLETELY HEALED.    It is an old wives tale that a wound heals better when it is exposed to air and allowed to dry out. The wound will heal faster with a better cosmetic result if it is kept moist with ointment and covered with a bandage.    **Do not let the wound dry out.**      Supplies Needed:      *Cotton tipped applicators (Q-tips)    *Polysporin Ointment or Bacitracin Ointment (NOT NEOSPORIN)    *Band-aids or non-stick gauze pads and micropore paper tape.      PATIENT INFORMATION:    During the healing process you will notice a number of changes. All wounds develop a small halo of redness surrounding the wound.  This means healing is occurring. Severe itching with extensive redness usually indicates sensitivity to the ointment or bandage tape used to dress the wound.  You should call our office if this develops.      Swelling  and/or discoloration around your surgical site is common, particularly when performed around the eye.    All wounds normally drain.  The larger the wound the more drainage there will be.  After 7-10 days, you will notice the wound beginning to shrink and new skin will begin to grow.  The wound is healed when you can see skin has formed over the entire area.  A healed wound has a healthy, shiny look to the surface and is red to dark pink in color to normalize.  Wounds  may take approximately 4-6 weeks to heal.  Larger wounds may take 6-8 weeks.  After the wound is healed you may discontinue dressing changes.    You may experience a sensation of tightness as your wound heals. This is normal and will gradually subside.    Your healed wound may be sensitive to temperature changes. This sensitivity improves with time, but if you re having a lot of discomfort, try to avoid temperature extremes.    Patients frequently experience itching after their wound appears to have healed because of the continue healing under the skin.  Plain Vaseline will help relieve the itching.    POSSIBLE COMPLICATIONS    BLEEDING:    Leave the bandage in place.  Use tightly rolled up gauze or a cloth to apply direct pressure over the bandage for 30  minutes.  Reapply pressure for an additional 30 minutes if necessary  Use additional gauze and tape to maintain pressure once the bleeding has stopped.       Proper skin care from [facility]:    -Eliminate harsh soaps as they strip the natural oils from the skin, often resulting in dry itchy skin ( i.e. Dial, Zest, [place] Spring)  -Use mild soaps such as Cetaphil or Dove Sensitive Skin in the shower. You do not need to use soap on arms, legs, and trunk every time you shower unless visibly soiled.   -Avoid hot or cold showers.  -After showering, lightly dry off and apply moisturizing within 2-3 minutes. This will help trap moisture in the skin.   -Aggressive use of a moisturizer at least 1-2 times a day to the entire body (including -Vanicream, Cetaphil, Aquaphor or Cerave) and moisturize hands after every washing.  -We recommend using moisturizers that come in a tub that needs to be scooped out, not a pump. This has more of an oil base. It will hold moisture in your skin much better than a water base moisturizer. The above recommended are non-pore clogging.      Wear a sunscreen with at least SPF 30 on your face, ears, neck and V of the chest daily. Wear  sunscreen on other areas of the body if those areas are exposed to the sun throughout the day. Sunscreens can contain physical and/or chemical blockers. Physical blockers are less likely to clog pores, these include zinc oxide and titanium dioxide. Reapply every two hour and after swimming.     Sunscreen examples: https://www.ewg.org/sunscreen/    UV radiation  UVA radiation remains constant throughout the day and throughout the year. It is a longer wavelength than UVB and therefore penetrates deeper into the skin leading to immediate and delayed tanning, photoaging, and skin cancer. 70-80% of UVA and UVB radiation occurs between the hours of 10am-2pm.  UVB radiation  UVB radiation causes the most harmful effects and is more significant during the summer months. However, snow and ice can reflect UVB radiation leading to skin damage during the winter months as well. UVB radiation is responsible for tanning, burning, inflammation, delayed erythema (pinkness), pigmentation (brown spots), and skin cancer.     I recommend self monthly full body exams and yearly full body exams with a dermatology provider. If you develop a new or changing lesion please follow up for examination. Most skin cancers are pink and scaly or pink and pearly. However, we do see blue/brown/black skin cancers.  Consider the ABCDEs of melanoma when giving yourself your monthly full body exam ( don't forget the groin, buttocks, feet, toes, etc). A-asymmetry, B-borders, C-color, D-diameter, E-elevation or evolving. If you see any of these changes please follow up in clinic. If you cannot see your back I recommend purchasing a hand held mirror to use with a larger wall mirror.       Checking for Skin Cancer  You can find cancer early by checking your skin each month. There are 3 kinds of skin cancer. They are melanoma, basal cell carcinoma, and squamous cell carcinoma. Doing monthly skin checks is the best way to find new marks or skin changes. Follow  the instructions below for checking your skin.   The ABCDEs of checking moles for melanoma   Check your moles or growths for signs of melanoma using ABCDE:   Asymmetry: the sides of the mole or growth don t match  Border: the edges are ragged, notched, or blurred  Color: the color within the mole or growth varies  Diameter: the mole or growth is larger than 6 mm (size of a pencil eraser)  Evolving: the size, shape, or color of the mole or growth is changing (evolving is not shown in the images below)    Checking for other types of skin cancer  Basal cell carcinoma or squamous cell carcinoma have symptoms such as:     A spot or mole that looks different from all other marks on your skin  Changes in how an area feels, such as itching, tenderness, or pain  Changes in the skin's surface, such as oozing, bleeding, or scaliness  A sore that does not heal  New swelling or redness beyond the border of a mole    Who s at risk?  Anyone can get skin cancer. But you are at greater risk if you have:   Fair skin, light-colored hair, or light-colored eyes  Many moles or abnormal moles on your skin  A history of sunburns from sunlight or tanning beds  A family history of skin cancer  A history of exposure to radiation or chemicals  A weakened immune system  If you have had skin cancer in the past, you are at risk for recurring skin cancer.   How to check your skin  Do your monthly skin checkups in front of a full-length mirror. Check all parts of your body, including your:   Head (ears, face, neck, and scalp)  Torso (front, back, and sides)  Arms (tops, undersides, upper, and lower armpits)  Hands (palms, backs, and fingers, including under the nails)  Buttocks and genitals  Legs (front, back, and sides)  Feet (tops, soles, toes, including under the nails, and between toes)  If you have a lot of moles, take digital photos of them each month. Make sure to take photos both up close and from a distance. These can help you see if any  moles change over time.   Most skin changes are not cancer. But if you see any changes in your skin, call your doctor right away. Only he or she can diagnose a problem. If you have skin cancer, seeing your doctor can be the first step toward getting the treatment that could save your life.   Brian last reviewed this educational content on 4/1/2019 2000-2020 [employer]. [address] [place] [address], [place] [address]. All rights reserved. This information is not intended as a substitute for professional medical care. Always follow your healthcare professional's instructions.       When should I call my doctor?  If you are worsening or not improving, please, contact us or seek urgent care as noted below.     Who should I call with questions (adults)?    [facility] [phone]  For urgent needs outside of business hours call the [facility] at [phone] and ask for the dermatology resident on call to be paged  If this is a medical emergency and you are unable to reach an ER, Call [phone]      If you need a prescription refill, please contact your pharmacy. Refills are approved or denied by our Physicians during normal business hours, Monday through Friday.  Per office policy, refills will not be granted if you have not been seen within the past year (or sooner depending on the condition).

## 2025-05-12 NOTE — NURSING NOTE
Chief Complaint   Patient presents with    Derm Problem     Spot on left lower lip        There were no vitals filed for this visit.  Wt Readings from Last 1 Encounters:   05/06/25 84.3 kg (185 lb 12.8 oz)       Aleisha Crocker LPN .................5/12/2025

## 2025-05-12 NOTE — PROGRESS NOTES
Cecilio Navarro is an extremely pleasant 84 year old year old male patient here today for spot on lip, forehead and face.  Patient has no other skin complaints today.  Remainder of the HPI, Meds, PMH, Allergies, FH, and SH was reviewed in chart.      Past Medical History:   Diagnosis Date    Actinic keratoses     Basal cell carcinoma     Squamous cell carcinoma of skin, unspecified     Type II or unspecified type diabetes mellitus without mention of complication, not stated as uncontrolled     Unspecified disorder of male genital organs     Unspecified essential hypertension        Past Surgical History:   Procedure Laterality Date    COLONOSCOPY  10/16/2013    Diverticulosis in sigmoid colon; repeat in 5 years; Surgeon: Garry Ambrose MD;  Location: [facility]    HC REMOVE TONSILS/ADENOIDS,<13 Y/O          Family History   Problem Relation Age of Onset    Hypertension Mother     Cerebrovascular Disease Mother         mentally affected    Cancer - colorectal Father         mets to liver  age76    Gastrointestinal Disease Brother         multilple colon polyps    Hypertension Brother     Gastrointestinal Disease Sister         reflux    Obesity Son     Allergies Daughter     Arthritis Brother     Melanoma No family hx of        Social History     Socioeconomic History    Marital status:      Spouse name: Not on file    Number of children: Not on file    Years of education: Not on file    Highest education level: Not on file   Occupational History    Not on file   Tobacco Use    Smoking status: Never    Smokeless tobacco: Never   Vaping Use    Vaping status: Never Used   Substance and Sexual Activity    Alcohol use: Yes     Comment: occasional social    Drug use: No    Sexual activity: Yes     Partners: Female   Other Topics Concern     Service No    Blood Transfusions No    Caffeine Concern Yes     Comment: 1-2 day    Occupational Exposure No    Hobby Hazards Yes     Comment: [employer]    Sleep Concern  No    Stress Concern No    Weight Concern No    Special Diet Yes     Comment: Diabetic and low fat    Back Care No    Exercise Yes    Bike Helmet Not Asked    Seat Belt Yes    Self-Exams Yes     Comment: check blood sugars    Parent/sibling w/ CABG, MI or angioplasty before 65F 55M? Not Asked   Social History Narrative    Not on file     Social Drivers of Health     Financial Resource Strain: Low Risk  (12/27/2023)    Financial Resource Strain     Within the past 12 months, have you or your family members you live with been unable to get utilities (heat, electricity) when it was really needed?: No   Food Insecurity: Low Risk  (12/27/2023)    Food Insecurity     Within the past 12 months, did you worry that your food would run out before you got money to buy more?: No     Within the past 12 months, did the food you bought just not last and you didn t have money to get more?: No   Transportation Needs: Low Risk  (12/27/2023)    Transportation Needs     Within the past 12 months, has lack of transportation kept you from medical appointments, getting your medicines, non-medical meetings or appointments, work, or from getting things that you need?: No   Physical Activity: Not on file   Stress: Not on file   Social Connections: Not on file   Interpersonal Safety: Low Risk  (12/11/2024)    Interpersonal Safety     Do you feel physically and emotionally safe where you currently live?: Yes     Within the past 12 months, have you been hit, slapped, kicked or otherwise physically hurt by someone?: No     Within the past 12 months, have you been humiliated or emotionally abused in other ways by your partner or ex-partner?: No   Housing Stability: Low Risk  (12/27/2023)    Housing Stability     Do you have housing? : Yes     Are you worried about losing your housing?: No       Outpatient Encounter Medications as of 5/12/2025   Medication Sig Dispense Refill    aspirin 81 MG tablet Take 81 mg by mouth daily      atorvastatin  (LIPITOR) 40 MG tablet Take 1 tablet by mouth once daily 90 tablet 3    blood glucose (NO BRAND SPECIFIED) test strip Use to test blood sugar 1 times daily.  Accu-chek guide me strips. 100 strip 3    blood glucose monitoring (SOFTCLIX) lancets Use to test blood sugar 1 times daily 100 each 5    ciclopirox (LOPROX) 0.77 % cream Apply topically at bedtime 90 g 6    doxycycline monohydrate (MONODOX) 100 MG capsule Take 1 capsule (100 mg) by mouth 2 times daily. 60 capsule 6    glipiZIDE (GLUCOTROL XL) 10 MG 24 hr tablet Take 1 tablet (10 mg) by mouth daily. 90 tablet 3    hydrochlorothiazide (HYDRODIURIL) 25 MG tablet Take 1 tablet (25 mg) by mouth daily. 90 tablet 3    lisinopril (ZESTRIL) 40 MG tablet Take 1 tablet (40 mg) by mouth daily. 90 tablet 3    metFORMIN (GLUCOPHAGE-XR) 750 MG 24 hr tablet Take 1 tablet (750 mg) by mouth 2 times daily (with meals). 180 tablet 3    metoprolol succinate ER (TOPROL XL) 50 MG 24 hr tablet Take 1 tablet (50 mg) by mouth daily. 90 tablet 3    mometasone (ELOCON) 0.1 % external cream Apply topically daily To the back 60 g 6    Multiple Vitamins-Minerals (ONE DAILY MENS 50+ MULTIVIT PO) Take 1 tablet by mouth daily.      Semaglutide (RYBELSUS) 14 MG tablet Take 14 mg by mouth daily. 90 tablet 3    sildenafil (VIAGRA) 100 MG cap/tab Take 1 tablet (100 mg) by mouth daily as needed for erectile dysfunction 3 tablet 11    tamsulosin (FLOMAX) 0.4 MG capsule Take 1 capsule (0.4 mg) by mouth daily. 90 capsule 3    vardenafil (LEVITRA) 20 MG tablet Take 1 tablet (20 mg) by mouth daily as needed for erectile dysfunction 5 tablet 11     No facility-administered encounter medications on file as of 5/12/2025.             O:   NAD, WDWN, Alert & Oriented, Mood & Affect wnl, Vitals stable   General appearance normal   Vitals stable   Alert, oriented and in no acute distress     L lower mucosal lip ulcerated 6mm   R forehead inflamed seborrheic keratosis   Movable soft nodule glabella    Stuck on  papules and brown macules on face  Red papules on face  Flesh colored papules on face      Eyes: Conjunctivae/lids:Normal     ENT: Lips, mucosa: normal    MSK:Normal    Cardiovascular: peripheral edema none    Pulm: Breathing Normal    Neuro/Psych: Orientation:Alert and Orientedx3 ; Mood/Affect:normal       A/P:  1. Seborrheic keratosis, lentigo, angioma, dermal nevus, hx of non-melanoma skin cancer, lipoma  2. Forehead inflamed seborrheic keratosis   LN2:  Treated with LN2 for 5s for 1-2 cycles. Warned risks of blistering, pain, pigment change, scarring, and incomplete resolution.  Advised patient to return if lesions do not completely resolve.  Wound care sheet given.  3. 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  No complications and routine wound care. Patient told to call our office in 1-2 weeks for result.       It was a pleasure speaking to Cecilio Navarro today.  Previous clinic notes and pertinent laboratory tests were reviewed prior to Cecilio Navarro's visit.  Signs and Symptoms of skin cancer discussed with patient.  Patient encouraged to perform monthly skin exams.  UV precautions reviewed with patient.  Risks of non-melanoma skin cancer discussed with patient   Return to clinic pending path

## 2025-05-12 NOTE — LETTER
5/12/2025      Cecilio Navarro  [address] [facility] [id number]      Dear Colleague,    Thank you for referring your patient, Cecilio Navarro, to the [facility]. Please see a copy of my visit note below.    Cecilio Navarro is an extremely pleasant 84 year old year old male patient here today for spot on lip, forehead and face.  Patient has no other skin complaints today.  Remainder of the HPI, Meds, PMH, Allergies, FH, and SH was reviewed in chart.      Past Medical History:   Diagnosis Date     Actinic keratoses      Basal cell carcinoma      Squamous cell carcinoma of skin, unspecified      Type II or unspecified type diabetes mellitus without mention of complication, not stated as uncontrolled      Unspecified disorder of male genital organs      Unspecified essential hypertension        Past Surgical History:   Procedure Laterality Date     COLONOSCOPY  10/16/2013    Diverticulosis in sigmoid colon; repeat in 5 years; Surgeon: Garry Ambrose MD;  Location: [facility]     HC REMOVE TONSILS/ADENOIDS,<11 Y/O          Family History   Problem Relation Age of Onset     Hypertension Mother      Cerebrovascular Disease Mother         mentally affected     Cancer - colorectal Father         mets to liver  age76     Gastrointestinal Disease Brother         multilple colon polyps     Hypertension Brother      Gastrointestinal Disease Sister         reflux     Obesity Son      Allergies Daughter      Arthritis Brother      Melanoma No family hx of        Social History     Socioeconomic History     Marital status:      Spouse name: Not on file     Number of children: Not on file     Years of education: Not on file     Highest education level: Not on file   Occupational History     Not on file   Tobacco Use     Smoking status: Never     Smokeless tobacco: Never   Vaping Use     Vaping status: Never Used   Substance and Sexual Activity     Alcohol use: Yes     Comment: occasional  social     Drug use: No     Sexual activity: Yes     Partners: Female   Other Topics Concern      Service No     Blood Transfusions No     Caffeine Concern Yes     Comment: 1-2 day     Occupational Exposure No     Hobby Hazards Yes     Comment: [employer]     Sleep Concern No     Stress Concern No     Weight Concern No     Special Diet Yes     Comment: Diabetic and low fat     Back Care No     Exercise Yes     Bike Helmet Not Asked     Seat Belt Yes     Self-Exams Yes     Comment: check blood sugars     Parent/sibling w/ CABG, MI or angioplasty before 65F 55M? Not Asked   Social History Narrative     Not on file     Social Drivers of Health     Financial Resource Strain: Low Risk  (12/27/2023)    Financial Resource Strain      Within the past 12 months, have you or your family members you live with been unable to get utilities (heat, electricity) when it was really needed?: No   Food Insecurity: Low Risk  (12/27/2023)    Food Insecurity      Within the past 12 months, did you worry that your food would run out before you got money to buy more?: No      Within the past 12 months, did the food you bought just not last and you didn t have money to get more?: No   Transportation Needs: Low Risk  (12/27/2023)    Transportation Needs      Within the past 12 months, has lack of transportation kept you from medical appointments, getting your medicines, non-medical meetings or appointments, work, or from getting things that you need?: No   Physical Activity: Not on file   Stress: Not on file   Social Connections: Not on file   Interpersonal Safety: Low Risk  (12/11/2024)    Interpersonal Safety      Do you feel physically and emotionally safe where you currently live?: Yes      Within the past 12 months, have you been hit, slapped, kicked or otherwise physically hurt by someone?: No      Within the past 12 months, have you been humiliated or emotionally abused in other ways by your partner or ex-partner?: No   Housing  Stability: Low Risk  (12/27/2023)    Housing Stability      Do you have housing? : Yes      Are you worried about losing your housing?: No       Outpatient Encounter Medications as of 5/12/2025   Medication Sig Dispense Refill     aspirin 81 MG tablet Take 81 mg by mouth daily       atorvastatin (LIPITOR) 40 MG tablet Take 1 tablet by mouth once daily 90 tablet 3     blood glucose (NO BRAND SPECIFIED) test strip Use to test blood sugar 1 times daily.  Accu-chek guide me strips. 100 strip 3     blood glucose monitoring (SOFTCLIX) lancets Use to test blood sugar 1 times daily 100 each 5     ciclopirox (LOPROX) 0.77 % cream Apply topically at bedtime 90 g 6     doxycycline monohydrate (MONODOX) 100 MG capsule Take 1 capsule (100 mg) by mouth 2 times daily. 60 capsule 6     glipiZIDE (GLUCOTROL XL) 10 MG 24 hr tablet Take 1 tablet (10 mg) by mouth daily. 90 tablet 3     hydrochlorothiazide (HYDRODIURIL) 25 MG tablet Take 1 tablet (25 mg) by mouth daily. 90 tablet 3     lisinopril (ZESTRIL) 40 MG tablet Take 1 tablet (40 mg) by mouth daily. 90 tablet 3     metFORMIN (GLUCOPHAGE-XR) 750 MG 24 hr tablet Take 1 tablet (750 mg) by mouth 2 times daily (with meals). 180 tablet 3     metoprolol succinate ER (TOPROL XL) 50 MG 24 hr tablet Take 1 tablet (50 mg) by mouth daily. 90 tablet 3     mometasone (ELOCON) 0.1 % external cream Apply topically daily To the back 60 g 6     Multiple Vitamins-Minerals (ONE DAILY MENS 50+ MULTIVIT PO) Take 1 tablet by mouth daily.       Semaglutide (RYBELSUS) 14 MG tablet Take 14 mg by mouth daily. 90 tablet 3     sildenafil (VIAGRA) 100 MG cap/tab Take 1 tablet (100 mg) by mouth daily as needed for erectile dysfunction 3 tablet 11     tamsulosin (FLOMAX) 0.4 MG capsule Take 1 capsule (0.4 mg) by mouth daily. 90 capsule 3     vardenafil (LEVITRA) 20 MG tablet Take 1 tablet (20 mg) by mouth daily as needed for erectile dysfunction 5 tablet 11     No facility-administered encounter medications on  file as of 5/12/2025.             O:   NAD, WDWN, Alert & Oriented, Mood & Affect wnl, Vitals stable   General appearance normal   Vitals stable   Alert, oriented and in no acute distress     L lower mucosal lip ulcerated 6mm   R forehead inflamed seborrheic keratosis   Movable soft nodule glabella    Stuck on papules and brown macules on face  Red papules on face  Flesh colored papules on face      Eyes: Conjunctivae/lids:Normal     ENT: Lips, mucosa: normal    MSK:Normal    Cardiovascular: peripheral edema none    Pulm: Breathing Normal    Neuro/Psych: Orientation:Alert and Orientedx3 ; Mood/Affect:normal       A/P:  1. Seborrheic keratosis, lentigo, angioma, dermal nevus, hx of non-melanoma skin cancer, lipoma  2. Forehead inflamed seborrheic keratosis   LN2:  Treated with LN2 for 5s for 1-2 cycles. Warned risks of blistering, pain, pigment change, scarring, and incomplete resolution.  Advised patient to return if lesions do not completely resolve.  Wound care sheet given.  3. 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  No complications and routine wound care. Patient told to call our office in 1-2 weeks for result.       It was a pleasure speaking to Cecilio Navarro today.  Previous clinic notes and pertinent laboratory tests were reviewed prior to Cecilio Navarro's visit.  Signs and Symptoms of skin cancer discussed with patient.  Patient encouraged to perform monthly skin exams.  UV precautions reviewed with patient.  Risks of non-melanoma skin cancer discussed with patient   Return to clinic pending path       Again, thank you for allowing me to participate in the care of your patient.        Sincerely,        Tejinder Jarvis MD    Electronically signed

## 2025-05-14 LAB
PATH REPORT.COMMENTS IMP SPEC: NORMAL
PATH REPORT.COMMENTS IMP SPEC: NORMAL
PATH REPORT.FINAL DX SPEC: NORMAL
PATH REPORT.GROSS SPEC: NORMAL
PATH REPORT.MICROSCOPIC SPEC OTHER STN: NORMAL
PATH REPORT.RELEVANT HX SPEC: NORMAL

## 2025-05-16 NOTE — PROGRESS NOTES
Medication Therapy Management (MTM) Encounter    ASSESSMENT:                            Medication Adherence/Access: See below considerations.    Type 2 Diabetes: Patient is not meeting A1c goal of < 7.5%. Self monitoring of blood glucose is at goal of fasting  mg/dL. Upcoming PCP visit next month for diabetes recheck. Agree with stopping aspirin given age >70 and will remove from med list.    Hypertension: Patient is meeting blood pressure goal of < 130/80mmHg. Educated to administer thiazide diuretic in the morning to prevent overnight urination.    Hyperlipidemia: Stable.     BPH: Stable.    ED: Patient may benefit from trial of tadalafil as needed, however will check with PCP.    Rash: Need updated doxycycline prescription from dermatology to reflect current directions.     Supplements: Stable.    PLAN:                            Change hydrochlorothiazide to 25mg every morning. Skip dose tonight and take tomorrow morning.  Dr. Jarvis updated prescription for doxycycline 100mg at bedtime.  Soraida to discuss with Dr. Morales about tadalafil 10mg as needed for erectile dysfunction.  Stay off baby aspirin as directed by Dr. Morales.    Follow-up: Return in 22 days (on 6/12/2025) for Primary Care Provider Visit, Diabetes Recheck.    SUBJECTIVE/OBJECTIVE:                          Cecilio Navarro is a 84 year old male seen for an initial visit. He was referred to me from his [employer] insurance plan. Patient was accompanied by his wife Shanice.     Reason for visit: Comprehensive medication review, no specific concerns today.    Allergies/ADRs: Reviewed in chart  Past Medical History: Reviewed in chart  Tobacco: He reports that he has never smoked. He has never used smokeless tobacco.  Alcohol: 1 beverage / week    Medication Adherence/Access:   Patient uses pill box(es), sets up himself.  Patient takes medications 2 time(s) per day.   Per patient, misses medication 0 time(s) per week.   Medication barriers: none.   The  patient fills medications at [place]: NO, fills medications at [facility].    Type 2 Diabetes:  Rybelsus 14mg every morning right away - gets from assistance program.  Glipizide ER 10mg every morning with breakfast.  Metformin ER 750mg twice daily.  Patient reports no current medication side effects.  Blood sugar monitoring: periodically fasting AM; Ranges: (patient reported) 122 mg/dL on May 12th and 121 mg/dL on May 15th.   Current diabetes symptoms: none  Diet/Exercise: Lost about 40 pounds on Rybelsus. Also follows with [facility].  Eye exam: due soon - gave reminder today  No longer taking aspirin stopped by PCP. No known history of ASCVD.    Lab Results   Component Value Date    A1C 7.5 12/11/2024    A1C 6.9 12/27/2023    A1C 6.8 05/22/2023    A1C 6.5 09/28/2022    A1C 6.2 03/18/2022    A1C 7.0 05/10/2021    A1C 10.2 12/30/2020    A1C 9.8 09/16/2020    A1C 7.6 11/07/2019    A1C 7.9 05/20/2019     Lab Results   Component Value Date    UMALCR 11.80 12/11/2024     Hypertension:  Hydrochlorothiazide 25mg at bedtime.  Lisinopril 40mg every morning.  Metoprolol ER 50mg at bedtime.  Patient reports the following medication side effects: overnight urination 2 times nightly, needs to let senior dog out anyways.  Patient has a blood pressure machine w/ arm cuff, but isn't checking lately.    Last Comprehensive Metabolic Panel:  Lab Results   Component Value Date     12/11/2024    POTASSIUM 3.6 12/11/2024    CHLORIDE 98 12/11/2024    CO2 29 12/11/2024    ANIONGAP 13 12/11/2024     (H) 12/11/2024    BUN 14.7 12/11/2024    CR 0.95 12/11/2024    GFRESTIMATED 80 12/11/2024    YOAN 9.5 12/11/2024     Hyperlipidemia:  Atorvastatin 40mg at bedtime.  Patient reports no current medication side effects.    Recent Labs   Lab Test 12/11/24  1057 12/27/23  1210   CHOL 122 128   HDL 48 52   LDL 56 56   TRIG 92 101     BPH:  Tamsulosin 0.4mg at bedtime.  Patient feels urinary symptoms are improved.  Patient reports no  "current medication side effects.    ED:  No longer taking sildenafil or vardenafil, doses were 100mg and 20mg respectively. They didn't work and interested in trying tadalafil next.   Chart review shows PCP on 1/3/24 - \"I don't think cialis is a great choice for him\".    Rash:  Occasional use of mometasone 0.1% cream on back.  Doxycycline 100mg at bedtime - current prescription says 100mg twice daily, but he's only been taking once daily and feels works well.  Pimples are resolved and no skin concerns at this time.  Follows with dermatology Dr. Simona christy saw yesterday.  No longer using ciclopirox 0.77% cream.    Supplements:  Multivitamin 1 tablet daily.  No concerns today.    Today's Vitals: There were no vitals taken for this visit.    BP Readings from Last 1 Encounters:   05/06/25 112/72     Pulse Readings from Last 1 Encounters:   05/06/25 77     Wt Readings from Last 1 Encounters:   05/06/25 185 lb 12.8 oz (84.3 kg)     Ht Readings from Last 1 Encounters:   05/06/25 5' 6.5\" (1.689 m)     Estimated body mass index is 29.54 kg/m  as calculated from the following:    Height as of 5/6/25: 5' 6.5\" (1.689 m).    Weight as of 5/6/25: 185 lb 12.8 oz (84.3 kg).  ----------------    I spent 45 minutes with this patient today. All changes were made via collaborative practice agreement with Damion Morales MD.     A summary of these recommendations was sent via [employer].    Soraida Liu, PharmD, BCACP  Medication Therapy Management Pharmacist  [facility]    Telemedicine Visit Details  The patient's medications can be safely assessed via a telemedicine encounter.  Type of service:  Telephone visit  Originating Location (pt. Location): Home    Distant Location (provider location):  On-site  Start Time: 1000  End Time: 1045     Medication Therapy Recommendations  HTN, goal below 140/90   1 Current Medication: hydrochlorothiazide (HYDRODIURIL) 25 MG tablet   Current Medication Sig: Take 1 tablet " (25 mg) by mouth daily.   Rationale: Undesirable effect - Adverse medication event - Safety   Recommendation: Change Administration Time - hydrochlorothiazide 25 MG tablet - 1 tab QAM.   Status: Patient Agreed - Adherence/Education   Identified Date: 5/21/2025 Completed Date: 5/21/2025         Impotence of organic origin   1 Current Medication: sildenafil (VIAGRA) 100 MG cap/tab (Discontinued)   Current Medication Sig: Take 1 tablet (100 mg) by mouth daily as needed for erectile dysfunction   Rationale: Condition refractory to medication - Ineffective medication - Effectiveness   Recommendation: Change Medication - tadalafil 10 MG tablet - as needed   Status: Contact Provider - Awaiting Response   Identified Date: 5/21/2025         Rash   1 Current Medication: doxycycline monohydrate (MONODOX) 100 MG capsule (Discontinued)   Current Medication Sig: Take 1 capsule (100 mg) by mouth 2 times daily.   Rationale: Frequency inappropriate   Recommendation: Decrease Frequency - doxycycline monohydrate 100 MG capsule - 1 cap QHS.   Status: Accepted per Provider   Identified Date: 5/21/2025 Completed Date: 5/21/2025         Type 2 diabetes mellitus with diabetic nephropathy, without long-term current use of insulin (H)   1 Current Medication: aspirin 81 MG tablet (Discontinued)   Current Medication Sig: Take 81 mg by mouth daily   Rationale: No medical indication at this time - Unnecessary medication therapy - Indication   Recommendation: Discontinue Medication - Stop ASA.   Status: Accepted - no CPA Needed   Identified Date: 5/21/2025 Completed Date: 5/21/2025

## 2025-05-19 ENCOUNTER — TELEPHONE (OUTPATIENT)
Dept: DERMATOLOGY | Facility: CLINIC | Age: 85
End: 2025-05-19
Payer: COMMERCIAL

## 2025-05-19 ENCOUNTER — RESULTS FOLLOW-UP (OUTPATIENT)
Dept: DERMATOLOGY | Facility: CLINIC | Age: 85
End: 2025-05-19

## 2025-05-19 NOTE — TELEPHONE ENCOUNTER
Spoke with patient and gave the below results. Patient schedule for a cryo appointment with Dr. Jarvis 5/20/25 at 1 pm. Patient verbalized understanding.    Alicia Negrete MA

## 2025-05-19 NOTE — TELEPHONE ENCOUNTER
Tejinder Jarvis MD to Shira Badillo Rn Pool   Result Note  Lower mucosal lip:  - Lichenoid actinic cheilitis - (see description)              Sun damage of lip     Schedule cryo

## 2025-05-20 ENCOUNTER — OFFICE VISIT (OUTPATIENT)
Dept: DERMATOLOGY | Facility: CLINIC | Age: 85
End: 2025-05-20
Payer: COMMERCIAL

## 2025-05-20 DIAGNOSIS — L57.0 AK (ACTINIC KERATOSIS): Primary | ICD-10-CM

## 2025-05-20 PROCEDURE — 17000 DESTRUCT PREMALG LESION: CPT | Mod: 79 | Performed by: DERMATOLOGY

## 2025-05-20 NOTE — PROGRESS NOTES
Cecilio Navarro is an extremely pleasant 84 year old year old male patient here today for evaluation and managment of actinic keratosis on lower lip.  Patient has no other skin complaints today.  Remainder of the HPI, Meds, PMH, Allergies, FH, and SH was reviewed in chart.      Past Medical History:   Diagnosis Date    Actinic keratoses     Basal cell carcinoma     Squamous cell carcinoma of skin, unspecified     Type II or unspecified type diabetes mellitus without mention of complication, not stated as uncontrolled     Unspecified disorder of male genital organs     Unspecified essential hypertension        Past Surgical History:   Procedure Laterality Date    COLONOSCOPY  10/16/2013    Diverticulosis in sigmoid colon; repeat in 5 years; Surgeon: Garry Ambrose MD;  Location: [facility]    HC REMOVE TONSILS/ADENOIDS,<11 Y/O          Family History   Problem Relation Age of Onset    Hypertension Mother     Cerebrovascular Disease Mother         mentally affected    Cancer - colorectal Father         mets to liver  age76    Gastrointestinal Disease Brother         multilple colon polyps    Hypertension Brother     Gastrointestinal Disease Sister         reflux    Obesity Son     Allergies Daughter     Arthritis Brother     Melanoma No family hx of        Social History     Socioeconomic History    Marital status:      Spouse name: Not on file    Number of children: Not on file    Years of education: Not on file    Highest education level: Not on file   Occupational History    Not on file   Tobacco Use    Smoking status: Never    Smokeless tobacco: Never   Vaping Use    Vaping status: Never Used   Substance and Sexual Activity    Alcohol use: Yes     Comment: occasional social    Drug use: No    Sexual activity: Yes     Partners: Female   Other Topics Concern     Service No    Blood Transfusions No    Caffeine Concern Yes     Comment: 1-2 day    Occupational Exposure No    Hobby Hazards Yes      Comment: [employer]    Sleep Concern No    Stress Concern No    Weight Concern No    Special Diet Yes     Comment: Diabetic and low fat    Back Care No    Exercise Yes    Bike Helmet Not Asked    Seat Belt Yes    Self-Exams Yes     Comment: check blood sugars    Parent/sibling w/ CABG, MI or angioplasty before 65F 55M? Not Asked   Social History Narrative    Not on file     Social Drivers of Health     Financial Resource Strain: Low Risk  (12/27/2023)    Financial Resource Strain     Within the past 12 months, have you or your family members you live with been unable to get utilities (heat, electricity) when it was really needed?: No   Food Insecurity: Low Risk  (12/27/2023)    Food Insecurity     Within the past 12 months, did you worry that your food would run out before you got money to buy more?: No     Within the past 12 months, did the food you bought just not last and you didn t have money to get more?: No   Transportation Needs: Low Risk  (12/27/2023)    Transportation Needs     Within the past 12 months, has lack of transportation kept you from medical appointments, getting your medicines, non-medical meetings or appointments, work, or from getting things that you need?: No   Physical Activity: Not on file   Stress: Not on file   Social Connections: Not on file   Interpersonal Safety: Low Risk  (12/11/2024)    Interpersonal Safety     Do you feel physically and emotionally safe where you currently live?: Yes     Within the past 12 months, have you been hit, slapped, kicked or otherwise physically hurt by someone?: No     Within the past 12 months, have you been humiliated or emotionally abused in other ways by your partner or ex-partner?: No   Housing Stability: Low Risk  (12/27/2023)    Housing Stability     Do you have housing? : Yes     Are you worried about losing your housing?: No       Outpatient Encounter Medications as of 5/20/2025   Medication Sig Dispense Refill    aspirin 81 MG tablet Take 81 mg by  mouth daily      atorvastatin (LIPITOR) 40 MG tablet Take 1 tablet by mouth once daily 90 tablet 3    blood glucose (NO BRAND SPECIFIED) test strip Use to test blood sugar 1 times daily.  Accu-chek guide me strips. 100 strip 3    blood glucose monitoring (SOFTCLIX) lancets Use to test blood sugar 1 times daily 100 each 5    ciclopirox (LOPROX) 0.77 % cream Apply topically at bedtime 90 g 6    doxycycline monohydrate (MONODOX) 100 MG capsule Take 1 capsule (100 mg) by mouth 2 times daily. 60 capsule 6    glipiZIDE (GLUCOTROL XL) 10 MG 24 hr tablet Take 1 tablet (10 mg) by mouth daily. 90 tablet 3    hydrochlorothiazide (HYDRODIURIL) 25 MG tablet Take 1 tablet (25 mg) by mouth daily. 90 tablet 3    lisinopril (ZESTRIL) 40 MG tablet Take 1 tablet (40 mg) by mouth daily. 90 tablet 3    metFORMIN (GLUCOPHAGE-XR) 750 MG 24 hr tablet Take 1 tablet (750 mg) by mouth 2 times daily (with meals). 180 tablet 3    metoprolol succinate ER (TOPROL XL) 50 MG 24 hr tablet Take 1 tablet (50 mg) by mouth daily. 90 tablet 3    mometasone (ELOCON) 0.1 % external cream Apply topically daily To the back 60 g 6    Multiple Vitamins-Minerals (ONE DAILY MENS 50+ MULTIVIT PO) Take 1 tablet by mouth daily.      Semaglutide (RYBELSUS) 14 MG tablet Take 14 mg by mouth daily. 90 tablet 3    sildenafil (VIAGRA) 100 MG cap/tab Take 1 tablet (100 mg) by mouth daily as needed for erectile dysfunction 3 tablet 11    tamsulosin (FLOMAX) 0.4 MG capsule Take 1 capsule (0.4 mg) by mouth daily. 90 capsule 3    vardenafil (LEVITRA) 20 MG tablet Take 1 tablet (20 mg) by mouth daily as needed for erectile dysfunction 5 tablet 11     No facility-administered encounter medications on file as of 5/20/2025.             O:   NAD, WDWN, Alert & Oriented, Mood & Affect wnl, Vitals stable   General appearance normal   Vitals stable   Alert, oriented and in no acute distress        Gritty scaly papule on lower lip       Eyes: Conjunctivae/lids:Normal     ENT: Lips,  mucosa: normal    MSK:Normal    Cardiovascular: peripheral edema none    Pulm: Breathing Normal    Neuro/Psych: Orientation:Alert and Orientedx3 ; Mood/Affect:normal       A/P:  Actinic keratosis   LN2:  Treated with LN2 for 5s for 1-2 cycles. Warned risks of blistering, pain, pigment change, scarring, and incomplete resolution.  Advised patient to return if lesions do not completely resolve.  Wound care sheet given.  It was a pleasure speaking to Cecilio Navarro today.  Previous clinic notes and pertinent laboratory tests were reviewed prior to Cecilio Navarro's visit.  UV precautions reviewed with patient.  Return to clinic 6 months

## 2025-05-20 NOTE — LETTER
5/20/2025      Cecilio Navarro  [address] [facility] [address]      Dear Colleague,    Thank you for referring your patient, Cecilio Navarro, to the [facility]. Please see a copy of my visit note below.    Cecilio Navarro is an extremely pleasant 84 year old year old male patient here today for evaluation and managment of actinic keratosis on lower lip.  Patient has no other skin complaints today.  Remainder of the HPI, Meds, PMH, Allergies, FH, and SH was reviewed in chart.      Past Medical History:   Diagnosis Date     Actinic keratoses      Basal cell carcinoma      Squamous cell carcinoma of skin, unspecified      Type II or unspecified type diabetes mellitus without mention of complication, not stated as uncontrolled      Unspecified disorder of male genital organs      Unspecified essential hypertension        Past Surgical History:   Procedure Laterality Date     COLONOSCOPY  10/16/2013    Diverticulosis in sigmoid colon; repeat in 5 years; Surgeon: Garry Ambrose MD;  Location: [facility]     HC REMOVE TONSILS/ADENOIDS,<11 Y/O          Family History   Problem Relation Age of Onset     Hypertension Mother      Cerebrovascular Disease Mother         mentally affected     Cancer - colorectal Father         mets to liver  age76     Gastrointestinal Disease Brother         multilple colon polyps     Hypertension Brother      Gastrointestinal Disease Sister         reflux     Obesity Son      Allergies Daughter      Arthritis Brother      Melanoma No family hx of        Social History     Socioeconomic History     Marital status:      Spouse name: Not on file     Number of children: Not on file     Years of education: Not on file     Highest education level: Not on file   Occupational History     Not on file   Tobacco Use     Smoking status: Never     Smokeless tobacco: Never   Vaping Use     Vaping status: Never Used   Substance and Sexual Activity     Alcohol use:  Yes     Comment: occasional social     Drug use: No     Sexual activity: Yes     Partners: Female   Other Topics Concern      Service No     Blood Transfusions No     Caffeine Concern Yes     Comment: 1-2 day     Occupational Exposure No     Hobby Hazards Yes     Comment: [employer]     Sleep Concern No     Stress Concern No     Weight Concern No     Special Diet Yes     Comment: Diabetic and low fat     Back Care No     Exercise Yes     Bike Helmet Not Asked     Seat Belt Yes     Self-Exams Yes     Comment: check blood sugars     Parent/sibling w/ CABG, MI or angioplasty before 65F 55M? Not Asked   Social History Narrative     Not on file     Social Drivers of Health     Financial Resource Strain: Low Risk  (12/27/2023)    Financial Resource Strain      Within the past 12 months, have you or your family members you live with been unable to get utilities (heat, electricity) when it was really needed?: No   Food Insecurity: Low Risk  (12/27/2023)    Food Insecurity      Within the past 12 months, did you worry that your food would run out before you got money to buy more?: No      Within the past 12 months, did the food you bought just not last and you didn t have money to get more?: No   Transportation Needs: Low Risk  (12/27/2023)    Transportation Needs      Within the past 12 months, has lack of transportation kept you from medical appointments, getting your medicines, non-medical meetings or appointments, work, or from getting things that you need?: No   Physical Activity: Not on file   Stress: Not on file   Social Connections: Not on file   Interpersonal Safety: Low Risk  (12/11/2024)    Interpersonal Safety      Do you feel physically and emotionally safe where you currently live?: Yes      Within the past 12 months, have you been hit, slapped, kicked or otherwise physically hurt by someone?: No      Within the past 12 months, have you been humiliated or emotionally abused in other ways by your partner or  ex-partner?: No   Housing Stability: Low Risk  (12/27/2023)    Housing Stability      Do you have housing? : Yes      Are you worried about losing your housing?: No       Outpatient Encounter Medications as of 5/20/2025   Medication Sig Dispense Refill     aspirin 81 MG tablet Take 81 mg by mouth daily       atorvastatin (LIPITOR) 40 MG tablet Take 1 tablet by mouth once daily 90 tablet 3     blood glucose (NO BRAND SPECIFIED) test strip Use to test blood sugar 1 times daily.  Accu-chek guide me strips. 100 strip 3     blood glucose monitoring (SOFTCLIX) lancets Use to test blood sugar 1 times daily 100 each 5     ciclopirox (LOPROX) 0.77 % cream Apply topically at bedtime 90 g 6     doxycycline monohydrate (MONODOX) 100 MG capsule Take 1 capsule (100 mg) by mouth 2 times daily. 60 capsule 6     glipiZIDE (GLUCOTROL XL) 10 MG 24 hr tablet Take 1 tablet (10 mg) by mouth daily. 90 tablet 3     hydrochlorothiazide (HYDRODIURIL) 25 MG tablet Take 1 tablet (25 mg) by mouth daily. 90 tablet 3     lisinopril (ZESTRIL) 40 MG tablet Take 1 tablet (40 mg) by mouth daily. 90 tablet 3     metFORMIN (GLUCOPHAGE-XR) 750 MG 24 hr tablet Take 1 tablet (750 mg) by mouth 2 times daily (with meals). 180 tablet 3     metoprolol succinate ER (TOPROL XL) 50 MG 24 hr tablet Take 1 tablet (50 mg) by mouth daily. 90 tablet 3     mometasone (ELOCON) 0.1 % external cream Apply topically daily To the back 60 g 6     Multiple Vitamins-Minerals (ONE DAILY MENS 50+ MULTIVIT PO) Take 1 tablet by mouth daily.       Semaglutide (RYBELSUS) 14 MG tablet Take 14 mg by mouth daily. 90 tablet 3     sildenafil (VIAGRA) 100 MG cap/tab Take 1 tablet (100 mg) by mouth daily as needed for erectile dysfunction 3 tablet 11     tamsulosin (FLOMAX) 0.4 MG capsule Take 1 capsule (0.4 mg) by mouth daily. 90 capsule 3     vardenafil (LEVITRA) 20 MG tablet Take 1 tablet (20 mg) by mouth daily as needed for erectile dysfunction 5 tablet 11     No facility-administered  encounter medications on file as of 5/20/2025.             O:   NAD, WDWN, Alert & Oriented, Mood & Affect wnl, Vitals stable   General appearance normal   Vitals stable   Alert, oriented and in no acute distress        Gritty scaly papule on lower lip       Eyes: Conjunctivae/lids:Normal     ENT: Lips, mucosa: normal    MSK:Normal    Cardiovascular: peripheral edema none    Pulm: Breathing Normal    Neuro/Psych: Orientation:Alert and Orientedx3 ; Mood/Affect:normal       A/P:  Actinic keratosis   LN2:  Treated with LN2 for 5s for 1-2 cycles. Warned risks of blistering, pain, pigment change, scarring, and incomplete resolution.  Advised patient to return if lesions do not completely resolve.  Wound care sheet given.  It was a pleasure speaking to Cecilio Navarro today.  Previous clinic notes and pertinent laboratory tests were reviewed prior to Cecilio Navarro's visit.  UV precautions reviewed with patient.  Return to clinic 6 months      Again, thank you for allowing me to participate in the care of your patient.        Sincerely,        Tejinder Jarvis MD    Electronically signed

## 2025-05-21 ENCOUNTER — TELEPHONE (OUTPATIENT)
Dept: FAMILY MEDICINE | Facility: CLINIC | Age: 85
End: 2025-05-21
Payer: COMMERCIAL

## 2025-05-21 ENCOUNTER — VIRTUAL VISIT (OUTPATIENT)
Dept: PHARMACY | Facility: CLINIC | Age: 85
End: 2025-05-21
Payer: COMMERCIAL

## 2025-05-21 DIAGNOSIS — L73.9 FOLLICULITIS: ICD-10-CM

## 2025-05-21 DIAGNOSIS — Z78.9 TAKES DIETARY SUPPLEMENTS: ICD-10-CM

## 2025-05-21 DIAGNOSIS — L82.1 SEBORRHEIC KERATOSES: ICD-10-CM

## 2025-05-21 DIAGNOSIS — L21.9 DERMATITIS, SEBORRHEIC: ICD-10-CM

## 2025-05-21 DIAGNOSIS — D18.01 ANGIOMA OF SKIN: ICD-10-CM

## 2025-05-21 DIAGNOSIS — E78.5 HYPERLIPIDEMIA LDL GOAL <100: ICD-10-CM

## 2025-05-21 DIAGNOSIS — N52.9 IMPOTENCE OF ORGANIC ORIGIN: Primary | ICD-10-CM

## 2025-05-21 DIAGNOSIS — Z85.828 HISTORY OF SKIN CANCER: ICD-10-CM

## 2025-05-21 DIAGNOSIS — R21 RASH: ICD-10-CM

## 2025-05-21 DIAGNOSIS — L57.0 AK (ACTINIC KERATOSIS): ICD-10-CM

## 2025-05-21 DIAGNOSIS — I10 HTN, GOAL BELOW 140/90: ICD-10-CM

## 2025-05-21 DIAGNOSIS — N13.8 BPH WITH OBSTRUCTION/LOWER URINARY TRACT SYMPTOMS: ICD-10-CM

## 2025-05-21 DIAGNOSIS — L81.4 LENTIGO: ICD-10-CM

## 2025-05-21 DIAGNOSIS — N52.9 IMPOTENCE OF ORGANIC ORIGIN: ICD-10-CM

## 2025-05-21 DIAGNOSIS — D23.9 DERMAL NEVUS: ICD-10-CM

## 2025-05-21 DIAGNOSIS — E11.21 TYPE 2 DIABETES MELLITUS WITH DIABETIC NEPHROPATHY, WITHOUT LONG-TERM CURRENT USE OF INSULIN (H): Primary | ICD-10-CM

## 2025-05-21 DIAGNOSIS — N40.1 BPH WITH OBSTRUCTION/LOWER URINARY TRACT SYMPTOMS: ICD-10-CM

## 2025-05-21 PROCEDURE — 99607 MTMS BY PHARM ADDL 15 MIN: CPT | Mod: 93 | Performed by: PHARMACIST

## 2025-05-21 PROCEDURE — 99605 MTMS BY PHARM NP 15 MIN: CPT | Mod: 93 | Performed by: PHARMACIST

## 2025-05-21 RX ORDER — DOXYCYCLINE 100 MG/1
100 CAPSULE ORAL AT BEDTIME
Qty: 90 CAPSULE | Refills: 2 | Status: SHIPPED | OUTPATIENT
Start: 2025-05-21

## 2025-05-21 NOTE — LETTER
"Recommended To-Do List      Prepared on: May 21, 2025       You can get the best results from your medications by completing the items on this \"To-Do List.\"      Bring your To-Do List when you go to your doctor. And, share it with your family or caregivers.    My To-Do List:  What we talked about: What I should do:   Erectile dysfunction    Soraida will discuss with Dr. Dawn about tadalafil 10mg as needed.          What we talked about: What I should do:    Blood pressure medication timing    Change hydrochlorothiazide to 25mg every morning.          What we talked about: What I should do:    Doxycycline directions  Dr. Jarvis updated prescription for doxycycline 100mg at bedtime.          What we talked about: What I should do:   Aspirin no longer necessary    Stay off baby aspirin as directed by Dr. Morales.          What we talked about: What I should do:                     "

## 2025-05-21 NOTE — LETTER
_  Medication List        Prepared on: May 21, 2025     Bring your Medication List when you go to the doctor, hospital, or   emergency room. And, share it with your family or caregivers.     Note any changes to how you take your medications.  Cross out medications when you no longer use them.    Medication How I take it Why I use it Prescriber   atorvastatin (LIPITOR) 40 MG tablet Take 1 tablet (40 mg) by mouth at bedtime. Cholesterol Damion Morales MD   blood glucose (NO BRAND SPECIFIED) test strip Use to test blood sugar 1 time daily or as directed. Diabetes Damion Morlaes MD   blood glucose monitoring (SOFTCLIX) lancets Use to test blood sugar 1 time daily or as directed. Diabetes Damion Morales MD   doxycycline monohydrate (MONODOX) 100 MG capsule Take 1 capsule (100 mg) by mouth at bedtime. Skin Tejinder Jarvis MD   glipiZIDE (GLUCOTROL XL) 10 MG 24 hr tablet Take 1 tablet (10 mg) by mouth every morning (with breakfast). Diabetes Damion Morales MD   hydrochlorothiazide (HYDRODIURIL) 25 MG tablet Take 1 tablet (25 mg) by mouth every morning. Blood pressure Damion Morales MD   lisinopril (ZESTRIL) 40 MG tablet Take 1 tablet (40 mg) by mouth daily. Blood pressure Damion Morales MD   metFORMIN (GLUCOPHAGE-XR) 750 MG 24 hr tablet Take 1 tablet (750 mg) by mouth 2 times daily (with meals). Diabetes Damion Morales MD   metoprolol succinate ER (TOPROL XL) 50 MG 24 hr tablet Take 1 tablet (50 mg) by mouth daily. Blood pressure Damion Morales MD   mometasone (ELOCON) 0.1 % external cream Apply topically daily to the back as needed. Skin Tejinder Jarvis MD   Multiple Vitamins-Minerals (ONE DAILY MENS 50+ MULTIVIT PO) Take 1 tablet by mouth daily. General health Damion Morales MD   Semaglutide (RYBELSUS) 14 MG tablet Take 1 tablet (14 mg) by mouth every morning. Diabetes Damion Morales MD   tamsulosin (FLOMAX) 0.4 MG capsule Take 1 capsule (0.4 mg) by mouth daily. Prostate Damion Morales MD         Add new  medications, over-the-counter drugs, herbals, vitamins, or  minerals in the blank rows below.    Medication How I take it Why I use it Prescriber                                      Allergies:      - Amoxicillin - Itching, Rash        Side effects I have had:      Not on File        Other Information:              My notes and questions:

## 2025-05-21 NOTE — LETTER
May 26, 2025  Cecilio Navarro  [address] [facility] [address]    Dear Mr. Navarro, [facility]     Thank you for talking with me on May 21, 2025 about your health and medications. As a follow-up to our conversation, I have included two documents:      Your Recommended To-Do List has steps you should take to get the best results from your medications.  Your Medication List will help you keep track of your medications and how to take them.    If you want to talk about these documents, please call Soraida Liu RPH at phone: [phone], Monday-Friday 8-4:30pm.    I look forward to working with you and your doctors to make sure your medications work well for you.    Sincerely,  Soraida Liu RPH  [facility] Pharmacist, [facility]

## 2025-05-21 NOTE — TELEPHONE ENCOUNTER
Hi Dr. Simona Lopez I met with patient for MTM and he takes doxycycline 100mg at bedtime (not 100mg twice daily). Sounds like it's working well at the lower dose. Can you please update the prescription?     Soraida Liu, PharmD, [facility]  Medication Therapy Management Pharmacist

## 2025-05-26 NOTE — PATIENT INSTRUCTIONS
"Recommendations from today's MTM visit:                                                    MTM (medication therapy management) is a service provided by a clinical pharmacist designed to help you get the most of out of your medicines.   Today we reviewed what your medicines are for, how to know if they are working, that your medicines are safe and how to make your medicine regimen as easy as possible.      Change hydrochlorothiazide to 25mg every morning. Skip dose tonight and take tomorrow morning.  Dr. Jarvis updated prescription for doxycycline 100mg at bedtime.  Soraida to discuss with Dr. Morales about tadalafil 10mg as needed for erectile dysfunction.  Stay off baby aspirin as directed by Dr. Morales.    Follow-up: Return in 22 days (on 6/12/2025) for Primary Care Provider Visit, Diabetes Recheck.    It was great speaking with you today.  I value your experience and would be very thankful for your time in providing feedback in our clinic survey. In the next few days, you may receive an email or text message from [employer] with a link to a survey related to your  clinical pharmacist.\"     To schedule another MTM appointment, please call the clinic directly or you may call the MTM scheduling line at [phone] or toll-free at [phone].     My Clinical Pharmacist's contact information:                                                      Please feel free to contact me with any questions or concerns you have.      Soraida Liu, PharmD, [facility]  Medication Therapy Management Pharmacist  Voicemail: [phone] (Mon/Wed only)     "

## 2025-05-28 RX ORDER — TADALAFIL 10 MG/1
10 TABLET ORAL DAILY PRN
Qty: 10 TABLET | Refills: 1 | Status: SHIPPED | OUTPATIENT
Start: 2025-05-28

## 2025-06-12 ENCOUNTER — OFFICE VISIT (OUTPATIENT)
Dept: FAMILY MEDICINE | Facility: CLINIC | Age: 85
End: 2025-06-12
Payer: COMMERCIAL

## 2025-06-12 ENCOUNTER — RESULTS FOLLOW-UP (OUTPATIENT)
Dept: FAMILY MEDICINE | Facility: CLINIC | Age: 85
End: 2025-06-12

## 2025-06-12 VITALS
SYSTOLIC BLOOD PRESSURE: 138 MMHG | BODY MASS INDEX: 29.41 KG/M2 | DIASTOLIC BLOOD PRESSURE: 78 MMHG | WEIGHT: 183 LBS | TEMPERATURE: 97.3 F | HEART RATE: 60 BPM | OXYGEN SATURATION: 100 % | HEIGHT: 66 IN | RESPIRATION RATE: 16 BRPM

## 2025-06-12 DIAGNOSIS — I10 HTN, GOAL BELOW 140/90: ICD-10-CM

## 2025-06-12 DIAGNOSIS — E78.5 HYPERLIPIDEMIA LDL GOAL <100: ICD-10-CM

## 2025-06-12 DIAGNOSIS — E11.21 TYPE 2 DIABETES MELLITUS WITH DIABETIC NEPHROPATHY, WITHOUT LONG-TERM CURRENT USE OF INSULIN (H): ICD-10-CM

## 2025-06-12 DIAGNOSIS — Z00.00 ENCOUNTER FOR MEDICARE ANNUAL WELLNESS EXAM: Primary | ICD-10-CM

## 2025-06-12 DIAGNOSIS — N13.8 BPH WITH OBSTRUCTION/LOWER URINARY TRACT SYMPTOMS: ICD-10-CM

## 2025-06-12 DIAGNOSIS — N40.1 BPH WITH OBSTRUCTION/LOWER URINARY TRACT SYMPTOMS: ICD-10-CM

## 2025-06-12 LAB
EST. AVERAGE GLUCOSE BLD GHB EST-MCNC: 131 MG/DL
HBA1C MFR BLD: 6.2 % (ref 0–5.6)

## 2025-06-12 SDOH — HEALTH STABILITY: PHYSICAL HEALTH: ON AVERAGE, HOW MANY DAYS PER WEEK DO YOU ENGAGE IN MODERATE TO STRENUOUS EXERCISE (LIKE A BRISK WALK)?: 7 DAYS

## 2025-06-12 ASSESSMENT — PAIN SCALES - GENERAL: PAINLEVEL_OUTOF10: NO PAIN (0)

## 2025-06-12 ASSESSMENT — SOCIAL DETERMINANTS OF HEALTH (SDOH): HOW OFTEN DO YOU GET TOGETHER WITH FRIENDS OR RELATIVES?: MORE THAN THREE TIMES A WEEK

## 2025-06-12 NOTE — PATIENT INSTRUCTIONS
Patient Education   Preventive Care Advice   This is general advice given by our system to help you stay healthy. However, your care team may have specific advice just for you. Please talk to your care team about your preventive care needs.  Nutrition  Eat 5 or more servings of fruits and vegetables each day.  Try wheat bread, brown rice and whole grain pasta (instead of white bread, rice, and pasta).  Get enough calcium and vitamin D. Check the label on foods and aim for 100% of the RDA (recommended daily allowance).  Lifestyle  Exercise at least 150 minutes each week  (30 minutes a day, 5 days a week).  Do muscle strengthening activities 2 days a week. These help control your weight and prevent disease.  No smoking.  Wear sunscreen to prevent skin cancer.  Have a dental exam and cleaning every 6 months.  Yearly exams  See your health care team every year to talk about:  Any changes in your health.  Any medicines your care team has prescribed.  Preventive care, family planning, and ways to prevent chronic diseases.  Shots (vaccines)   HPV shots (up to age 26), if you've never had them before.  Hepatitis B shots (up to age 59), if you've never had them before.  COVID-19 shot: Get this shot when it's due.  Flu shot: Get a flu shot every year.  Tetanus shot: Get a tetanus shot every 10 years.  Pneumococcal, hepatitis A, and RSV shots: Ask your care team if you need these based on your risk.  Shingles shot (for age 50 and up)  General health tests  Diabetes screening:  Starting at age 35, Get screened for diabetes at least every 3 years.  If you are younger than age 35, ask your care team if you should be screened for diabetes.  Cholesterol test: At age 39, start having a cholesterol test every 5 years, or more often if advised.  Bone density scan (DEXA): At age 50, ask your care team if you should have this scan for osteoporosis (brittle bones).  Hepatitis C: Get tested at least once in your life.  STIs (sexually  transmitted infections)  Before age 24: Ask your care team if you should be screened for STIs.  After age 24: Get screened for STIs if you're at risk. You are at risk for STIs (including HIV) if:  You are sexually active with more than one person.  You don't use condoms every time.  You or a partner was diagnosed with a sexually transmitted infection.  If you are at risk for HIV, ask about PrEP medicine to prevent HIV.  Get tested for HIV at least once in your life, whether you are at risk for HIV or not.  Cancer screening tests  Cervical cancer screening: If you have a cervix, begin getting regular cervical cancer screening tests starting at age 21.  Breast cancer scan (mammogram): If you've ever had breasts, begin having regular mammograms starting at age 40. This is a scan to check for breast cancer.  Colon cancer screening: It is important to start screening for colon cancer at age 45.  Have a colonoscopy test every 10 years (or more often if you're at risk) Or, ask your provider about stool tests like a FIT test every year or Cologuard test every 3 years.  To learn more about your testing options, visit:   .  For help making a decision, visit:   https://bit.ly/ky61384.  Prostate cancer screening test: If you have a prostate, ask your care team if a prostate cancer screening test (PSA) at age 55 is right for you.  Lung cancer screening: If you are a current or former smoker ages 50 to 80, ask your care team if ongoing lung cancer screenings are right for you.  For informational purposes only. Not to replace the advice of your health care provider. Copyright   2023 [facility] [employer]. All rights reserved. Clinically reviewed by the [facility]. [employer] 357092 - REV 01/24.  Hearing Loss: Care Instructions  Overview     Hearing loss is a sudden or slow decrease in how well you hear. It can range from slight to profound. Permanent hearing loss can occur with aging. It also can  happen when you are exposed long-term to loud noise. Examples include listening to loud music, riding motorcycles, or being around other loud machines.  Hearing loss can affect your work and home life. It can make you feel lonely or depressed. You may feel that you have lost your independence. But hearing aids and other devices can help you hear better and feel connected to others.  Follow-up care is a key part of your treatment and safety. Be sure to make and go to all appointments, and call your doctor if you are having problems. It's also a good idea to know your test results and keep a list of the medicines you take.  How can you care for yourself at home?  Avoid loud noises whenever possible. This helps keep your hearing from getting worse.  Always wear hearing protection around loud noises.  Wear a hearing aid as directed.  A professional can help you pick a hearing aid that will work best for you.  You can also get hearing aids over the counter for mild to moderate hearing loss.  Have hearing tests as your doctor suggests. They can show whether your hearing has changed. Your hearing aid may need to be adjusted.  Use other devices as needed. These may include:  Telephone amplifiers and hearing aids that can connect to a television, stereo, radio, or microphone.  Devices that use lights or vibrations. These alert you to the doorbell, a ringing telephone, or a baby monitor.  Television closed-captioning. This shows the words at the bottom of the screen. Most new TVs can do this.  TTY (text telephone). This lets you type messages back and forth on the telephone instead of talking or listening. These devices are also called TDD. When messages are typed on the keyboard, they are sent over the phone line to a receiving TTY. The message is shown on a monitor.  Use text messaging, social media, and email if it is hard for you to communicate by telephone.  Try to learn a listening technique called speechreading. It is  "not lipreading. You pay attention to people's gestures, expressions, posture, and tone of voice. These clues can help you understand what a person is saying. Face the person you are talking to, and have them face you. Make sure the lighting is good. You need to see the other person's face clearly.  Think about counseling if you need help to adjust to your hearing loss.  When should you call for help?  Watch closely for changes in your health, and be sure to contact your doctor if:    You think your hearing is getting worse.     You have new symptoms, such as dizziness or nausea.   Where can you learn more?  Go to https://www.[employer].net/patiented  Enter R798 in the search box to learn more about \"Hearing Loss: Care Instructions.\"  Current as of: October 27, 2024  Content Version: 14.5    3348-5329 [employer].   Care instructions adapted under license by your healthcare professional. If you have questions about a medical condition or this instruction, always ask your healthcare professional. [employer] disclaims any warranty or liability for your use of this information.       "

## 2025-06-12 NOTE — PROGRESS NOTES
"Preventive Care Visit  [facility]  Damion Morales MD, Family Medicine  Jun 12, 2025      Assessment & Plan   Wellness visit  DM2, recheck  Htn,stable  Hyperlipidemia: stable  Bph: stable     Continue medications for medical issues as above in med list and orders   Labs ordered as appropriate for monitoring the listed medical conditions.      Back in 6 mo          BMI  Estimated body mass index is 29.54 kg/m  as calculated from the following:    Height as of this encounter: 1.676 m (5' 6\").    Weight as of this encounter: 83 kg (183 lb).       Counseling  Appropriate preventive services were addressed with this patient via screening, questionnaire, or discussion as appropriate for fall prevention, nutrition, physical activity, Tobacco-use cessation, social engagement, weight loss and cognition.  Checklist reviewing preventive services available has been given to the patient.  Reviewed patient's diet, addressing concerns and/or questions.   The patient was provided with written information regarding signs of hearing loss.         Chelsey Lainez is a 84 year old, presenting for the following:  Physical, Diabetes, Hypertension, Lipids, and Eye Exam (Diabetic eye exam scheduled next week Tuesday)  DM2, some nephropathy,  recheck, med controlled, needs A1C updated, has been good.   BPH: stable on flomax.    Htn,stable, med controlled, tolerating fine.    Hyperlipidemia: stable on statin, t olerating fine       6/12/2025    10:11 AM   Additional Questions   Roomed by Echo   Accompanied by Shanice, wife         6/12/2025   Forms   Any forms needing to be completed Yes        Healthy Habits:     Taking medications regularly:  0  History of Present Illness       Diabetes:   He presents for follow up of diabetes.  He is checking home blood glucose a few times a month.   He checks blood glucose before meals.  Blood glucose is never over 200 and never under 70.  When his blood glucose is low, the " patient is asymptomatic for confusion, blurred vision, lethargy and reports not feeling dizzy, shaky, or weak.   He has no concerns regarding his diabetes at this time.   He is not experiencing numbness or burning in feet, excessive thirst, blurry vision, weight changes or redness, sores or blisters on feet. The patient has not had a diabetic eye exam in the last 12 months.          He eats 2-3 servings of fruits and vegetables daily.He consumes 1 sweetened beverage(s) daily.He exercises with enough effort to increase his heart rate 30 to 60 minutes per day.  He exercises with enough effort to increase his heart rate 7 days per week.   He is taking medications regularly.          BP Readings from Last 2 Encounters:   06/12/25 138/78   05/06/25 112/72     Hemoglobin A1C (%)   Date Value   12/11/2024 7.5 (H)   12/27/2023 6.9 (H)   05/10/2021 7.0 (H)   12/30/2020 10.2 (H)     LDL Cholesterol Calculated (mg/dL)   Date Value   12/11/2024 56   12/27/2023 56   09/16/2020 35   04/24/2018 50       Advance Care Planning    Discussed advance care planning with patient; informed AVS has link to [employer] [employer].        6/12/2025   General Health   How would you rate your overall physical health? Excellent   Feel stress (tense, anxious, or unable to sleep) Not at all         6/12/2025   Nutrition   Diet: Diabetic         6/12/2025   Exercise   Days per week of moderate/strenous exercise 7 days         6/12/2025   Social Factors   Frequency of gathering with friends or relatives More than three times a week   Worry food won't last until get money to buy more No   Food not last or not have enough money for food? No   Do you have housing? (Housing is defined as stable permanent housing and does not include staying outside in a car, in a tent, in an abandoned building, in an overnight shelter, or couch-surfing.) Yes   Are you worried about losing your housing? No   Lack of transportation? No   Unable to get utilities  (heat,electricity)? No         6/12/2025   Fall Risk   Fallen 2 or more times in the past year? No   Trouble with walking or balance? No          6/12/2025   Activities of Daily Living- Home Safety   Needs help with the following daily activites None of the above   Safety concerns in the home None of the above         6/12/2025   Dental   Dentist two times every year? Yes         6/12/2025   Hearing Screening   Hearing concerns? (!) I NEED TO ASK PEOPLE TO SPEAK UP OR REPEAT THEMSELVES.    (!) IT'S HARD TO FOLLOW A CONVERSATION IN A NOISY RESTAURANT OR CROWDED ROOM.    (!) TROUBLE FOLLOWING DIALOGUE IN THE THEATHER.    (!) TROUBLE UNDERSTANDING SOFT OR WHISPERED SPEECH.       Multiple values from one day are sorted in reverse-chronological order         6/12/2025   Driving Risk Screening   Patient/family members have concerns about driving No         6/12/2025   General Alertness/Fatigue Screening   Have you been more tired than usual lately? No         6/12/2025   Urinary Incontinence Screening   Bothered by leaking urine in past 6 months No         Today's PHQ-2 Score:       6/12/2025    10:02 AM   PHQ-2 ( 1999 [employer])   Q1: Little interest or pleasure in doing things 0   Q2: Feeling down, depressed or hopeless 0   PHQ-2 Score 0    Q1: Little interest or pleasure in doing things Not at all   Q2: Feeling down, depressed or hopeless Not at all   PHQ-2 Score 0       Patient-reported           6/12/2025   Substance Use   Alcohol more than 3/day or more than 7/wk No   Do you have a current opioid prescription? No   How severe/bad is pain from 1 to 10? 0/10 (No Pain)   Do you use any other substances recreationally? No     Social History     Tobacco Use    Smoking status: Never     Passive exposure: Never    Smokeless tobacco: Never   Vaping Use    Vaping status: Never Used   Substance Use Topics    Alcohol use: Yes     Comment: occasional social    Drug use: No               Reviewed and updated as needed this visit by  "Provider   Tobacco  Allergies  Meds  Problems  Med Hx  Surg Hx  Fam Hx     Sexual Activity            Current providers sharing in care for this patient include:  Patient Care Team:  Damion Morales MD as PCP - General  Damion Morales MD as Assigned PCP  Bertha Norwood RD as Diabetes Educator (Dietitian, Registered)  Zaire Ayers AuD as Audiologist (Audiology)  Tejinder Jarvis MD as Assigned Dermatology Provider  Soraida Liu [facility] as Pharmacist (Pharmacist)    The following health maintenance items are reviewed in Epic and correct as of today:  Health Maintenance   Topic Date Due    ANNUAL REVIEW OF HM ORDERS  Never done    ZOSTER VACCINE (1 of 2) Never done    DIABETIC FOOT EXAM  12/16/2014    RSV VACCINE (1 - 1-dose 75+ series) Never done    EYE EXAM  05/31/2025    A1C  06/11/2025    BMP  12/11/2025    LIPID  12/11/2025    MICROALBUMIN  12/11/2025    MEDICARE ANNUAL WELLNESS VISIT  06/12/2026    FALL RISK ASSESSMENT  06/12/2026    ADVANCE CARE PLANNING  06/12/2030    DTAP/TDAP/TD VACCINE (4 - Td or Tdap) 10/01/2031    PHQ-2 (once per calendar year)  Completed    INFLUENZA VACCINE  Completed    PNEUMOCOCCAL VACCINE 50+ YEARS  Completed    COVID-19 VACCINE  Completed    Medicare Annual MTM Pharmacist Visit (once per calendar year)  Completed    HPV VACCINE  Aged Out    MENINGITIS VACCINE  Aged Out    TSH W/FREE T4 REFLEX  Discontinued         Review of Systems  Constitutional, HEENT, cardiovascular, pulmonary, gi and gu systems are negative, except as otherwise noted.     Objective    Exam  /78   Pulse 60   Temp 97.3  F (36.3  C) (Tympanic)   Resp 16   Ht 1.676 m (5' 6\")   Wt 83 kg (183 lb)   SpO2 100%   BMI 29.54 kg/m     Estimated body mass index is 29.54 kg/m  as calculated from the following:    Height as of this encounter: 1.676 m (5' 6\").    Weight as of this encounter: 83 kg (183 lb).    Physical Exam  Gen: alert and oriented, in no acute distress, affect within " normal limits  CV: RRR, no murmur  RESP: lungs clear bilaterally, good effort  EXT: no edema or lesions noted in lower extremities  Some numbness on monofilament L foot.  No lesions           6/12/2025   Mini Cog   Clock Draw Score 2 Normal   3 Item Recall 1 object recalled   Mini Cog Total Score 3            Signed Electronically by: Damion Morales MD

## 2025-06-24 ENCOUNTER — TELEPHONE (OUTPATIENT)
Dept: FAMILY MEDICINE | Facility: CLINIC | Age: 85
End: 2025-06-24
Payer: COMMERCIAL

## 2025-06-24 NOTE — TELEPHONE ENCOUNTER
A refill request has been made to the [employer] assistance program for Rybels[employer].     This should arrive at Cecilio's home within 7-10 business days.       Natasha Wesley   PAP    Pool:[facility]

## 2025-06-26 ENCOUNTER — VIRTUAL VISIT (OUTPATIENT)
Dept: EDUCATION SERVICES | Facility: CLINIC | Age: 85
End: 2025-06-26
Payer: COMMERCIAL

## 2025-06-26 DIAGNOSIS — E11.21 TYPE 2 DIABETES MELLITUS WITH DIABETIC NEPHROPATHY, WITHOUT LONG-TERM CURRENT USE OF INSULIN (H): Primary | ICD-10-CM

## 2025-06-26 NOTE — PROGRESS NOTES
Diabetes Self-Management Education & Support    Presents for: Individual review    Type of Service: Telephone Visit    Originating Location (Patient Location): Home  Distant Location (Provider Location): [facility]  Mode of Communication:  Telephone    Telephone Visit Start Time: 1:03 pm  Telephone Visit End Time (telephone visit stop time): 1:58 pm    How would patient like to obtain AVS? MyChart      Assessment  -now taking his metformin daily and no issues with bowels and blood sugars much better, A1c 6.2%  -wt is down a bit more as well 183#  He is doing very well   Eye exam done    Patient's most recent   Lab Results   Component Value Date    A1C 6.2 06/12/2025    A1C 7.0 05/10/2021     is meeting goal of <7.0    Diabetes knowledge and skills assessment:   Patient is knowledgeable in diabetes management concepts related to: Healthy Eating, Being Active, Monitoring, and Taking Medication    Based on learning assessment above, most appropriate setting for further diabetes education would be: Individual setting.    Care Plan and Education Provided:  Healthy Eating: Portion control  Being Active: Finding a physical activity routine that works for you    Patient verbalized understanding of diabetes self-management education concepts discussed, opportunities for ongoing education and support, and recommendations provided today.    Plan  Doing great, no concerns.        See Care Plan for co-developed, patient-state behavior change goals.    Education Materials Provided:  No new materials provided today      Subjective/Objective  Migel is an 84 year old, presenting for the following diabetes education related to: Individual review  Accompanied by: Self  Diabetes type: Type 2  Cultural Influences/Ethnic Background:  Not  or       Diabetes Symptoms & Complications:          Patient Problem List and Family Medical History reviewed for relevant medical history, current medical status,  "and diabetes risk factors.    Vitals:  There were no vitals taken for this visit.  Estimated body mass index is 29.54 kg/m  as calculated from the following:    Height as of 6/12/25: 1.676 m (5' 6\").    Weight as of 6/12/25: 83 kg (183 lb).   Last 3 BP:   BP Readings from Last 3 Encounters:   06/12/25 138/78   05/06/25 112/72   12/11/24 136/76       History   Smoking Status    Never   Smokeless Tobacco    Never       Labs:  Lab Results   Component Value Date    A1C 6.2 06/12/2025    A1C 7.0 05/10/2021     Lab Results   Component Value Date     12/11/2024     09/22/2021     11/07/2019     Lab Results   Component Value Date    LDL 56 12/11/2024    LDL 35 09/16/2020     HDL Cholesterol   Date Value Ref Range Status   09/16/2020 43 >39 mg/dL Final     Direct Measure HDL   Date Value Ref Range Status   12/11/2024 48 >=40 mg/dL Final     GFR Estimate   Date Value Ref Range Status   12/11/2024 80 >60 mL/min/1.73m2 Final     Comment:     eGFR calculated using 2021 CKD-EPI equation.   11/07/2019 82 >60 mL/min/[1.73_m2] Final     Comment:     Non  GFR Calc  Starting 12/18/2018, serum creatinine based estimated GFR (eGFR) will be   calculated using the Chronic Kidney Disease Epidemiology Collaboration   (CKD-EPI) equation.       GFR Estimate If Black   Date Value Ref Range Status   11/07/2019 >90 >60 mL/min/[1.73_m2] Final     Comment:      GFR Calc  Starting 12/18/2018, serum creatinine based estimated GFR (eGFR) will be   calculated using the Chronic Kidney Disease Epidemiology Collaboration   (CKD-EPI) equation.       Lab Results   Component Value Date    CR 0.95 12/11/2024    CR 0.90 11/07/2019     Lab Results   Component Value Date    MICROL 13.6 12/11/2024    UMALCR 11.80 12/11/2024    UCRR 115.3 12/11/2024 6/26/2025   Healthy Eating   Healthy Eating Assessed Today Yes   Breakfast rey makenna you, eggs and biscuit   Lunch rare, occasionally sandwich or " occasionally the 5 dollar deal at [employer]   Dinner hot dogs, fried potatoes, cauliflower and broccoli, yogurt   Snacks cherries   Other B-117 mg/dL.  Last a1c 6.2%  wt down to 183#  Has been busy working.  Got his eye exam done.   Beverages Water         2025   Being Active   Being Active Assessed Today --        very active, walking dog, mowing lawns 3 plus his own, mows hunting land         2024   Monitoring   Blood Glucose Meter Reli-On   Times checking blood sugar at home (number) Other (see Comments)   Please elaborate: once a week   Times checking blood sugar at home (per) Week           Time Spent: 55 minutes  Encounter Type: Individual    Any diabetes medication dose changes were made via the CDCES Standing Orders under the patient's referring provider.

## 2025-08-27 ENCOUNTER — LAB (OUTPATIENT)
Dept: LAB | Facility: CLINIC | Age: 85
End: 2025-08-27
Payer: COMMERCIAL

## 2025-08-27 ENCOUNTER — TELEPHONE (OUTPATIENT)
Dept: FAMILY MEDICINE | Facility: CLINIC | Age: 85
End: 2025-08-27

## 2025-08-27 ENCOUNTER — VIRTUAL VISIT (OUTPATIENT)
Dept: FAMILY MEDICINE | Facility: CLINIC | Age: 85
End: 2025-08-27
Payer: COMMERCIAL

## 2025-08-27 DIAGNOSIS — I10 HTN, GOAL BELOW 140/90: ICD-10-CM

## 2025-08-27 DIAGNOSIS — R63.0 DECREASED APPETITE: ICD-10-CM

## 2025-08-27 DIAGNOSIS — E11.21 TYPE 2 DIABETES MELLITUS WITH DIABETIC NEPHROPATHY, WITHOUT LONG-TERM CURRENT USE OF INSULIN (H): ICD-10-CM

## 2025-08-27 DIAGNOSIS — R11.0 NAUSEA: Primary | ICD-10-CM

## 2025-08-27 DIAGNOSIS — R11.0 NAUSEA: ICD-10-CM

## 2025-08-27 LAB
ALBUMIN SERPL BCG-MCNC: 4.3 G/DL (ref 3.5–5.2)
ALP SERPL-CCNC: 79 U/L (ref 40–150)
ALT SERPL W P-5'-P-CCNC: 31 U/L (ref 0–70)
AMYLASE SERPL-CCNC: 78 U/L (ref 28–100)
ANION GAP SERPL CALCULATED.3IONS-SCNC: 15 MMOL/L (ref 7–15)
AST SERPL W P-5'-P-CCNC: 34 U/L (ref 0–45)
BILIRUB SERPL-MCNC: 0.8 MG/DL
BUN SERPL-MCNC: 12.9 MG/DL (ref 8–23)
CALCIUM SERPL-MCNC: 10 MG/DL (ref 8.8–10.4)
CHLORIDE SERPL-SCNC: 93 MMOL/L (ref 98–107)
CREAT SERPL-MCNC: 0.89 MG/DL (ref 0.67–1.17)
EGFRCR SERPLBLD CKD-EPI 2021: 85 ML/MIN/1.73M2
ERYTHROCYTE [DISTWIDTH] IN BLOOD BY AUTOMATED COUNT: 12.3 % (ref 10–15)
GLUCOSE SERPL-MCNC: 187 MG/DL (ref 70–99)
HCO3 SERPL-SCNC: 31 MMOL/L (ref 22–29)
HCT VFR BLD AUTO: 40.7 % (ref 40–53)
HGB BLD-MCNC: 13.9 G/DL (ref 13.3–17.7)
LIPASE SERPL-CCNC: 32 U/L (ref 13–60)
MCH RBC QN AUTO: 32 PG (ref 26.5–33)
MCHC RBC AUTO-ENTMCNC: 34.2 G/DL (ref 31.5–36.5)
MCV RBC AUTO: 93.6 FL (ref 78–100)
PLATELET # BLD AUTO: 229 10E3/UL (ref 150–450)
POTASSIUM SERPL-SCNC: 4 MMOL/L (ref 3.4–5.3)
PROT SERPL-MCNC: 7.2 G/DL (ref 6.4–8.3)
RBC # BLD AUTO: 4.35 10E6/UL (ref 4.4–5.9)
SODIUM SERPL-SCNC: 139 MMOL/L (ref 135–145)
WBC # BLD AUTO: 7.4 10E3/UL (ref 4–11)

## 2025-08-27 PROCEDURE — 87635 SARS-COV-2 COVID-19 AMP PRB: CPT

## 2025-08-27 PROCEDURE — 83690 ASSAY OF LIPASE: CPT

## 2025-08-27 PROCEDURE — 36415 COLL VENOUS BLD VENIPUNCTURE: CPT

## 2025-08-27 PROCEDURE — 82150 ASSAY OF AMYLASE: CPT

## 2025-08-27 PROCEDURE — 98006 SYNCH AUDIO-VIDEO EST MOD 30: CPT | Performed by: STUDENT IN AN ORGANIZED HEALTH CARE EDUCATION/TRAINING PROGRAM

## 2025-08-27 PROCEDURE — 80053 COMPREHEN METABOLIC PANEL: CPT

## 2025-08-27 PROCEDURE — 85027 COMPLETE CBC AUTOMATED: CPT

## 2025-08-27 RX ORDER — ONDANSETRON 4 MG/1
4 TABLET, ORALLY DISINTEGRATING ORAL EVERY 8 HOURS PRN
Qty: 15 TABLET | Refills: 0 | Status: SHIPPED | OUTPATIENT
Start: 2025-08-27

## 2025-08-28 LAB — SARS-COV-2 RNA RESP QL NAA+PROBE: NEGATIVE
